# Patient Record
Sex: FEMALE | Race: WHITE | NOT HISPANIC OR LATINO | Employment: FULL TIME | URBAN - METROPOLITAN AREA
[De-identification: names, ages, dates, MRNs, and addresses within clinical notes are randomized per-mention and may not be internally consistent; named-entity substitution may affect disease eponyms.]

---

## 2017-05-05 ENCOUNTER — TRANSCRIBE ORDERS (OUTPATIENT)
Dept: ADMINISTRATIVE | Facility: HOSPITAL | Age: 62
End: 2017-05-05

## 2017-05-05 DIAGNOSIS — R94.5 NONSPECIFIC ABNORMAL RESULTS OF LIVER FUNCTION STUDY: Primary | ICD-10-CM

## 2017-05-16 ENCOUNTER — HOSPITAL ENCOUNTER (OUTPATIENT)
Dept: ULTRASOUND IMAGING | Facility: HOSPITAL | Age: 62
Discharge: HOME | End: 2017-05-16
Attending: INTERNAL MEDICINE
Payer: COMMERCIAL

## 2017-05-16 DIAGNOSIS — R94.5 NONSPECIFIC ABNORMAL RESULTS OF LIVER FUNCTION STUDY: ICD-10-CM

## 2017-05-16 PROCEDURE — 76700 US EXAM ABDOM COMPLETE: CPT

## 2017-05-23 ENCOUNTER — TRANSCRIBE ORDERS (OUTPATIENT)
Dept: ADMINISTRATIVE | Facility: HOSPITAL | Age: 62
End: 2017-05-23

## 2017-05-23 DIAGNOSIS — R94.5 NONSPECIFIC ABNORMAL RESULTS OF LIVER FUNCTION STUDY: Primary | ICD-10-CM

## 2017-05-23 DIAGNOSIS — R94.7 NONSPECIFIC ABNORMAL RESULTS OF OTHER ENDOCRINE FUNCTION STUDY: ICD-10-CM

## 2017-06-16 ENCOUNTER — HOSPITAL ENCOUNTER (OUTPATIENT)
Dept: CT IMAGING | Facility: HOSPITAL | Age: 62
Discharge: HOME | End: 2017-06-16
Attending: INTERNAL MEDICINE
Payer: COMMERCIAL

## 2017-06-16 DIAGNOSIS — R94.5 NONSPECIFIC ABNORMAL RESULTS OF LIVER FUNCTION STUDY: ICD-10-CM

## 2017-06-16 DIAGNOSIS — R94.7 NONSPECIFIC ABNORMAL RESULTS OF OTHER ENDOCRINE FUNCTION STUDY: ICD-10-CM

## 2017-06-16 PROCEDURE — 74160 CT ABDOMEN W/CONTRAST: CPT

## 2017-06-16 RX ADMIN — IOHEXOL 100 ML: 350 INJECTION, SOLUTION INTRAVENOUS at 08:46

## 2018-01-29 ENCOUNTER — TRANSCRIBE ORDERS (OUTPATIENT)
Dept: ADMINISTRATIVE | Facility: HOSPITAL | Age: 63
End: 2018-01-29

## 2018-01-29 ENCOUNTER — HOSPITAL ENCOUNTER (OUTPATIENT)
Dept: NON INVASIVE DIAGNOSTICS | Facility: HOSPITAL | Age: 63
Discharge: HOME | End: 2018-01-29
Attending: FAMILY MEDICINE
Payer: COMMERCIAL

## 2018-01-29 DIAGNOSIS — M79.661 BILATERAL CALF PAIN: ICD-10-CM

## 2018-01-29 DIAGNOSIS — M79.662 BILATERAL CALF PAIN: ICD-10-CM

## 2018-01-29 DIAGNOSIS — M79.661 BILATERAL CALF PAIN: Primary | ICD-10-CM

## 2018-01-29 DIAGNOSIS — M79.662 BILATERAL CALF PAIN: Primary | ICD-10-CM

## 2018-01-29 PROCEDURE — 93971 EXTREMITY STUDY: CPT

## 2018-04-17 ENCOUNTER — TRANSCRIBE ORDERS (OUTPATIENT)
Dept: ADMINISTRATIVE | Facility: HOSPITAL | Age: 63
End: 2018-04-17

## 2018-04-17 ENCOUNTER — APPOINTMENT (OUTPATIENT)
Dept: RADIOLOGY | Facility: CLINIC | Age: 63
End: 2018-04-17
Payer: COMMERCIAL

## 2018-04-17 DIAGNOSIS — M79.674 PAIN IN TOE OF RIGHT FOOT: ICD-10-CM

## 2018-04-17 DIAGNOSIS — M79.674 PAIN IN TOE OF RIGHT FOOT: Primary | ICD-10-CM

## 2018-04-17 PROCEDURE — 73630 X-RAY EXAM OF FOOT: CPT

## 2018-10-09 ENCOUNTER — TRANSCRIBE ORDERS (OUTPATIENT)
Dept: ADMINISTRATIVE | Facility: HOSPITAL | Age: 63
End: 2018-10-09

## 2018-10-09 DIAGNOSIS — Z12.39 SCREENING BREAST EXAMINATION: Primary | ICD-10-CM

## 2018-10-09 DIAGNOSIS — Z78.0 MENOPAUSE: ICD-10-CM

## 2018-10-12 ENCOUNTER — TELEPHONE (OUTPATIENT)
Dept: ENDOCRINOLOGY | Facility: HOSPITAL | Age: 63
End: 2018-10-12

## 2018-10-12 NOTE — TELEPHONE ENCOUNTER
We received results from labs done 10/10/18 ordered by Dr Herminia Wood [facility]  Called patient to make f/u appt  Pt advised that her PCP Dr Ava Perez will be managing thyroid  Pt requested we fax results to Dr Ava Perez   Faxed

## 2018-10-25 ENCOUNTER — HOSPITAL ENCOUNTER (OUTPATIENT)
Dept: MAMMOGRAPHY | Facility: CLINIC | Age: 63
Discharge: HOME | End: 2018-10-25
Payer: COMMERCIAL

## 2018-10-25 DIAGNOSIS — Z12.39 SCREENING BREAST EXAMINATION: ICD-10-CM

## 2018-10-25 DIAGNOSIS — Z78.0 MENOPAUSE: ICD-10-CM

## 2018-10-25 PROCEDURE — 77080 DXA BONE DENSITY AXIAL: CPT

## 2018-10-25 PROCEDURE — 77067 SCR MAMMO BI INCL CAD: CPT

## 2021-04-06 DIAGNOSIS — Z23 ENCOUNTER FOR IMMUNIZATION: ICD-10-CM

## 2021-07-01 ENCOUNTER — HOSPITAL ENCOUNTER (OUTPATIENT)
Dept: BONE DENSITY | Facility: CLINIC | Age: 66
Discharge: HOME | End: 2021-07-01
Payer: COMMERCIAL

## 2021-07-01 ENCOUNTER — HOSPITAL ENCOUNTER (OUTPATIENT)
Dept: MAMMOGRAPHY | Facility: CLINIC | Age: 66
Discharge: HOME | End: 2021-07-01
Payer: COMMERCIAL

## 2021-07-01 VITALS — BODY MASS INDEX: 29.99 KG/M2 | WEIGHT: 180 LBS | HEIGHT: 65 IN

## 2021-07-01 DIAGNOSIS — Z78.0 ASYMPTOMATIC MENOPAUSAL STATE: ICD-10-CM

## 2021-07-01 DIAGNOSIS — Z12.31 ENCOUNTER FOR SCREENING MAMMOGRAM FOR MALIGNANT NEOPLASM OF BREAST: ICD-10-CM

## 2021-07-01 PROCEDURE — 77067 SCR MAMMO BI INCL CAD: CPT

## 2021-07-01 PROCEDURE — 77080 DXA BONE DENSITY AXIAL: CPT

## 2021-07-01 PROCEDURE — 77063 BREAST TOMOSYNTHESIS BI: CPT

## 2021-11-30 ENCOUNTER — IMMUNIZATIONS (OUTPATIENT)
Dept: FAMILY MEDICINE CLINIC | Facility: HOSPITAL | Age: 66
End: 2021-11-30

## 2021-11-30 DIAGNOSIS — Z23 ENCOUNTER FOR IMMUNIZATION: Primary | ICD-10-CM

## 2021-11-30 PROCEDURE — 0001A COVID-19 PFIZER VACC 0.3 ML: CPT

## 2021-11-30 PROCEDURE — 91300 COVID-19 PFIZER VACC 0.3 ML: CPT

## 2022-01-06 ENCOUNTER — OFFICE VISIT (OUTPATIENT)
Dept: GASTROENTEROLOGY | Facility: CLINIC | Age: 67
End: 2022-01-06
Payer: COMMERCIAL

## 2022-01-06 VITALS
WEIGHT: 191 LBS | BODY MASS INDEX: 31.82 KG/M2 | DIASTOLIC BLOOD PRESSURE: 90 MMHG | SYSTOLIC BLOOD PRESSURE: 144 MMHG | HEIGHT: 65 IN

## 2022-01-06 DIAGNOSIS — M35.0C SJOGREN SYNDROME WITH DENTAL INVOLVEMENT (HCC): ICD-10-CM

## 2022-01-06 DIAGNOSIS — R74.8 ELEVATED LIVER ENZYMES: Primary | ICD-10-CM

## 2022-01-06 DIAGNOSIS — Z86.010 HISTORY OF COLON POLYPS: Primary | ICD-10-CM

## 2022-01-06 DIAGNOSIS — Z86.010 HISTORY OF COLON POLYPS: ICD-10-CM

## 2022-01-06 PROCEDURE — 99204 OFFICE O/P NEW MOD 45 MIN: CPT | Performed by: INTERNAL MEDICINE

## 2022-01-06 RX ORDER — MULTIVITAMIN
1 TABLET ORAL DAILY
COMMUNITY

## 2022-01-06 RX ORDER — CITALOPRAM 20 MG/1
TABLET ORAL
COMMUNITY
Start: 2021-11-16

## 2022-01-06 RX ORDER — SODIUM PICOSULFATE, MAGNESIUM OXIDE, AND ANHYDROUS CITRIC ACID 10; 3.5; 12 MG/160ML; G/160ML; G/160ML
LIQUID ORAL
Qty: 320 ML | Refills: 0 | Status: SHIPPED | OUTPATIENT
Start: 2022-01-06

## 2022-01-06 RX ORDER — LEVOTHYROXINE SODIUM 112 MCG
TABLET ORAL
COMMUNITY
Start: 2022-01-03

## 2022-01-06 RX ORDER — SODIUM FLUORIDE 6.1 MG/ML
GEL, DENTIFRICE DENTAL
COMMUNITY
Start: 2021-12-28

## 2022-01-06 NOTE — PROGRESS NOTES
[id number] - Outpatient Consultation  Elier Song 61 y o  female MRN: [id number]  Encounter: [id number]    ASSESSMENT AND PLAN:      1  Elevated liver enzymes  Patient is a very pleasant 53-year-old female with a history of Sjogren syndrome as well as Raynaud's phenomenon  She was evaluated by us 5 years ago for abnormal transaminases workup at that time included a liver biopsy which showed only mild hepatitis with fat  Despite a positive LYNSEY at the time she was not felt to have autoimmune hepatitis or primary biliary cirrhosis  She has noted again to have abnormal liver enzymes  Alk-phos is 243 AST 57 ALT of 87  Anti mitochondrial antibody is high at 124 actin smooth muscle antibody elevated slightly at 22 GGTP is up at 174  LYNSEY is positive at 1 to 640  The main question is whether not she has autoimmune hepatitis, primary biliary cirrhosis or an overlap syndrome with both hepatocellular and cholestatic autoimmune features or whether this is simply mild hepatitis based on fat  Her former biopsy would suggest the latter  In order to clarify things I would like to check an ultrasound as well as a fiber score  If her fibrosis and necroinflammatory activity are low it might be reasonable just to observe her  If they are high she may need another biopsy  - US right upper quadrant; Future  - Anti-smooth muscle antibody, IgG; Future  - HCV FIBROSURE; Future    2  History of colon polyps  Due for colonoscopy it has been 5 years will schedule    3   Sjogren syndrome with dental involvement  As above followed for this by Rheumatology Dr Juan Love      Followup Appointment:  3 months  ______________________________________________________________________    Chief Complaint   Patient presents with    Elevated Hepatic Enzymes       HPI:   Elier Song is a very pleasant 53-year-old female who was noted to have abnormal liver enzymes and was assessed by us in 2017   At that time her LYNSEY was positive CT and ultrasound showed fatty liver  She underwent a biopsy of the liver which did not show anything to suggest autoimmune hepatitis or sclerosing cholangitis or primary biliary cirrhosis  She was thought to have underlying autoimmune diseases and saw Dr Juan Love from Rheumatology and was diagnosed with Sjogren's syndrome  In questioning she also has some other symptoms such as Raynaud's phenomenon  She does not drink alcohol was noted again to have abnormal liver enzymes and is referred back to us      Historical Information   Past Medical History:   Diagnosis Date    Hypothyroid     Sjogren's disease ([address] )      Past Surgical History:   Procedure Laterality Date    CARPAL TUNNEL RELEASE       SECTION      COLONOSCOPY      HYSTERECTOMY      OOPHORECTOMY      TUBAL LIGATION       Social History     Substance and Sexual Activity   Alcohol Use Yes    Comment: rarely     Social History     Substance and Sexual Activity   Drug Use Not on file     Social History     Tobacco Use   Smoking Status Former Smoker   Smokeless Tobacco Never Used     Family History   Problem Relation Age of Onset    Breast cancer Mother         late [de-identified] onset   Ariadna Mare Prostate cancer Father         diagnosed in his [de-identified]    No Known Problems Daughter     No Known Problems Maternal Grandmother     No Known Problems Maternal Grandfather     No Known Problems Paternal Grandmother     No Known Problems Paternal Grandfather     Colon polyps Neg Hx     Colon cancer Neg Hx        Meds/Allergies     Current Outpatient Medications:     CALCIUM-VITAMIN D PO    citalopram (CeleXA) 20 mg tablet    Multiple Vitamin (multivitamin) tablet    NON FORMULARY    Omega-3 Fatty Acids (OMEGA-3 FISH OIL PO)    PreviDent 5000 Dry Mouth 1 1 % GEL    Synthroid 112 MCG tablet    TURMERIC PO    Sod Picosulfate-Mag Ox-Cit Acd (Clenpiq) 10-3 5-12 MG-GM -GM/160ML SOLN    Allergies   Allergen Reactions    Other Tongue Swelling     Eggplant - dysphagia    Shellfish-Derived Products - Food Allergy Swelling       PHYSICAL EXAM:    Blood pressure 144/90, height 5' 5" (1 651 m), weight 86 6 kg (191 lb)  Body mass index is 31 78 kg/m²  General Appearance: NAD, cooperative, alert  Eyes: Anicteric, PERRLA, EOMI  ENT:  Normocephalic, atraumatic, normal mucosa  Neck:  Supple, symmetrical, trachea midline,   Resp:  Clear to auscultation bilaterally; no rales, rhonchi or wheezing; respirations unlabored   CV:  S1 S2, Regular rate and rhythm; no murmur, rub, or gallop  GI:  Soft, non-tender, non-distended; normal bowel sounds; no masses, no organomegaly   Rectal: Deferred  Musculoskeletal: No cyanosis, clubbing or edema  Normal ROM  Skin:  No jaundice, rashes, or lesions   Heme/Lymph: No palpable cervical lymphadenopathy  Psych: Normal affect, good eye contact  Neuro: No gross deficits, AAOx3    Lab Results:   Lab Results   Component Value Date    WBC 6 13 03/04/2014    HGB 12 1 03/04/2014    HCT 36 5 03/04/2014    MCV 91 03/04/2014     03/04/2014     Lab Results   Component Value Date     02/12/2014    K 4 1 02/12/2014    CL 99 (L) 02/12/2014    CO2 30 02/12/2014    ANIONGAP 9 02/12/2014    BUN 19 02/12/2014    CREATININE 0 68 02/12/2014    GLUCOSE 90 02/12/2014    CALCIUM 8 5 02/12/2014    AST 42 (H) 02/12/2014    ALT 73 02/12/2014    ALKPHOS 160 (H) 02/12/2014    PROT 7 0 02/12/2014    BILITOT 0 4 02/12/2014     Lab Results   Component Value Date    IRON 45 (L) 03/04/2014    TIBC 275 03/04/2014    FERRITIN 332 4 (H) 03/04/2014     No results found for: LIPASE    Radiology Results:   No results found  REVIEW OF SYSTEMS:    CONSTITUTIONAL: Denies any fever, chills, rigors, and weight loss  HEENT: No earache or tinnitus  Denies hearing loss or visual disturbances  CARDIOVASCULAR: No chest pain or palpitations     RESPIRATORY: Denies any cough, hemoptysis, shortness of breath or dyspnea on exertion  GASTROINTESTINAL: As noted in the History of Present Illness  GENITOURINARY: No problems with urination  Denies any hematuria or dysuria  NEUROLOGIC: No dizziness or vertigo, denies headaches  MUSCULOSKELETAL: Denies any muscle or joint pain  SKIN: Denies skin rashes or itching  ENDOCRINE: Denies excessive thirst  Denies intolerance to heat or cold  PSYCHOSOCIAL: Denies depression or anxiety  Denies any recent memory loss

## 2022-01-06 NOTE — LETTER
January 6, 2022     Antelmo Beauchamp MD  [facility]  [id number] [place] [id number]    Patient: Enedina Leslie   YOB: 1955   Date of Visit: 1/6/2022       Dear Dr Anabella De La O: Thank you for referring Jarrett Getting to me for evaluation  Below are my notes for this consultation  If you have questions, please do not hesitate to call me  I look forward to following your patient along with you  Sincerely,        Lizz Mcgarry MD        CC: MD Lizz Baig MD  1/6/2022  5:36 PM  Sign when Signing Visit    [address] [facility] - Outpatient Consultation  Enedina Leslie 61 y o  female MRN: [id number]  Encounter: [id number]    ASSESSMENT AND PLAN:      1  Elevated liver enzymes  Patient is a very pleasant 68-year-old female with a history of Sjogren syndrome as well as Raynaud's phenomenon  She was evaluated by us 5 years ago for abnormal transaminases workup at that time included a liver biopsy which showed only mild hepatitis with fat  Despite a positive LYNSEY at the time she was not felt to have autoimmune hepatitis or primary biliary cirrhosis  She has noted again to have abnormal liver enzymes  Alk-phos is 243 AST 57 ALT of 87  Anti mitochondrial antibody is high at 124 actin smooth muscle antibody elevated slightly at 22 GGTP is up at 174  LYNSEY is positive at 1 to 640  The main question is whether not she has autoimmune hepatitis, primary biliary cirrhosis or an overlap syndrome with both hepatocellular and cholestatic autoimmune features or whether this is simply mild hepatitis based on fat  Her former biopsy would suggest the latter  In order to clarify things I would like to check an ultrasound as well as a fiber score  If her fibrosis and necroinflammatory activity are low it might be reasonable just to observe her  If they are high she may need another biopsy  - US right upper quadrant;  Future  - Anti-smooth muscle antibody, IgG; Future  - HCV FIBROSURE; Future    2  History of colon polyps  Due for colonoscopy it has been 5 years will schedule    3  Sjogren syndrome with dental involvement  As above followed for this by Rheumatology Dr Abdias Quick      Followup Appointment:  3 months  ______________________________________________________________________    Chief Complaint   Patient presents with    Elevated Hepatic Enzymes       HPI:   Henrik Anderson is a very pleasant 72-year-old female who was noted to have abnormal liver enzymes and was assessed by us in 2017  At that time her LYNSEY was positive CT and ultrasound showed fatty liver  She underwent a biopsy of the liver which did not show anything to suggest autoimmune hepatitis or sclerosing cholangitis or primary biliary cirrhosis  She was thought to have underlying autoimmune diseases and saw Dr Abdias Quick from Rheumatology and was diagnosed with Sjogren's syndrome  In questioning she also has some other symptoms such as Raynaud's phenomenon  She does not drink alcohol was noted again to have abnormal liver enzymes and is referred back to us      Historical Information   Past Medical History:   Diagnosis Date    Hypothyroid     Sjogren's disease ([address] )      Past Surgical History:   Procedure Laterality Date    CARPAL TUNNEL RELEASE       SECTION      COLONOSCOPY      HYSTERECTOMY      OOPHORECTOMY      TUBAL LIGATION       Social History     Substance and Sexual Activity   Alcohol Use Yes    Comment: rarely     Social History     Substance and Sexual Activity   Drug Use Not on file     Social History     Tobacco Use   Smoking Status Former Smoker   Smokeless Tobacco Never Used     Family History   Problem Relation Age of Onset    Breast cancer Mother         late [de-identified] onset   Romina Nine Prostate cancer Father         diagnosed in his [de-identified]    No Known Problems Daughter     No Known Problems Maternal Grandmother     No Known Problems Maternal Grandfather     No Known Problems Paternal Grandmother     No Known Problems Paternal Grandfather     Colon polyps Neg Hx     Colon cancer Neg Hx        Meds/Allergies     Current Outpatient Medications:     CALCIUM-VITAMIN D PO    citalopram (CeleXA) 20 mg tablet    Multiple Vitamin (multivitamin) tablet    NON FORMULARY    Omega-3 Fatty Acids (OMEGA-3 FISH OIL PO)    PreviDent 5000 Dry Mouth 1 1 % GEL    Synthroid 112 MCG tablet    TURMERIC PO    Sod Picosulfate-Mag Ox-Cit Acd (Clenpiq) 10-3 5-12 MG-GM -GM/160ML SOLN    Allergies   Allergen Reactions    Other Tongue Swelling     Eggplant - dysphagia    Shellfish-Derived Products - Food Allergy Swelling       PHYSICAL EXAM:    Blood pressure 144/90, height 5' 5" (1 651 m), weight 86 6 kg (191 lb)  Body mass index is 31 78 kg/m²  General Appearance: NAD, cooperative, alert  Eyes: Anicteric, PERRLA, EOMI  ENT:  Normocephalic, atraumatic, normal mucosa  Neck:  Supple, symmetrical, trachea midline,   Resp:  Clear to auscultation bilaterally; no rales, rhonchi or wheezing; respirations unlabored   CV:  S1 S2, Regular rate and rhythm; no murmur, rub, or gallop  GI:  Soft, non-tender, non-distended; normal bowel sounds; no masses, no organomegaly   Rectal: Deferred  Musculoskeletal: No cyanosis, clubbing or edema  Normal ROM    Skin:  No jaundice, rashes, or lesions   Heme/Lymph: No palpable cervical lymphadenopathy  Psych: Normal affect, good eye contact  Neuro: No gross deficits, AAOx3    Lab Results:   Lab Results   Component Value Date    WBC 6 13 03/04/2014    HGB 12 1 03/04/2014    HCT 36 5 03/04/2014    MCV 91 03/04/2014     03/04/2014     Lab Results   Component Value Date     02/12/2014    K 4 1 02/12/2014    CL 99 (L) 02/12/2014    CO2 30 02/12/2014    ANIONGAP 9 02/12/2014    BUN 19 02/12/2014    CREATININE 0 68 02/12/2014    GLUCOSE 90 02/12/2014    CALCIUM 8 5 02/12/2014    AST 42 (H) 02/12/2014    ALT 73 02/12/2014    ALKPHOS 160 (H) 02/12/2014 PROT 7 0 02/12/2014    BILITOT 0 4 02/12/2014     Lab Results   Component Value Date    IRON 45 (L) 03/04/2014    TIBC 275 03/04/2014    FERRITIN 332 4 (H) 03/04/2014     No results found for: LIPASE    Radiology Results:   No results found  REVIEW OF SYSTEMS:    CONSTITUTIONAL: Denies any fever, chills, rigors, and weight loss  HEENT: No earache or tinnitus  Denies hearing loss or visual disturbances  CARDIOVASCULAR: No chest pain or palpitations  RESPIRATORY: Denies any cough, hemoptysis, shortness of breath or dyspnea on exertion  GASTROINTESTINAL: As noted in the History of Present Illness  GENITOURINARY: No problems with urination  Denies any hematuria or dysuria  NEUROLOGIC: No dizziness or vertigo, denies headaches  MUSCULOSKELETAL: Denies any muscle or joint pain  SKIN: Denies skin rashes or itching  ENDOCRINE: Denies excessive thirst  Denies intolerance to heat or cold  PSYCHOSOCIAL: Denies depression or anxiety  Denies any recent memory loss

## 2022-01-06 NOTE — PATIENT INSTRUCTIONS
Scheduled date of colonoscopy (as of today):  2/24/22  Physician performing colonoscopy:  Dr Melita Alonso  Location of colonoscopy:  [facility] endoscopy [place]  Bowel prep reviewed with patient:  Clenpiq  Instructions reviewed with patient by:  Bhargavi Odom CMA  Clearances:   none

## 2022-01-17 ENCOUNTER — HOSPITAL ENCOUNTER (OUTPATIENT)
Dept: ULTRASOUND IMAGING | Facility: HOSPITAL | Age: 67
Discharge: HOME | End: 2022-01-17
Attending: INTERNAL MEDICINE
Payer: COMMERCIAL

## 2022-01-17 DIAGNOSIS — R74.8 ELEVATED LIVER ENZYMES: ICD-10-CM

## 2022-01-17 PROCEDURE — 76705 ECHO EXAM OF ABDOMEN: CPT

## 2022-02-11 ENCOUNTER — TELEPHONE (OUTPATIENT)
Dept: GASTROENTEROLOGY | Facility: CLINIC | Age: 67
End: 2022-02-11

## 2022-02-11 NOTE — TELEPHONE ENCOUNTER
Pt left  mssg stating she is supposed ot f/u in April w/ WO; no appts are avail then/transf'd to spk w/ nurse  She had CT of liver done; will do BW next wk; understands WO is cutting back but wants to see him  CB# [phone]

## 2022-02-11 NOTE — TELEPHONE ENCOUNTER
I returned call, no answer, left message that I scheduled her with WO on 5/3/22 for follow up  I asked for call back if that doesn't work for her

## 2022-02-24 ENCOUNTER — HOSPITAL ENCOUNTER (OUTPATIENT)
Dept: GASTROENTEROLOGY | Facility: AMBULATORY SURGERY CENTER | Age: 67
Discharge: HOME | End: 2022-02-24
Payer: COMMERCIAL

## 2022-02-24 ENCOUNTER — ANESTHESIA (OUTPATIENT)
Dept: GASTROENTEROLOGY | Facility: AMBULATORY SURGERY CENTER | Age: 67
End: 2022-02-24

## 2022-02-24 ENCOUNTER — ANESTHESIA EVENT (OUTPATIENT)
Dept: GASTROENTEROLOGY | Facility: AMBULATORY SURGERY CENTER | Age: 67
End: 2022-02-24

## 2022-02-24 VITALS
HEIGHT: 65 IN | OXYGEN SATURATION: 98 % | BODY MASS INDEX: 31.81 KG/M2 | DIASTOLIC BLOOD PRESSURE: 65 MMHG | RESPIRATION RATE: 17 BRPM | TEMPERATURE: 97.7 F | SYSTOLIC BLOOD PRESSURE: 111 MMHG | WEIGHT: 190.92 LBS | HEART RATE: 59 BPM

## 2022-02-24 DIAGNOSIS — Z86.010 HISTORY OF COLON POLYPS: ICD-10-CM

## 2022-02-24 PROCEDURE — G0105 COLORECTAL SCRN; HI RISK IND: HCPCS | Performed by: INTERNAL MEDICINE

## 2022-02-24 RX ORDER — PROPOFOL 10 MG/ML
INJECTION, EMULSION INTRAVENOUS AS NEEDED
Status: DISCONTINUED | OUTPATIENT
Start: 2022-02-24 | End: 2022-02-24

## 2022-02-24 RX ORDER — SODIUM CHLORIDE, SODIUM LACTATE, POTASSIUM CHLORIDE, CALCIUM CHLORIDE 600; 310; 30; 20 MG/100ML; MG/100ML; MG/100ML; MG/100ML
50 INJECTION, SOLUTION INTRAVENOUS CONTINUOUS
Status: DISCONTINUED | OUTPATIENT
Start: 2022-02-24 | End: 2022-02-28 | Stop reason: HOSPADM

## 2022-02-24 RX ADMIN — PROPOFOL 100 MG: 10 INJECTION, EMULSION INTRAVENOUS at 09:52

## 2022-02-24 RX ADMIN — PROPOFOL 50 MG: 10 INJECTION, EMULSION INTRAVENOUS at 10:03

## 2022-02-24 RX ADMIN — PROPOFOL 50 MG: 10 INJECTION, EMULSION INTRAVENOUS at 09:56

## 2022-02-24 RX ADMIN — PROPOFOL 50 MG: 10 INJECTION, EMULSION INTRAVENOUS at 10:05

## 2022-02-24 RX ADMIN — SODIUM CHLORIDE, SODIUM LACTATE, POTASSIUM CHLORIDE, CALCIUM CHLORIDE 50 ML/HR: 600; 310; 30; 20 INJECTION, SOLUTION INTRAVENOUS at 09:19

## 2022-02-24 RX ADMIN — PROPOFOL 50 MG: 10 INJECTION, EMULSION INTRAVENOUS at 09:54

## 2022-02-24 RX ADMIN — PROPOFOL 50 MG: 10 INJECTION, EMULSION INTRAVENOUS at 09:59

## 2022-02-24 NOTE — PROGRESS NOTES
Dr Ashleigh Alberto spoke with pt and reviewed results  Pt given written and verbal d/c instructions

## 2022-02-24 NOTE — DISCHARGE INSTRUCTIONS
Colonoscopy   WHAT YOU NEED TO KNOW:   A colonoscopy is a procedure to examine the inside of your colon (intestine) with a scope  Polyps or tissue growths may have been removed during your colonoscopy  It is normal to feel bloated and to have some abdominal discomfort  You should be passing gas  If you have hemorrhoids or you had polyps removed, you may have a small amount of bleeding  DISCHARGE INSTRUCTIONS:   Seek care immediately if:    You have sudden, severe abdominal pain   You have problems swallowing   You have a large amount of black, sticky bowel movements or blood in your bowel movements   You have sudden trouble breathing   You feel weak, lightheaded, or faint or your heart beats faster than normal for you  Contact your healthcare provider if:    You have a fever and chills   You have nausea or are vomiting   Your abdomen is bloated or feels full and hard   You have abdominal pain   You have black, sticky bowel movements or blood in your bowel movements   You have not had a bowel movement for 3 days after your procedure   You have rash or hives   You have questions or concerns about your procedure  Activity:    Do not lift, strain, or run for 24 hours after your procedure   Rest after your procedure  You have been given medicine to relax you  Do not drive or make important decisions until the day after your procedure  Return to your normal activity as directed   Relieve gas and discomfort from bloating by lying on your right side with a heating pad on your abdomen  You may need to take short walks to help the gas move out  Eat small meals until bloating is relieved  Follow up with your healthcare provider as directed: Write down your questions so you remember to ask them during your visits  If you take a blood thinner, please review the specific instructions from your endoscopist about when you should resume it   These can be found in the Recommendation and Your Medication list sections of this After Visit Summary  Hemorrhoids   WHAT YOU NEED TO KNOW:   What are hemorrhoids? Hemorrhoids are swollen blood vessels inside your rectum (internal hemorrhoids) or on your anus (external hemorrhoids)  Sometimes a hemorrhoid may prolapse  This means it extends out of your anus  What increases my risk for hemorrhoids? · Pregnancy or obesity    · Straining or sitting for a long time during bowel movements    · Liver disease    · Weak muscles around the anus caused by older age, rectal surgery, or anal intercourse    · A lack of physical activity    · Chronic diarrhea or constipation    · A low-fiber diet    What are the signs and symptoms of hemorrhoids? · Pain or itching around your anus or inside your rectum    · Swelling or bumps around your anus    · Bright red blood in your bowel movement, on the toilet paper, or in the toilet bowl    · Tissue bulging out of your anus (prolapsed hemorrhoids)    · Incontinence (poor control over urine or bowel movements)    How are hemorrhoids diagnosed? Your healthcare provider will ask about your symptoms, the foods you eat, and your bowel movements  He or she will examine your anus for external hemorrhoids  You may need the following:  · A digital rectal exam  is a test to check for hemorrhoids  Your healthcare provider will put a gloved finger inside your anus to feel for the hemorrhoids  · An anoscopy  is a test that uses a scope (small tube with a light and camera on the end) to look at your hemorrhoids  How are hemorrhoids treated? Treatment will depend on your symptoms  You may need any of the following:  · Medicines  can help decrease pain and swelling, and soften your bowel movement  The medicine may be a pill, pad, cream, or ointment  · Procedures  may be used to shrink or remove your hemorrhoid  Examples include rubber-band ligation, sclerotherapy, and photocoagulation  These procedures may be done in your healthcare provider's office  Ask your healthcare provider for more information about these procedures  · Surgery  may be needed to shrink or remove your hemorrhoids  How can I manage my symptoms? · Apply ice on your anus for 15 to 20 minutes every hour or as directed  Use an ice pack, or put crushed ice in a plastic bag  Cover it with a towel before you apply it to your anus  Ice helps prevent tissue damage and decreases swelling and pain  · Take a sitz bath  Fill a bathtub with 4 to 6 inches of warm water  You may also use a sitz bath pan that fits inside a toilet bowl  Sit in the sitz bath for 15 minutes  Do this 3 times a day, and after each bowel movement  The warm water can help decrease pain and swelling  · Keep your anal area clean  Gently wash the area with warm water daily  Soap may irritate the area  After a bowel movement, wipe with moist towelettes or wet toilet paper  Dry toilet paper can irritate the area  How can I help prevent hemorrhoids? · Do not strain to have a bowel movement  Do not sit on the toilet too long  These actions can increase pressure on the tissues in your rectum and anus  · Drink plenty of liquids  Liquids can help prevent constipation  Ask how much liquid to drink each day and which liquids are best for you  · Eat a variety of high-fiber foods  Examples include fruits, vegetables, and whole grains  Ask your healthcare provider how much fiber you need each day  You may need to take a fiber supplement  · Exercise as directed  Exercise, such as walking, may make it easier to have a bowel movement  Ask your healthcare provider to help you create an exercise plan  · Do not have anal sex  Anal sex can weaken the skin around your rectum and anus  · Avoid heavy lifting  This can cause straining and increase your risk for another hemorrhoid  When should I seek immediate care?    · You have severe pain in your rectum or around your anus  · You have severe pain in your abdomen and you are vomiting  · You have bleeding from your anus that soaks through your underwear  When should I contact my healthcare provider? · You have frequent and painful bowel movements  · Your hemorrhoid looks or feels more swollen than usual      · You do not have a bowel movement for 2 days or more  · You see or feel tissue coming through your anus  · You have questions or concerns about your condition or care  CARE AGREEMENT:   You have the right to help plan your care  Learn about your health condition and how it may be treated  Discuss treatment options with your healthcare providers to decide what care you want to receive  You always have the right to refuse treatment  The above information is an  only  It is not intended as medical advice for individual conditions or treatments  Talk to your doctor, nurse or pharmacist before following any medical regimen to see if it is safe and effective for you  © Copyright [employer] 2021 Information is for End User's use only and may not be sold, redistributed or otherwise used for commercial purposes   All illustrations and images included in CareNotes® are the copyrighted property of A D A M , Inc  or [address] [place] [address] [employer]

## 2022-02-24 NOTE — ANESTHESIA PREPROCEDURE EVALUATION
Procedure:  COLONOSCOPY    Relevant Problems   ANESTHESIA (within normal limits)      CARDIO (within normal limits)      ENDO   (+) Hypothyroid      PULMONARY (within normal limits)   (-) Sleep apnea   (-) Smoking   (-) URI (upper respiratory infection)      Other   (+) Sjogren's disease (HCC)    BMI 31    Physical Exam    Airway    Mallampati score: III  TM Distance: >3 FB  Neck ROM: full     Dental   No notable dental hx     Cardiovascular      Pulmonary      Other Findings        Anesthesia Plan  ASA Score- 2     Anesthesia Type- IV sedation with anesthesia with ASA Monitors  Additional Monitors:   Airway Plan:           Plan Factors-Exercise tolerance (METS): >4 METS  Chart reviewed  Existing labs reviewed  Patient summary reviewed  Patient is not a current smoker  Induction- intravenous  Postoperative Plan-     Informed Consent- Anesthetic plan and risks discussed with patient  I personally reviewed this patient with the CRNA  Discussed and agreed on the Anesthesia Plan with the CRNA  Lakhwinder Lacey

## 2022-02-24 NOTE — ANESTHESIA POSTPROCEDURE EVALUATION
Post-Op Assessment Note    CV Status:  Stable  Pain Score: 0    Pain management: adequate     Mental Status:  Sleepy   Hydration Status:  Euvolemic and stable   PONV Controlled:  None   Airway Patency:  Patent      Post Op Vitals Reviewed: Yes      Staff: CRNA         No complications documented      /52 (02/24/22 1009)    Temp      Pulse 67 (02/24/22 1009)   Resp 19 (02/24/22 1009)    SpO2 95 % (02/24/22 1009)

## 2022-02-24 NOTE — H&P
History and Physical -  [facility]  Prema Stein 61 y o  female MRN: [id number]    HPI: Livia Burgess is a 61y o  year old female who presents for colonoscopy for history of colon polyps    REVIEW OF SYSTEMS: Per the HPI, and otherwise unremarkable      Historical Information   Past Medical History:   Diagnosis Date    Disease of thyroid gland     Hypothyroid     Sjogren's disease ([address] )      Past Surgical History:   Procedure Laterality Date    CARPAL TUNNEL RELEASE       SECTION      COLONOSCOPY      HYSTERECTOMY      OOPHORECTOMY      TUBAL LIGATION       Social History   Social History     Substance and Sexual Activity   Alcohol Use Yes    Comment: rarely     Social History     Substance and Sexual Activity   Drug Use Never     Social History     Tobacco Use   Smoking Status Former Smoker   Smokeless Tobacco Never Used     Family History   Problem Relation Age of Onset    Breast cancer Mother         late [de-identified] onset   Flako Lozada Prostate cancer Father         diagnosed in his [de-identified]    No Known Problems Daughter     No Known Problems Maternal Grandmother     No Known Problems Maternal Grandfather     No Known Problems Paternal Grandmother     No Known Problems Paternal Grandfather     Colon polyps Neg Hx     Colon cancer Neg Hx        Meds/Allergies       Current Outpatient Medications:     CALCIUM-VITAMIN D PO    citalopram (CeleXA) 20 mg tablet    Multiple Vitamin (multivitamin) tablet    NON FORMULARY    Omega-3 Fatty Acids (OMEGA-3 FISH OIL PO)    PreviDent 5000 Dry Mouth 1 1 % GEL    Sod Picosulfate-Mag Ox-Cit Acd (Clenpiq) 10-3 5-12 MG-GM -GM/160ML SOLN    Synthroid 112 MCG tablet    TURMERIC PO    Current Facility-Administered Medications:     lactated ringers infusion, 50 mL/hr, Intravenous, Continuous, Continue from Pre-op at 22 0943    Allergies   Allergen Reactions    Other Tongue Swelling     Eggplant - dysphagia    Shellfish-Derived Products - Food Allergy Swelling       Objective     /65   Pulse 76 Comment: NSR  Temp 97 7 °F (36 5 °C) (Temporal)   Resp 13   Ht 5' 5" (1 651 m)   Wt 86 6 kg (190 lb 14 7 oz)   SpO2 96%   BMI 31 77 kg/m²     PHYSICAL EXAM    Gen: NAD AAOx3  Head: Normocephalic, Atraumatic  CV: S1S2 RRR no m/r/g  CHEST: Clear b/l no c/r/w  ABD: soft, +BS NT/ND  EXT: no edema    ASSESSMENT/PLAN:  This is a 61y o  year old female here for colonoscopy, and she is stable and optimized for her procedure

## 2022-02-26 LAB — ANTI-SMOOTH MUSCLE AB BY IFA: NORMAL

## 2022-04-16 ENCOUNTER — IMMUNIZATIONS (OUTPATIENT)
Dept: FAMILY MEDICINE CLINIC | Facility: HOSPITAL | Age: 67
End: 2022-04-16

## 2022-04-16 PROCEDURE — 91305 COVID-19 PFIZER VACC TRIS-SUCROSE GRAY CAP 0.3 ML: CPT

## 2022-04-16 PROCEDURE — 0054A COVID-19 PFIZER VACC TRIS-SUCROSE GRAY CAP 0.3 ML: CPT

## 2022-05-03 ENCOUNTER — TELEMEDICINE (OUTPATIENT)
Dept: GASTROENTEROLOGY | Facility: CLINIC | Age: 67
End: 2022-05-03
Payer: COMMERCIAL

## 2022-05-03 DIAGNOSIS — Z86.010 HISTORY OF COLON POLYPS: ICD-10-CM

## 2022-05-03 DIAGNOSIS — R74.8 ELEVATED LIVER ENZYMES: ICD-10-CM

## 2022-05-03 DIAGNOSIS — M35.0C SJOGREN SYNDROME WITH DENTAL INVOLVEMENT (HCC): ICD-10-CM

## 2022-05-03 DIAGNOSIS — K76.0 NAFLD (NONALCOHOLIC FATTY LIVER DISEASE): Primary | ICD-10-CM

## 2022-05-03 DIAGNOSIS — K75.81 STEATOHEPATITIS, NON-ALCOHOLIC: ICD-10-CM

## 2022-05-03 PROCEDURE — 99214 OFFICE O/P EST MOD 30 MIN: CPT | Performed by: INTERNAL MEDICINE

## 2022-05-03 RX ORDER — FEXOFENADINE HCL 180 MG/1
180 TABLET ORAL AS NEEDED
COMMUNITY

## 2022-05-03 NOTE — PROGRESS NOTES
Virtual Regular Visit    Verification of patient location:    Patient is located in the following state in which I hold an active license [place]      Assessment/Plan:    Problem List Items Addressed This Visit        Other    Elevated liver enzymes    Relevant Orders    HCV FIBROSURE      Other Visit Diagnoses     Sjogren syndrome with dental involvement ([facility] )    -  Primary    History of colon polyps            1  Sjogren syndrome with dental involvement ([facility] )  Stable    2  History of colon polyps  Negative colonoscopy just done 5 year recall    3  Elevated liver enzymes  Patient is a very pleasant 73-year-old female we follow with a history of abnormal liver enzymes thought to be possibly related to low-grade inflammation associated with fatty liver  She had a biopsy about 5 years ago which showed that without significant fibrosis  Annie Rebollar She was seen in follow-up for ongoing liver enzyme elevation  Asymptomatic  She does not really drink alcohol or have any hepato toxic exposures  Ultrasound we ordered was essentially negative we did order a fiber score but for some reason this did not get done will attempt to reorder  If it is not covered may need to consider biopsy however if the fiber score shows no fibrosis I would feel comfortable watching her  Will ask nursing to follow-up on approval or coverage    - HCV FIBROSURE; Future      Followup Appointment:  1 year      Depression Screening and Follow-up Plan: Not primary        Reason for visit is   Chief Complaint   Patient presents with    Follow-up     bloodwork/liver scan results    Virtual Regular Visit        Encounter provider Soraida Hanson MD    Provider located at [address] [facility] [id number] [phone]      Recent Visits  No visits were found meeting these conditions    Showing recent visits within past 7 days and meeting all other requirements  Today's Visits  Date Type Provider Dept   22 Telemedicine Rachel Penny MD [facility]   Showing today's visits and meeting all other requirements  Future Appointments  No visits were found meeting these conditions  Showing future appointments within next 150 days and meeting all other requirements       The patient was identified by name and date of birth  Karina Gonzalez was informed that this is a telemedicine visit and that the visit is being conducted through [facility] and patient was informed this is a secure, HIPAA-complaint platform  She agrees to proceed     My office door was closed  No one else was in the room  She acknowledged consent and understanding of privacy and security of the video platform  The patient has agreed to participate and understands they can discontinue the visit at any time  Patient is aware this is a billable service  Subjective  Karina Gonzalez is a 74 y o  female we follow with a history of abnormal liver enzymes thought to be possibly related to low-grade inflammation associated with fatty liver  She had a biopsy about 5 years ago which showed that without significant fibrosis  Unk Elly She was seen in follow-up for ongoing liver enzyme elevation  Asymptomatic  She does not really drink alcohol or have any hepato toxic exposures  Ultrasound we ordered was essentially negative we did order a fiber score but for some reason this did not get done will attempt to reorder  She feels well and is essentially asymptomatic from a GI standpoint   - HCV FIBROSURE; Future      Followup Appointment:  1 year            HPI     Past Medical History:   Diagnosis Date    Disease of thyroid gland     Hypothyroid     Sjogren's disease ([facility] [address] )        Past Surgical History:   Procedure Laterality Date    CARPAL TUNNEL RELEASE       SECTION      COLONOSCOPY      HYSTERECTOMY      OOPHORECTOMY      TUBAL LIGATION         Current Outpatient Medications Medication Sig Dispense Refill    CALCIUM-VITAMIN D PO Take by mouth      citalopram (CeleXA) 20 mg tablet       fexofenadine (ALLEGRA) 180 MG tablet Take 180 mg by mouth if needed      Multiple Vitamin (multivitamin) tablet Take 1 tablet by mouth daily      NON FORMULARY Garlacin      Omega-3 Fatty Acids (OMEGA-3 FISH OIL PO) Take by mouth      PreviDent 5000 Dry Mouth 1 1 % GEL       Synthroid 112 MCG tablet       TURMERIC PO Take by mouth      Sod Picosulfate-Mag Ox-Cit Acd (Clenpiq) 10-3 5-12 MG-GM -GM/160ML SOLN Take as directed  (Patient not taking: Reported on 5/3/2022 ) 320 mL 0     No current facility-administered medications for this visit  Allergies   Allergen Reactions    Other Tongue Swelling     Eggplant - dysphagia    Shellfish-Derived Products - Food Allergy Swelling       Review of Systems negative except as per the HPI    Video Exam    There were no vitals filed for this visit  Physical Exam by video awake alert no acute distress skin ENT and Neuro grossly intact    I spent Twenty minutes directly with the patient during this visit    [address] verbally agrees to participate in [employer]  Pt is aware that [employer] could be limited without vital signs or the ability to perform a full hands-on physical exam  Estella Stein understands she or the provider may request at any time to terminate the video visit and request the patient to seek care or treatment in person

## 2022-05-03 NOTE — LETTER
May 3, 2022     Karly Strong MD  [address] [address]    Patient: Trish Arshad   YOB: 1955   Date of Visit: 5/3/2022       Dear Dr Zuri Kelley: Thank you for referring Magnolia Beauchamp to me for evaluation  Below are my notes for this consultation  If you have questions, please do not hesitate to call me  I look forward to following your patient along with you           Sincerely,        Nico Berry MD        CC: Kerry Higginbotham MD

## 2022-05-16 ENCOUNTER — TELEPHONE (OUTPATIENT)
Dept: GASTROENTEROLOGY | Facility: CLINIC | Age: 67
End: 2022-05-16

## 2022-05-16 NOTE — TELEPHONE ENCOUNTER
Pt states about 2 wks ago she had an appt w/ WO-they discussed a test @ [facility] that checked for scar tissue in her liver-sometimes Ins fusses about covering it; would have a nurse do some investigating if Ins would cover; if not could do a bx; advised to KIRIT and danak w/ Madhuri or Gloria Wood CB# [phone]

## 2022-05-16 NOTE — TELEPHONE ENCOUNTER
Fibrosure usually covered with dx of NAFLD ([facility])  We have issue with elevated liver enzymes as diagnosis on order  Please advise, patient is aware she will hear from us on lab order by end of this week  She states she feels fine

## 2022-05-19 NOTE — TELEPHONE ENCOUNTER
I spoke with patient and reviewed that dx amended and it should not be a problem  I recommended she call her insurance company to discuss if she feels it will be an issue

## 2022-06-10 LAB
A2 MACROGLOB SERPL-MCNC: 209 MG/DL (ref 110–276)
ALT SERPL W P-5'-P-CCNC: 68 IU/L (ref 0–40)
APO A-I SERPL-MCNC: 149 MG/DL (ref 116–209)
BILIRUB SERPL-MCNC: 0.5 MG/DL (ref 0–1.2)
COMMENT: ABNORMAL
FIBROSIS SCORING:: ABNORMAL
FIBROSIS STAGE SERPL QL: ABNORMAL
GGT SERPL-CCNC: 172 IU/L (ref 0–60)
HAPTOGLOB SERPL-MCNC: 51 MG/DL (ref 37–355)
INTERPRETATIONS: ABNORMAL
LIVER FIBR SCORE SERPL CALC.FIBROSURE: 0.56 (ref 0–0.21)
NECROINFLAMM ACTIVITY SCORING:: ABNORMAL
NECROINFLAMMATORY ACT GRADE SERPL QL: ABNORMAL
NECROINFLAMMATORY ACT SCORE SERPL: 0.5 (ref 0–0.17)
SERVICE CMNT-IMP: ABNORMAL

## 2022-06-23 ENCOUNTER — TELEPHONE (OUTPATIENT)
Dept: GASTROENTEROLOGY | Facility: CLINIC | Age: 67
End: 2022-06-23

## 2022-06-23 DIAGNOSIS — M35.0C SJOGREN SYNDROME WITH DENTAL INVOLVEMENT (HCC): ICD-10-CM

## 2022-06-23 DIAGNOSIS — K76.0 NAFLD (NONALCOHOLIC FATTY LIVER DISEASE): Primary | ICD-10-CM

## 2022-06-23 DIAGNOSIS — R74.8 ELEVATED LIVER ENZYMES: ICD-10-CM

## 2022-06-23 DIAGNOSIS — K75.81 STEATOHEPATITIS, NON-ALCOHOLIC: ICD-10-CM

## 2022-06-23 NOTE — TELEPHONE ENCOUNTER
Pt states WO ordered HCV fibrosure test  She sees results in the portal/would like to review  CB# [phone]

## 2022-06-24 NOTE — TELEPHONE ENCOUNTER
Spoke with patient and reviewed fiber score  Necroinflammatory score is slightly elevated a fibrosis score is F2  Somewhat surprising with respect to the fibrosis which she had no evidence of on previous biopsy  Working diagnosis is steatohepatitis  Obviously the thing that we need to exclude definitively is autoimmune hepatitis  Please order CMP, LYNSEY and anti smooth muscle antibody to be done in about 3 months  Patient says she uses [facility]  Routine office visit after that or she and I can speak by phone  She could do a tele visit if she prefers that    Thanks

## 2022-09-23 LAB
ACTIN IGG SERPL-ACNC: 16 UNITS (ref 0–19)
ALBUMIN SERPL-MCNC: 4.2 G/DL (ref 3.8–4.8)
ALBUMIN/GLOB SERPL: 1.7 {RATIO} (ref 1.2–2.2)
ALP SERPL-CCNC: 289 IU/L (ref 44–121)
ALT SERPL-CCNC: 85 IU/L (ref 0–32)
ANA SER QL: POSITIVE
AST SERPL-CCNC: 57 IU/L (ref 0–40)
BILIRUB SERPL-MCNC: 0.5 MG/DL (ref 0–1.2)
BUN SERPL-MCNC: 22 MG/DL (ref 8–27)
BUN/CREAT SERPL: 26 (ref 12–28)
CALCIUM SERPL-MCNC: 8.9 MG/DL (ref 8.7–10.3)
CHLORIDE SERPL-SCNC: 105 MMOL/L (ref 96–106)
CO2 SERPL-SCNC: 24 MMOL/L (ref 20–29)
CREAT SERPL-MCNC: 0.84 MG/DL (ref 0.57–1)
DSDNA AB SER-ACNC: 1 IU/ML (ref 0–9)
EGFR: 77 ML/MIN/1.73
ENA RNP AB SER-ACNC: 0.5 AI (ref 0–0.9)
ENA SM AB SER-ACNC: <0.2 AI (ref 0–0.9)
ENA SS-A AB SER-ACNC: 3.2 AI (ref 0–0.9)
ENA SS-B AB SER-ACNC: <0.2 AI (ref 0–0.9)
GLOBULIN SER-MCNC: 2.5 G/DL (ref 1.5–4.5)
GLUCOSE SERPL-MCNC: 90 MG/DL (ref 65–99)
POTASSIUM SERPL-SCNC: 4.3 MMOL/L (ref 3.5–5.2)
PROT SERPL-MCNC: 6.7 G/DL (ref 6–8.5)
SL AMB SEE BELOW:: ABNORMAL
SODIUM SERPL-SCNC: 140 MMOL/L (ref 134–144)

## 2022-09-26 ENCOUNTER — TELEPHONE (OUTPATIENT)
Dept: OBGYN CLINIC | Facility: HOSPITAL | Age: 67
End: 2022-09-26

## 2022-09-26 NOTE — TELEPHONE ENCOUNTER
Dr Thomas Guevara    NP bilat hands no tests sx 10+ years ago [facility]     Patient lives in [facility] # [phone]
77

## 2022-10-06 ENCOUNTER — OFFICE VISIT (OUTPATIENT)
Dept: OBGYN CLINIC | Facility: CLINIC | Age: 67
End: 2022-10-06
Payer: COMMERCIAL

## 2022-10-06 ENCOUNTER — APPOINTMENT (OUTPATIENT)
Dept: RADIOLOGY | Facility: CLINIC | Age: 67
End: 2022-10-06
Payer: COMMERCIAL

## 2022-10-06 ENCOUNTER — TELEMEDICINE (OUTPATIENT)
Dept: GASTROENTEROLOGY | Facility: CLINIC | Age: 67
End: 2022-10-06
Payer: COMMERCIAL

## 2022-10-06 VITALS
HEART RATE: 85 BPM | HEIGHT: 65 IN | DIASTOLIC BLOOD PRESSURE: 87 MMHG | WEIGHT: 190 LBS | SYSTOLIC BLOOD PRESSURE: 125 MMHG | BODY MASS INDEX: 31.65 KG/M2

## 2022-10-06 VITALS — HEIGHT: 65 IN | WEIGHT: 190 LBS | BODY MASS INDEX: 31.65 KG/M2

## 2022-10-06 DIAGNOSIS — M19.131 SCAPHOLUNATE ADVANCED COLLAPSE OF RIGHT WRIST: ICD-10-CM

## 2022-10-06 DIAGNOSIS — M79.645 PAIN OF LEFT THUMB: ICD-10-CM

## 2022-10-06 DIAGNOSIS — M19.032 LOCALIZED PRIMARY OSTEOARTHRITIS OF CARPOMETACARPAL (CMC) JOINT OF LEFT WRIST: ICD-10-CM

## 2022-10-06 DIAGNOSIS — R74.8 ELEVATED LIVER ENZYMES: Primary | ICD-10-CM

## 2022-10-06 DIAGNOSIS — K76.0 NAFLD (NONALCOHOLIC FATTY LIVER DISEASE): ICD-10-CM

## 2022-10-06 DIAGNOSIS — M35.0C SJOGREN SYNDROME WITH DENTAL INVOLVEMENT (HCC): ICD-10-CM

## 2022-10-06 DIAGNOSIS — M79.644 THUMB PAIN, RIGHT: ICD-10-CM

## 2022-10-06 DIAGNOSIS — Z86.010 HISTORY OF COLON POLYPS: ICD-10-CM

## 2022-10-06 DIAGNOSIS — M79.644 THUMB PAIN, RIGHT: Primary | ICD-10-CM

## 2022-10-06 PROCEDURE — 99214 OFFICE O/P EST MOD 30 MIN: CPT | Performed by: INTERNAL MEDICINE

## 2022-10-06 PROCEDURE — 73130 X-RAY EXAM OF HAND: CPT

## 2022-10-06 PROCEDURE — 99203 OFFICE O/P NEW LOW 30 MIN: CPT | Performed by: PHYSICIAN ASSISTANT

## 2022-10-06 NOTE — LETTER
October 6, 2022     Lourdes Curry MD  [address] [address]    Patient: Livia Burgess   YOB: 1955   Date of Visit: 10/6/2022       Dear Dr Author Ortega: Thank you for referring Madhuri Pérez to me for evaluation  Below are my notes for this consultation  If you have questions, please do not hesitate to call me  I look forward to following your patient along with you           Sincerely,        Ernie Barnett MD        CC: Manish Morfin MD

## 2022-10-06 NOTE — PROGRESS NOTES
Orthopaedic Surgery - Office Note  Hoda Lassiter (46 y o  female)   : 1955   MRN: [id number]  Encounter Date: 10/6/2022    Chief Complaint   Patient presents with    Left Hand - Pain    Right Hand - Pain         Assessment/Plan  Diagnoses and all orders for this visit:    Thumb pain, right  -     XR hand 3+ vw right; Future  -     MRI wrist right wo contrast; Future  -     Durable Medical Equipment    Pain of left thumb  -     XR hand 3+ vw left; Future  -     Durable Medical Equipment    Primary osteoarthritis of first carpometacarpal joint of right hand    Localized primary osteoarthritis of carpometacarpal (CMC) joint of left wrist  -     Durable Medical Equipment    Scapholunate advanced collapse of right wrist  -     MRI wrist right wo contrast; Future  -     Durable Medical Equipment    Regarding the patient's right hand symptoms, the x-rays show advanced scapholunate collapse and I would recommend an MRI to evaluate the degree of degenerative changes and rule out avascular necrosis of the scaphoid  She will return with the hand surgeon to discuss best available treatment options  In the interim she will be started in occupational therapy and given a thumb spica splint to be worn at night  Regarding the patient's left hand symptoms it was reviewed she has a more common CMC joint osteoarthritis  Conservative treatments would consist of the thumb spica brace to be worn at night in combination with occupational therapy  If the symptoms become more painful and begin to affect her activities of daily living we could consider a CMC joint cortisone injection but would not be recommended at this time  Return for Recheck with Dr Ike Webber to discuss the MRI  History of Present Illness  This is a new patient with right and left hand pain at the base of bilateral thumbs  She has not had any recent treatment  Patient reports the right thumb hurts more than the left thumb    She is right-hand dominant  She has a history of a right carpal tunnel release 10 years ago  She reports the pain is at the base of the right thumb and is made worse with gripping activities  She reports that she has been very physical with her hands over the years and has had multiple traumas but no known fractures  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  She reports to a lesser degree she has left thumb pain that is not affecting her activities of daily living at this time  She reports that driving does increase her right and left hand pain at the base of the thumbs  She has tried oral medications and CBD lotions without relief  She reports her daughter is currently pregnant with twins and has preeclampsia  She has been helping out a lot around the house and is trying to schedule all of her appointments around helping out her daughter  Review of Systems  Pertinent items are noted in HPI  All other systems were reviewed and are negative  Physical Exam  /87   Pulse 85   Ht 5' 5" (1 651 m)   Wt 86 2 kg (190 lb)   BMI 31 62 kg/m²   Cons: Appears well  No apparent distress  Psych: Alert  Oriented x3  Mood and affect normal   Eyes: PERRLA, EOMI  Resp: Normal effort  No audible wheezing or stridor  CV: Palpable pulse  No discernable arrhythmia  Lymph:  No palpable cervical, axillary, or inguinal lymphadenopathy  Skin: Warm  No palpable masses  No visible lesions  Neuro: Normal muscle tone  Normal and symmetric DTR's  Patient's right hand has no skin breakdown lesions or signs of infection  Surgical incisions are well healed  She has tenderness to palpation at the anatomical snuffbox and to a lesser degree the [facility] joint  She has minimal pain with CMC grind   strength and pinch strength are decreased to 4/5 with pain localized to the base of the thumb  She has full range of motion of the right wrist to extension flexion ulnar and radial deviation    She has a negative Tinel's at the carpal tunnel  Capillary refill is normal   She has a 2+ pulse at the distal radial ulnar pulses  Patient's left hand has no skin breakdown lesions or signs of infection  She has no thenar atrophy  She has tenderness to palpation at the [facility] joint with a positive CMC grind test   She has no anatomical snuffbox tenderness  She has a negative Finkelstein's test   She has full range of motion of all MCP and IP joints  Her wrist range of motion is within normal limits  Her  strength and pinch strength is 5/5  Studies Reviewed  X-rays performed in the office today three views of the right hand do show advanced degenerative changes of the scapholunate joint with cystic changes in the distal pole of the scaphoid  No acute fractures or dislocations are seen  This was read from orthopedic standpoint will await official radiologist interpretation    X-rays performed in the office today three views of the left hand do show advanced CMC joint osteoarthritis without acute fracture or dislocation  This was read from an orthopedic standpoint will await official radiologist interpretation    Procedures  No procedures today  Medical, Surgical, Family, and Social History  The patient's medical history, family history, and social history, were reviewed and updated as appropriate      Past Medical History:   Diagnosis Date    Disease of thyroid gland     Hypothyroid     S[facility]ogren's disease ([facility] )        Past Surgical History:   Procedure Laterality Date    CARPAL TUNNEL RELEASE       SECTION      COLONOSCOPY      HYSTERECTOMY      OOPHORECTOMY      TUBAL LIGATION         Family History   Problem Relation Age of Onset    Breast cancer Mother         late [de-identified] onset   Sukhdeep Abt Prostate cancer Father         diagnosed in his [de-identified]   Sukhdeep Abt No Known Problems Daughter     No Known Problems Maternal Grandmother     No Known Problems Maternal Grandfather     No Known Problems Paternal Grandmother     No Known Problems Paternal Grandfather     Colon polyps Neg Hx     Colon cancer Neg Hx        Social History     Occupational History    Not on file   Tobacco Use    Smoking status: Former Smoker    Smokeless tobacco: Never Used   Vaping Use    Vaping Use: Never used   Substance and Sexual Activity    Alcohol use: Yes     Comment: rarely/social    Drug use: Never    Sexual activity: Not on file       Allergies   Allergen Reactions    Other Tongue Swelling     Eggplant - dysphagia    Shellfish-Derived Products - Food Allergy Swelling         Current Outpatient Medications:     CALCIUM-VITAMIN D PO, Take by mouth, Disp: , Rfl:     citalopram (CeleXA) 20 mg tablet, , Disp: , Rfl:     fexofenadine (ALLEGRA) 180 MG tablet, Take 180 mg by mouth if needed, Disp: , Rfl:     Multiple Vitamin (multivitamin) tablet, Take 1 tablet by mouth daily, Disp: , Rfl:     NON FORMULARY, Garlacin, Disp: , Rfl:     Omega-3 Fatty Acids (OMEGA-3 FISH OIL PO), Take by mouth, Disp: , Rfl:     PreviDent 5000 Dry Mouth 1 1 % GEL, , Disp: , Rfl:     Synthroid 112 MCG tablet, , Disp: , Rfl:     TURMERIC PO, Take by mouth, Disp: , Rfl:     Sod Picosulfate-Mag Ox-Cit Acd (Clenpiq) 10-3 5-12 MG-GM -GM/160ML SOLN, Take as directed   (Patient not taking: No sig reported), Disp: 320 mL, Rfl: 0      Rolo Partida PA-C

## 2022-10-06 NOTE — PROGRESS NOTES
Virtual Regular Visit    Verification of patient location:    Patient is located in the following state in which I hold an active license [place]      Assessment/Plan:HPI: Patient is a very pleasant 46-year-old female with a history of Sjogren syndrome as well as Raynaud's phenomenon  She was evaluated by us 5 years ago for abnormal transaminases workup at that time included a liver biopsy which showed only mild hepatitis with fat  Despite a positive LYNSEY at the time she was not felt to have autoimmune hepatitis or primary biliary cirrhosis  She has noted again to have abnormal liver enzymes  Alk-phos is 243 AST 57 ALT of 87  Anti mitochondrial antibody is high at 124 actin smooth muscle antibody elevated slightly at 22 GGTP is up at 174  LYNSEY is positive at 1 to 640  The main question is whether not she has autoimmune hepatitis, primary biliary cirrhosis or an overlap syndrome with both hepatocellular and cholestatic autoimmune features or whether this is simply mild hepatitis based on fat  Her former biopsy would suggest the latter  The ultrasound is nondiagnostic a fiber score shows F2 with some increase in necroinflammatory activity  Bottom line is I think we should consider another biopsy but before proceeding down this path I would like to have an opinion with Dr Pavan Parry in [place]  The question is does she need a biopsy also is this autoimmune related liver disease or just garden-variety steatohepatitis      -   history of polyps recent negative colonoscopy recall in about 4 years      Problem List Items Addressed This Visit        Other    Elevated liver enzymes - Primary      Other Visit Diagnoses     Sjogren syndrome with dental involvement ([facility] [address] )        NAFLD (nonalcoholic fatty liver disease)        History of colon polyps                   Reason for visit is   Chief Complaint   Patient presents with    Follow up to labs    Virtual Regular Visit        Encounter provider Margarita Barrera, MD    Provider located at [place] [address]  [phone]      Recent Visits  No visits were found meeting these conditions  Showing recent visits within past 7 days and meeting all other requirements  Today's Visits  Date Type Provider Dept   10/06/22 Telemedicine Evangelista Barron MD [facility]   Showing today's visits and meeting all other requirements  Future Appointments  No visits were found meeting these conditions  Showing future appointments within next 150 days and meeting all other requirements       The patient was identified by name and date of birth  Yared Beauchamp was informed that this is a telemedicine visit and that the visit is being conducted through [facility] and patient was informed this is a secure, HIPAA-complaint platform  She agrees to proceed     My office door was closed  No one else was in the room  She acknowledged consent and understanding of privacy and security of the video platform  The patient has agreed to participate and understands they can discontinue the visit at any time  Patient is aware this is a billable service  Subjective  Yared Beauchamp is a 74 y o  female we follow with a history of abnormal liver enzymes  She was initially diagnosed with fatty liver some question was raised about autoimmune liver disease previous biopsy showed mostly fatty change  She feels well having no symptoms  [place]       HPI     Past Medical History:   Diagnosis Date    Disease of thyroid gland     Hypothyroid     Sjogren's disease ([address] )        Past Surgical History:   Procedure Laterality Date    CARPAL TUNNEL RELEASE       SECTION      COLONOSCOPY      HYSTERECTOMY      OOPHORECTOMY      TUBAL LIGATION         Current Outpatient Medications   Medication Sig Dispense Refill    CALCIUM-VITAMIN D PO Take by mouth      citalopram (CeleXA) 20 mg tablet       fexofenadine (ALLEGRA) 180 MG tablet Take 180 mg by mouth if needed      Multiple Vitamin (multivitamin) tablet Take 1 tablet by mouth daily      NON FORMULARY Garlacin      Omega-3 Fatty Acids (OMEGA-3 FISH OIL PO) Take by mouth      PreviDent 5000 Dry Mouth 1 1 % GEL       Synthroid 112 MCG tablet       TURMERIC PO Take by mouth       No current facility-administered medications for this visit          Allergies   Allergen Reactions    Other Tongue Swelling     Eggplant - dysphagia    Shellfish-Derived Products - Food Allergy Swelling       Review of Systems  Negative except as per the HPI  Video Exam by video awake alert no acute distress skin ENT neuro grossly intact    Vitals:    10/06/22 1549   Weight: 86 2 kg (190 lb)   Height: 5' 5" (1 651 m)       Physical Exam as above    I spent 15 minutes directly with the patient during this visit

## 2022-11-28 ENCOUNTER — TELEPHONE (OUTPATIENT)
Dept: GASTROENTEROLOGY | Facility: CLINIC | Age: 67
End: 2022-11-28

## 2022-11-28 NOTE — TELEPHONE ENCOUNTER
Called patient re her appointment on 12/8 which patient canceled via [address] due to a conflict with work  Patient requested to reschedule her appointment  LVM to call me back on my direct number so that I can assist her with rescheduling her appoointment with PA-C Tilman Opitz to another date

## 2022-11-30 ENCOUNTER — HOSPITAL ENCOUNTER (OUTPATIENT)
Dept: MRI IMAGING | Facility: HOSPITAL | Age: 67
Discharge: HOME | End: 2022-11-30

## 2022-11-30 DIAGNOSIS — M79.644 THUMB PAIN, RIGHT: ICD-10-CM

## 2022-11-30 DIAGNOSIS — M19.131 SCAPHOLUNATE ADVANCED COLLAPSE OF RIGHT WRIST: ICD-10-CM

## 2022-12-01 ENCOUNTER — OFFICE VISIT (OUTPATIENT)
Dept: URGENT CARE | Facility: CLINIC | Age: 67
End: 2022-12-01

## 2022-12-01 VITALS
SYSTOLIC BLOOD PRESSURE: 129 MMHG | RESPIRATION RATE: 18 BRPM | OXYGEN SATURATION: 98 % | DIASTOLIC BLOOD PRESSURE: 79 MMHG | HEART RATE: 96 BPM | TEMPERATURE: 99.2 F

## 2022-12-01 DIAGNOSIS — Z20.822 ENCOUNTER FOR LABORATORY TESTING FOR COVID-19 VIRUS: ICD-10-CM

## 2022-12-01 DIAGNOSIS — J06.9 ACUTE URI: Primary | ICD-10-CM

## 2022-12-01 LAB — HBA1C MFR BLD HPLC: 5.4 %

## 2022-12-01 RX ORDER — ALBUTEROL SULFATE 90 UG/1
2 AEROSOL, METERED RESPIRATORY (INHALATION) EVERY 6 HOURS PRN
Qty: 8.5 G | Refills: 0 | Status: SHIPPED | OUTPATIENT
Start: 2022-12-01

## 2022-12-01 RX ORDER — BENZONATATE 100 MG/1
100 CAPSULE ORAL 3 TIMES DAILY PRN
Qty: 20 CAPSULE | Refills: 0 | Status: SHIPPED | OUTPATIENT
Start: 2022-12-01

## 2022-12-01 NOTE — PROGRESS NOTES
[facility]        NAME: Quincy Khanna is a 74 y o  female  : 1955    MRN: [id number]  DATE: 2022  TIME: 9:49 AM    /79   Pulse 96   Temp 99 2 °F (37 3 °C)   Resp 18   SpO2 98%     Assessment and Plan   Acute URI [J06 9]  1  Acute URI  benzonatate (TESSALON PERLES) 100 mg capsule    albuterol (ProAir HFA) 90 mcg/act inhaler      2  Encounter for laboratory testing for COVID-19 virus  Cov/Flu-Collected at [facility] or [facility]            Patient Instructions       Follow up with PCP in 3-5 days  Proceed to  ER if symptoms worsen  Chief Complaint     Chief Complaint   Patient presents with   • Cold Like Symptoms     Friday:  laryngitis, cough (dry), chest tightness, ECHOLS with stairs  Pt covid tested Tuesday (negative)  Pt exposed to scarlet fever, and other sick family members  History of Present Illness       Pt with 6 days of cough and some minor sob,no fever no chills       Review of Systems   Review of Systems   Constitutional: Negative  HENT: Positive for congestion  Eyes: Negative  Respiratory: Positive for cough and shortness of breath  Cardiovascular: Negative  Gastrointestinal: Negative  Endocrine: Negative  Genitourinary: Negative  Musculoskeletal: Negative  Skin: Negative  Allergic/Immunologic: Negative  Neurological: Negative  Hematological: Negative  Psychiatric/Behavioral: Negative  All other systems reviewed and are negative          Current Medications       Current Outpatient Medications:   •  albuterol (ProAir HFA) 90 mcg/act inhaler, Inhale 2 puffs every 6 (six) hours as needed for wheezing, Disp: 8 5 g, Rfl: 0  •  benzonatate (TESSALON PERLES) 100 mg capsule, Take 1 capsule (100 mg total) by mouth 3 (three) times a day as needed for cough, Disp: 20 capsule, Rfl: 0  •  CALCIUM-VITAMIN D PO, Take by mouth, Disp: , Rfl:   •  citalopram (CeleXA) 20 mg tablet, , Disp: , Rfl:   •  fexofenadine (ALLEGRA) 180 MG tablet, Take 180 mg by mouth if needed, Disp: , Rfl:   •  Multiple Vitamin (multivitamin) tablet, Take 1 tablet by mouth daily, Disp: , Rfl:   •  NON FORMULARY, Garlacin, Disp: , Rfl:   •  Omega-3 Fatty Acids (OMEGA-3 FISH OIL PO), Take by mouth, Disp: , Rfl:   •  PreviDent 5000 Dry Mouth 1 1 % GEL, , Disp: , Rfl:   •  Synthroid 112 MCG tablet, , Disp: , Rfl:   •  TURMERIC PO, Take by mouth, Disp: , Rfl:     Current Allergies     Allergies as of 2022 - Reviewed 2022   Allergen Reaction Noted   • Other Tongue Swelling 2022   • Shellfish-derived products - food allergy Swelling 2021            The following portions of the patient's history were reviewed and updated as appropriate: allergies, current medications, past family history, past medical history, past social history, past surgical history and problem list      Past Medical History:   Diagnosis Date   • Disease of thyroid gland    • Hypothyroid    • Sjogren's disease ([facility] [address] )        Past Surgical History:   Procedure Laterality Date   • CARPAL TUNNEL RELEASE     •  SECTION     • COLONOSCOPY     • HYSTERECTOMY     • OOPHORECTOMY     • TUBAL LIGATION         Family History   Problem Relation Age of Onset   • Breast cancer Mother         late [de-identified] onset   • Prostate cancer Father         diagnosed in his [de-identified]   • No Known Problems Daughter    • No Known Problems Maternal Grandmother    • No Known Problems Maternal Grandfather    • No Known Problems Paternal Grandmother    • No Known Problems Paternal Grandfather    • Colon polyps Neg Hx    • Colon cancer Neg Hx          Medications have been verified  Objective   /79   Pulse 96   Temp 99 2 °F (37 3 °C)   Resp 18   SpO2 98%        Physical Exam     Physical Exam  Vitals and nursing note reviewed  Constitutional:       Appearance: Normal appearance  She is normal weight        Comments: Pt is out of her albuterol inhaler    HENT:      Head: Normocephalic and atraumatic  Right Ear: Tympanic membrane, ear canal and external ear normal       Left Ear: Tympanic membrane, ear canal and external ear normal       Nose: Nose normal       Mouth/Throat:      Mouth: Mucous membranes are moist       Pharynx: Oropharynx is clear  Eyes:      Extraocular Movements: Extraocular movements intact  Conjunctiva/sclera: Conjunctivae normal       Pupils: Pupils are equal, round, and reactive to light  Cardiovascular:      Rate and Rhythm: Normal rate and regular rhythm  Pulses: Normal pulses  Heart sounds: Normal heart sounds  Pulmonary:      Effort: Pulmonary effort is normal       Breath sounds: Normal breath sounds  Abdominal:      General: Abdomen is flat  Bowel sounds are normal       Palpations: Abdomen is soft  Musculoskeletal:         General: Normal range of motion  Cervical back: Normal range of motion and neck supple  Skin:     General: Skin is warm  Capillary Refill: Capillary refill takes less than 2 seconds  Neurological:      General: No focal deficit present  Mental Status: She is alert and oriented to person, place, and time     Psychiatric:         Mood and Affect: Mood normal          Behavior: Behavior normal

## 2022-12-02 LAB
FLUAV RNA RESP QL NAA+PROBE: NEGATIVE
FLUBV RNA RESP QL NAA+PROBE: NEGATIVE
SARS-COV-2 RNA RESP QL NAA+PROBE: NEGATIVE

## 2022-12-12 ENCOUNTER — OFFICE VISIT (OUTPATIENT)
Dept: OBGYN CLINIC | Facility: CLINIC | Age: 67
End: 2022-12-12

## 2022-12-12 VITALS
SYSTOLIC BLOOD PRESSURE: 112 MMHG | HEIGHT: 65 IN | WEIGHT: 188 LBS | BODY MASS INDEX: 31.32 KG/M2 | DIASTOLIC BLOOD PRESSURE: 82 MMHG

## 2022-12-12 DIAGNOSIS — M19.031 ARTHRITIS OF RIGHT WRIST: ICD-10-CM

## 2022-12-12 DIAGNOSIS — M18.0 ARTHRITIS OF CARPOMETACARPAL (CMC) JOINTS OF BOTH THUMBS: Primary | ICD-10-CM

## 2022-12-12 RX ORDER — BETAMETHASONE SODIUM PHOSPHATE AND BETAMETHASONE ACETATE 3; 3 MG/ML; MG/ML
3 INJECTION, SUSPENSION INTRA-ARTICULAR; INTRALESIONAL; INTRAMUSCULAR; SOFT TISSUE
Status: COMPLETED | OUTPATIENT
Start: 2022-12-12 | End: 2022-12-12

## 2022-12-12 RX ORDER — LIDOCAINE HYDROCHLORIDE 10 MG/ML
0.5 INJECTION, SOLUTION INFILTRATION; PERINEURAL
Status: COMPLETED | OUTPATIENT
Start: 2022-12-12 | End: 2022-12-12

## 2022-12-12 RX ADMIN — LIDOCAINE HYDROCHLORIDE 0.5 ML: 10 INJECTION, SOLUTION INFILTRATION; PERINEURAL at 12:20

## 2022-12-12 RX ADMIN — BETAMETHASONE SODIUM PHOSPHATE AND BETAMETHASONE ACETATE 3 MG: 3; 3 INJECTION, SUSPENSION INTRA-ARTICULAR; INTRALESIONAL; INTRAMUSCULAR; SOFT TISSUE at 12:20

## 2022-12-12 NOTE — PATIENT INSTRUCTIONS
What is it [facility] Arthritis? In a normal joint, cartilage covers the end of the bones and serves as a shock absorber to allow smooth, pain-free movement  In osteoarthritis (OA, or “degenerative arthritis”) the cartilage layer wears out, resulting in direct contact between the bones and producing pain and deformity  One of the most common joints to develop OA in the hand is the base of the thumb  The thumb basal joint, also called the carpometacarpal Ray) joint, is a specialized saddle shaped joint that is formed by a small bone of the wrist (trapezium) and the first bone of the thumb (metacarpal)  The saddle shaped joint allows the thumb to have a wide range of motions, including up, down, across the palm, and the ability to pinch (see Figure 1)  Who gets it? OA at the base of the thumb is more commonly seen in women over the age of 36  The exact cause is unknown, but genetics, previous injuries such as fractures or dislocations, and generalized joint laxity may predispose towards development of this type of arthritis  What are the symptoms and signs? The most common symptom is pain at the base of the thumb  The pain can be aggravated by activities that require pinching, such as opening jars, turning door knobs or keys, and writing  Severity can also progress to pain at rest and pain at night  In more severe cases, progressive destruction and mal-alignment of the joint occurs, and a bump develops at the base of the thumb as the metacarpal moves out of the saddle joint  This shift in the joint can cause limited motion and weakness, making pinch difficult (see Figure 2)  The next joint above the [facility] may compensate by loosening, causing it to bend further back (hyperextension)  How is the diagnosis made? The diagnosis is made by history and physical evaluation  Pressure and movement such as twisting will produce pain at the joint  A grinding sensation may also be present at the joint (see Figure 3)  X-rays are used to confirm the diagnosis, although symptom severity often does not correlate with x-ray findings  What are the treatment options? Less severe thumb arthritis will usually respond to non-surgical care  Arthritis medication, splinting and limited cortisone injections may help alleviate pain  A hand therapist might provide a variety of rigid and non-rigid splints which can be used while sleeping or during activities  Patients with advanced disease or who fail non-surgical treatment may be candidates for surgical reconstruction  A variety of surgical techniques are available that can successfully reduce or eliminate pain  Surgical procedures include removal of arthritic bone and joint reconstruction (arthroplasty), joint fusion, bone realignment, and even arthroscopy in select cases  A consultation with your hand surgeon can help decide the best option for you (see Figure 4)  © 2012 American Society for Surgery of the Hand  www handcare  org

## 2022-12-12 NOTE — PROGRESS NOTES
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     Plan:   X-rays and right wrist MRI was reviewed   The decision was made to proceed with a left thumb CMC CSI and right wrist STT joint CSI   The CSI's were performed in the office without complication   Post injection protocol/expectations were reviewed   The CSI can be repeated on a 3 month basis, as needed   If the CSI is not beneficial for her, repeat CSI's may be performed using a different steroid     Follow Up:  3  month(s)    To Do Next Visit:  Re-evaluation     General Discussions:  CMC Arthritis: The anatomy and physiology of carpometacarpal joint arthritis was discussed with the patient today in the office  Deterioration of the articular cartilage eventually leads to hypermobility at the thumb [facility] joint, resulting in joint subluxation, osteophyte formation, cystic changes within the trapezium and base of the first metacarpal, as well as subchondral sclerosis  Eventually, pain, limited mobility, and compensatory hyperextension at the metacarpophalangeal joint may develop  While normal activity and usage of the thumb joint may provide a painful experience to the patient, this typically does not result in damage to the thumb or hand  Treatment options include resting thumb spica splints to decreased joint edema, pain, and inflammation  Therapy exercises to strengthen the thenar musculature may relieve pain, but do not alter the overall continued development of osteoarthritis  Oral medications, topical medications, corticosteroid injections may decrease pain and increase overall function  Eventually, approximately 5% of patients may require surgical intervention         _____________________________________________________  CHIEF COMPLAINT:  Chief Complaint   Patient presents with   • Right Wrist - Pain     XR- 10/6/22  MRI- 11/30/22   • Left Thumb - Pain     CMC previously seen Meng Uriarte PA-C SUBJECTIVE:  Reg Moyer is a 74 y o  female who presents with pain to the base of both thumbs  I am seeing Alberto Rosado in consultation at the request of Radhames Chandra PA-C  She notes symptoms started many years ago, aprox  8  She denies any injury or trauma  She feels her right side is worse then her left  Right side will radiate up and down the arm  Pain will worsen with pressure, such as pinching, grasping, twisting door knobs, washing her hair or if she was to hit her right hand  She is wearing braces at night  She will also wear a glove on the left side  She will take Ibuprofen for pain control at night as it makes her tired     Radiation: Yes, up and down the arm on the right   Previous Treatments: NSAIDs and bracing with only partial relief  Associated symptoms: None  Handedness: right  Work status: computer work     PAST MEDICAL HISTORY:  Past Medical History:   Diagnosis Date   • Disease of thyroid gland    • Hypothyroid    • Sjogren's disease ([facility] [address] )        PAST SURGICAL HISTORY:  Past Surgical History:   Procedure Laterality Date   • CARPAL TUNNEL RELEASE     •  SECTION     • COLONOSCOPY     • HYSTERECTOMY     • OOPHORECTOMY     • TUBAL LIGATION         FAMILY HISTORY:  Family History   Problem Relation Age of Onset   • Breast cancer Mother         late [de-identified] onset   • Prostate cancer Father         diagnosed in his [de-identified]   • No Known Problems Daughter    • No Known Problems Maternal Grandmother    • No Known Problems Maternal Grandfather    • No Known Problems Paternal Grandmother    • No Known Problems Paternal Grandfather    • Colon polyps Neg Hx    • Colon cancer Neg Hx        SOCIAL HISTORY:  Social History     Tobacco Use   • Smoking status: Former   • Smokeless tobacco: Never   Vaping Use   • Vaping Use: Never used   Substance Use Topics   • Alcohol use: Yes     Comment: rarely/social   • Drug use: Never       MEDICATIONS:    Current Outpatient Medications:   •  CALCIUM-VITAMIN D PO, Take by mouth, Disp: , Rfl:   •  citalopram (CeleXA) 20 mg tablet, , Disp: , Rfl:   •  fexofenadine (ALLEGRA) 180 MG tablet, Take 180 mg by mouth if needed, Disp: , Rfl:   •  Multiple Vitamin (multivitamin) tablet, Take 1 tablet by mouth daily, Disp: , Rfl:   •  NON FORMULARY, Garlacin, Disp: , Rfl:   •  Omega-3 Fatty Acids (OMEGA-3 FISH OIL PO), Take by mouth, Disp: , Rfl:   •  PreviDent 5000 Dry Mouth 1 1 % GEL, , Disp: , Rfl:   •  Synthroid 112 MCG tablet, , Disp: , Rfl:   •  TURMERIC PO, Take by mouth, Disp: , Rfl:     ALLERGIES:  Allergies   Allergen Reactions   • Other Tongue Swelling     Eggplant - dysphagia   • Shellfish-Derived Products - Food Allergy Swelling       REVIEW OF SYSTEMS:  Pertinent items are noted in HPI  A comprehensive review of systems was negative      LABS:  HgA1c:   Lab Results   Component Value Date    HGBA1C 5 4 12/01/2022     BMP:   Lab Results   Component Value Date    GLUCOSE 90 02/12/2014    CALCIUM 8 5 02/12/2014     02/12/2014    K 4 3 09/22/2022    CO2 24 09/22/2022     09/22/2022    BUN 22 09/22/2022    CREATININE 0 84 09/22/2022         _____________________________________________________  PHYSICAL EXAMINATION:  Vital signs: /82   Ht 5' 5" (1 651 m)   Wt 85 3 kg (188 lb)   BMI 31 28 kg/m²   General: well developed and well nourished, alert, oriented times 3 and appears comfortable  Psychiatric: Normal  HEENT: Trachea Midline, No torticollis  Cardiovascular: No discernable arrhythmia  Pulmonary: No wheezing or stridor  Abdomen: No rebound or guarding  Extremities: No peripheral edema  Skin: No masses, erythema, lacerations, fluctation, ulcerations  Neurovascular: Sensation Intact to the Median, Ulnar, Radial Nerve, Motor Intact to the Median, Ulnar, Radial Nerve and Pulses Intact    MUSCULOSKELETAL EXAMINATION:    LEFT SIDE:  CMC: Positive grind and Positive tendnerness CMC, no erythema, ecchymosis or edema     RIGHT SIDE:  Wrist:  No erythema, ecchymosis or significant edema, no SL tenderness, thumb CMC joint is mildly tender to palpation, FCR non tender to palpation, STT joint is tender to palpation  _____________________________________________________  STUDIES REVIEWED:  Images were reviewed in PACS by Dr Ruddy Negron and demonstrate: stage 3 left thumb CMC arthritis  Stage 4 right thumb CMC arthritis with STT joint changes  No evidence of SL tear or SLAC wrist on the right  PROCEDURES PERFORMED:  Small joint arthrocentesis: L thumb CMC  [place]ont Protocol:  Consent: Verbal consent obtained  Written consent not obtained  Risks and benefits: risks, benefits and alternatives were discussed  Consent given by: patient  Time out: Immediately prior to procedure a "time out" was called to verify the correct patient, procedure, equipment, support staff and site/side marked as required  Site marked: the operative site was marked  Patient identity confirmed: verbally with patient    Supporting Documentation  Indications: pain   Procedure Details  Location: thumb - L thumb CMC  Preparation: Patient was prepped and draped in the usual sterile fashion  Needle size: 25 G  Ultrasound guidance: no  Medications administered: 3 mg betamethasone acetate-betamethasone sodium phosphate 6 (3-3) mg/mL; 0 5 mL lidocaine 1 %    Patient tolerance: patient tolerated the procedure well with no immediate complications  Dressing:  Sterile dressing applied    Small joint arthrocentesis  Universal Protocol:  Consent: Verbal consent obtained  Written consent not obtained  Risks and benefits: risks, benefits and alternatives were discussed  Consent given by: patient  Time out: Immediately prior to procedure a "time out" was called to verify the correct patient, procedure, equipment, support staff and site/side marked as required    Site marked: the operative site was marked  Patient identity confirmed: verbally with patient    Supporting Documentation  Indications: pain   Procedure Details  Location: right wrist STT joint   Preparation: Patient was prepped and draped in the usual sterile fashion  Needle size: 25 G  Ultrasound guidance: no  Medications administered: 0 5 mL lidocaine 1 %; 3 mg betamethasone acetate-betamethasone sodium phosphate 6 (3-3) mg/mL    Patient tolerance: patient tolerated the procedure well with no immediate complications  Dressing:  Sterile dressing applied             Scribe Attestation    I,:  Melody Foss am acting as a scribe while in the presence of the attending physician :       I,:  Doris Chandra MD personally performed the services described in this documentation    as scribed in my presence :

## 2022-12-28 ENCOUNTER — TELEPHONE (OUTPATIENT)
Dept: OBGYN CLINIC | Facility: HOSPITAL | Age: 67
End: 2022-12-28

## 2022-12-28 NOTE — TELEPHONE ENCOUNTER
Caller: Patient    Doctor/Office: Fco BETH#: N/A      What needs to be faxed: Office notes, MRI right hand, and x-rays of left and right hand    [facility]    Fax#: [phone]

## 2023-05-02 ENCOUNTER — OFFICE VISIT (OUTPATIENT)
Dept: GASTROENTEROLOGY | Facility: CLINIC | Age: 68
End: 2023-05-02

## 2023-05-02 ENCOUNTER — PREP FOR PROCEDURE (OUTPATIENT)
Dept: INTERVENTIONAL RADIOLOGY/VASCULAR | Facility: CLINIC | Age: 68
End: 2023-05-02

## 2023-05-02 VITALS
HEART RATE: 65 BPM | SYSTOLIC BLOOD PRESSURE: 120 MMHG | HEIGHT: 65 IN | DIASTOLIC BLOOD PRESSURE: 80 MMHG | WEIGHT: 186 LBS | OXYGEN SATURATION: 98 % | TEMPERATURE: 98.7 F | BODY MASS INDEX: 30.99 KG/M2

## 2023-05-02 DIAGNOSIS — R79.89 ELEVATED LFTS: ICD-10-CM

## 2023-05-02 DIAGNOSIS — R74.8 ELEVATED LIVER ENZYMES: Primary | ICD-10-CM

## 2023-05-02 DIAGNOSIS — E06.3 HASHIMOTO'S THYROIDITIS: Primary | ICD-10-CM

## 2023-05-02 RX ORDER — SODIUM FLUORIDE 6 MG/ML
PASTE, DENTIFRICE DENTAL
COMMUNITY
Start: 2023-03-07

## 2023-05-02 NOTE — PROGRESS NOTES
[facility] - Outpatient Consultation  Dora Taveras 79 y o  female MRN: [id number]  Encounter: [id number]        ASSESSMENT AND PLAN     1  Hashimoto's thyroiditis  On synthyroid    2  Elevated LFTs  Obtain repeat CMP, anti-smooth muscle antibody, antimitochondrial antibody we will refer to IR team for liver biopsy  - Comprehensive metabolic panel; Future  - Anti-smooth muscle antibody, IgG; Future  - Antimitochondrial antibody; Future  - Iron Panel (Includes Ferritin, Iron Sat%, Iron, and TIBC); Future  - IgG, IgA, IgM; Future  - Comprehensive metabolic panel  - Anti-smooth muscle antibody, IgG  - Antimitochondrial antibody  - IgG, IgA, IgM  - LYNSEY Screen w/ Reflex to Titer/Pattern; Future  - Ambulatory Referral to Interventional Radiology; Future    3  Hepatic steatosis  4  F2 fibrosis  Hepatic steatosis as needed on the ultrasound in January 2022  HCV FibroSure which was obtained in June 2022 showing F2 fibrosis however unclear if this is actually true given she has no hx of hepatitis C  Will follow on biopsy  Follow-up in 3 to 4 weeks after the liver biopsy  Orders Placed This Encounter   Procedures    Comprehensive metabolic panel    Anti-smooth muscle antibody, IgG    Antimitochondrial antibody    IgG, IgA, IgM    LYNSEY Screen w/ Reflex to Titer/Pattern    Ambulatory Referral to Interventional Radiology     HISTORY OF PRESENT ILLNESS     HPI   61-year-old female with past medical history including but not limited to Sjogren syndrome with Raynaud's phenomenon, Hashimoto's thyroiditis on Synthroid, remote history of syphilis status posttreatment who presents today upon referral from primary care provider for chronic elevation in LFTs  Patient reports that ever since the age of 47s she was told she has had elevated liver enzymes    She has underwent extensive work-up in the past which has mostly been unremarkable except for positive LYNSEY, positive antimitochondrial antibody ,  Negative anti-smooth muscle antibody  For the same she had liver biopsy within the last 20 years which showed mild hepatitis with fat, results are not available for review but this information is based on chart review  In 2022 she had CMP which showed AST 57, ALT 87, alk phos 243, T  bili normal   She had HCV FibroSure done which showed F2 fibrosis however she denies any known history of hepatitis C  Last abdominal imaging was in 2022 which showed hepatomegaly with mild hepatic steatosis but otherwise unremarkable  REVIEW OF SYSTEMS     CONSTITUTIONAL: Denies any fever, chills, rigors, and weight loss  HEENT: No earache or tinnitus  Denies hearing loss or visual disturbances  CARDIOVASCULAR: No chest pain or palpitations  RESPIRATORY: Denies any cough, hemoptysis, shortness of breath or dyspnea on exertion  GASTROINTESTINAL: As noted in the History of Present Illness  GENITOURINARY: No problems with urination  Denies any hematuria or dysuria  NEUROLOGIC: No dizziness or vertigo, denies headaches  MUSCULOSKELETAL: Denies any muscle or joint pain  SKIN: Denies skin rashes or itching  ENDOCRINE: Denies excessive thirst  Denies intolerance to heat or cold  PSYCHOSOCIAL: Denies depression or anxiety  Denies any recent memory loss  Historical information     Past Medical History:   Diagnosis Date    Anemia     I stopped taking iron supplements after I went through menop    Disease of thyroid gland     Hypothyroid     [facility]      Past Surgical History:   Procedure Laterality Date    CARPAL TUNNEL RELEASE       SECTION      COLONOSCOPY      HYSTERECTOMY      LIVER BIOPSY  2017?     OOPHORECTOMY      TUBAL LIGATION       Social History   Social History     Substance and Sexual Activity   Alcohol Use Yes    Comment: 1 drink social drinker     Social History     Substance and Sexual Activity   Drug Use Never "    Social History     Tobacco Use   Smoking Status Former    Packs/day: 0 25    Years: 15 00    Pack years: 3 75    Types: Cigarettes    Start date: 1971   Zarina Bai Quit date: 1979    Years since quittin 9   Smokeless Tobacco Never   Tobacco Comments    I was never a heavy smoker     Family History   Problem Relation Age of Onset    Breast cancer Mother         late [de-identified] onset   Zarina Bai Arthritis Mother     Hypothyroidism Mother     Vision loss Mother     Prostate cancer Father         diagnosed in his [de-identified]    Asthma Father     Hypothyroidism Father     No Known Problems Daughter     Anemia Maternal Grandmother     No Known Problems Maternal Grandfather     No Known Problems Paternal Grandmother     No Known Problems Paternal Grandfather     Colon polyps Neg Hx     Colon cancer Neg Hx        Allergies       Current Outpatient Medications:     CALCIUM-VITAMIN D PO    citalopram (CeleXA) 20 mg tablet    fexofenadine (ALLEGRA) 180 MG tablet    Multiple Vitamin (multivitamin) tablet    NON FORMULARY    Omega-3 Fatty Acids (OMEGA-3 FISH OIL PO)    PreviDent 5000 Dry Mouth 1 1 % GEL    Synthroid 112 MCG tablet    TURMERIC PO    Sodium Fluoride 5000 PPM 1 1 % PSTE    Allergies   Allergen Reactions    Other Tongue Swelling     Eggplant - dysphagia    Shellfish-Derived Products - Food Allergy Swelling           Objective assessment       Blood pressure 120/80, pulse 65, temperature 98 7 °F (37 1 °C), temperature source Tympanic, height 5' 5\" (1 651 m), weight 84 4 kg (186 lb), SpO2 98 %  Body mass index is 30 95 kg/m²          PHYSICAL EXAM:         General Appearance:   Alert, cooperative, no distress   HEENT:   Normocephalic     Neck:  symmetrical   Lungs:   Breathing unlabored, able to speak in full sentences    Heart[de-identified]   Regular rate    Abdomen:   Non tender   Genitalia:   Deferred    Rectal:   Deferred    Extremities:  No cyanosis, or edema    Pulses:  Strong   Skin:  No jaundice, rashes, or " lesions    Lymph nodes:  No palpable cervical lymphadenopathy        Lab Results:      No visits with results within 1 Day(s) from this visit  Latest known visit with results is:   Orders Only on 12/01/2022   Component Date Value    Hemoglobin A1C 12/01/2022 5 4          Radiology Results:      No results found        Walker Prader, MD  [facility] Fellow

## 2023-05-23 ENCOUNTER — TELEPHONE (OUTPATIENT)
Dept: INTERVENTIONAL RADIOLOGY/VASCULAR | Facility: HOSPITAL | Age: 68
End: 2023-05-23

## 2023-05-24 ENCOUNTER — TELEPHONE (OUTPATIENT)
Dept: INTERVENTIONAL RADIOLOGY/VASCULAR | Facility: HOSPITAL | Age: 68
End: 2023-05-24

## 2023-05-25 ENCOUNTER — TELEPHONE (OUTPATIENT)
Dept: INTERVENTIONAL RADIOLOGY/VASCULAR | Facility: HOSPITAL | Age: 68
End: 2023-05-25

## 2023-05-30 ENCOUNTER — TELEPHONE (OUTPATIENT)
Dept: GASTROENTEROLOGY | Facility: CLINIC | Age: 68
End: 2023-05-30

## 2023-05-30 ENCOUNTER — HOSPITAL ENCOUNTER (OUTPATIENT)
Dept: INTERVENTIONAL RADIOLOGY/VASCULAR | Facility: HOSPITAL | Age: 68
Discharge: HOME | End: 2023-05-30
Attending: RADIOLOGY | Admitting: RADIOLOGY

## 2023-05-30 VITALS
OXYGEN SATURATION: 95 % | WEIGHT: 181 LBS | DIASTOLIC BLOOD PRESSURE: 73 MMHG | SYSTOLIC BLOOD PRESSURE: 147 MMHG | RESPIRATION RATE: 17 BRPM | BODY MASS INDEX: 30.16 KG/M2 | HEART RATE: 78 BPM | TEMPERATURE: 97.8 F | HEIGHT: 65 IN

## 2023-05-30 DIAGNOSIS — R74.8 ELEVATED LIVER ENZYMES: ICD-10-CM

## 2023-05-30 LAB
ANION GAP SERPL CALCULATED.3IONS-SCNC: 5 MMOL/L (ref 4–13)
BUN SERPL-MCNC: 21 MG/DL (ref 5–25)
CALCIUM SERPL-MCNC: 8.7 MG/DL (ref 8.4–10.2)
CHLORIDE SERPL-SCNC: 107 MMOL/L (ref 96–108)
CO2 SERPL-SCNC: 27 MMOL/L (ref 21–32)
CREAT SERPL-MCNC: 0.83 MG/DL (ref 0.6–1.3)
ERYTHROCYTE [DISTWIDTH] IN BLOOD BY AUTOMATED COUNT: 12.4 % (ref 11.6–15.1)
GFR SERPL CREATININE-BSD FRML MDRD: 73 ML/MIN/1.73SQ M
GLUCOSE P FAST SERPL-MCNC: 92 MG/DL (ref 65–99)
GLUCOSE SERPL-MCNC: 92 MG/DL (ref 65–140)
HCT VFR BLD AUTO: 36.4 % (ref 34.8–46.1)
HGB BLD-MCNC: 11.8 G/DL (ref 11.5–15.4)
INR PPP: 0.86 (ref 0.84–1.19)
MCH RBC QN AUTO: 29.6 PG (ref 26.8–34.3)
MCHC RBC AUTO-ENTMCNC: 32.4 G/DL (ref 31.4–37.4)
MCV RBC AUTO: 91 FL (ref 82–98)
PLATELET # BLD AUTO: 177 THOUSANDS/UL (ref 149–390)
PMV BLD AUTO: 9.9 FL (ref 8.9–12.7)
POTASSIUM SERPL-SCNC: 3.9 MMOL/L (ref 3.5–5.3)
PROTHROMBIN TIME: 12.4 SECONDS (ref 11.6–14.5)
RBC # BLD AUTO: 3.99 MILLION/UL (ref 3.81–5.12)
SODIUM SERPL-SCNC: 139 MMOL/L (ref 135–147)
WBC # BLD AUTO: 4.3 THOUSAND/UL (ref 4.31–10.16)

## 2023-05-30 RX ORDER — IBUPROFEN 200 MG
400 TABLET ORAL EVERY 6 HOURS PRN
COMMUNITY

## 2023-05-30 RX ORDER — MIDAZOLAM HYDROCHLORIDE 2 MG/2ML
INJECTION, SOLUTION INTRAMUSCULAR; INTRAVENOUS AS NEEDED
Status: COMPLETED | OUTPATIENT
Start: 2023-05-30 | End: 2023-05-30

## 2023-05-30 RX ORDER — FENTANYL CITRATE 50 UG/ML
INJECTION, SOLUTION INTRAMUSCULAR; INTRAVENOUS AS NEEDED
Status: COMPLETED | OUTPATIENT
Start: 2023-05-30 | End: 2023-05-30

## 2023-05-30 RX ORDER — LIDOCAINE HYDROCHLORIDE 10 MG/ML
INJECTION, SOLUTION EPIDURAL; INFILTRATION; INTRACAUDAL; PERINEURAL AS NEEDED
Status: COMPLETED | OUTPATIENT
Start: 2023-05-30 | End: 2023-05-30

## 2023-05-30 RX ADMIN — MIDAZOLAM 2 MG: 1 INJECTION INTRAMUSCULAR; INTRAVENOUS at 08:47

## 2023-05-30 RX ADMIN — FENTANYL CITRATE 100 MCG: 50 INJECTION, SOLUTION INTRAMUSCULAR; INTRAVENOUS at 08:47

## 2023-05-30 RX ADMIN — LIDOCAINE HYDROCHLORIDE 6 ML: 10 INJECTION, SOLUTION EPIDURAL; INFILTRATION; INTRACAUDAL; PERINEURAL at 08:47

## 2023-05-30 NOTE — H&P
Interventional Radiology  History and Physical 2023     Froylan Stein   1955   [id number]    Assessment/Plan:  Patient here for random Liver biopsy for elevated liver enzymes  Problem List Items Addressed This Visit        Other    Elevated liver enzymes    Relevant Orders    IR biopsy liver random          Subjective:     Patient ID: Charles Carbone is a 79 y o  female  History of Present Illness  Patient with elevated liver enzymes here for random liver biopsy  Review of Systems      Past Medical History:   Diagnosis Date   • Anemia     I stopped taking iron supplements after I went through menop   • Disease of thyroid gland    • Hypothyroid    • Sjogren's disease ([facility] [address] )         Past Surgical History:   Procedure Laterality Date   • CARPAL TUNNEL RELEASE     •  SECTION     • COLONOSCOPY     • HYSTERECTOMY     • LIVER BIOPSY  ?    • OOPHORECTOMY     • TUBAL LIGATION          Social History     Tobacco Use   Smoking Status Former   • Packs/day: 0 25   • Years: 15 00   • Total pack years: 3 75   • Types: Cigarettes   • Start date: 1971   • Quit date: 1979   • Years since quittin 0   Smokeless Tobacco Never   Tobacco Comments    I was never a heavy smoker        Social History     Substance and Sexual Activity   Alcohol Use Yes    Comment: 1 drink social drinker        Social History     Substance and Sexual Activity   Drug Use Never        Allergies   Allergen Reactions   • Other Tongue Swelling     Eggplant - dysphagia   • Shellfish-Derived Products - Food Allergy Swelling       Current Outpatient Medications   Medication Sig Dispense Refill   • CALCIUM-VITAMIN D PO Take by mouth     • citalopram (CeleXA) 20 mg tablet      • fexofenadine (ALLEGRA) 180 MG tablet Take 180 mg by mouth if needed     • ibuprofen (MOTRIN) 200 mg tablet Take 400 mg by mouth every 6 (six) hours as needed for mild pain     • Multiple Vitamin (multivitamin) tablet Take 1 tablet by "mouth daily     • NON FORMULARY Garlacin     • Omega-3 Fatty Acids (OMEGA-3 FISH OIL PO) Take by mouth     • PreviDent 5000 Dry Mouth 1 1 % GEL      • Synthroid 112 MCG tablet      • TURMERIC PO Take by mouth     • Sodium Fluoride 5000 PPM 1 1 % PSTE USE 1-2 TIMES AS INSTRUCTED       Current Facility-Administered Medications   Medication Dose Route Frequency Provider Last Rate Last Admin   • fentanyl citrate (PF) 100 MCG/2ML   Intravenous PRN Angelo Webb MD   100 mcg at 05/30/23 0847   • lidocaine (PF) (XYLOCAINE-MPF) 1 % injection    PRN Angelo Webb MD   6 mL at 05/30/23 0847   • midazolam (VERSED) injection    PRN Angelo Webb MD   2 mg at 05/30/23 0847          Objective:    Vitals:    05/30/23 0807 05/30/23 0838 05/30/23 0848 05/30/23 0853   BP: 130/63 128/59 123/60 131/59   Pulse: 86 66 65 67   Resp: 20 16 16 13   Temp: (!) 97 4 °F (36 3 °C)      TempSrc: Temporal      SpO2: 95% 94% 94% 99%   Weight: 82 1 kg (181 lb)      Height: 5' 5\" (1 651 m)           Physical Exam      No results found for: \"BNP\"   Lab Results   Component Value Date    HCT 36 4 05/30/2023    HGB 11 8 05/30/2023    MCV 91 05/30/2023     05/30/2023    WBC 4 30 (L) 05/30/2023     Lab Results   Component Value Date    INR 0 86 05/30/2023    PROTIME 12 4 05/30/2023     No results found for: \"PTT\"      I have personally reviewed pertinent imaging and laboratory results  Code Status: No Order  Advance Directive and Living Will:      Power of :    POLST:      This text is generated with voice recognition software  There may be translation, syntax,  or grammatical errors  If you have any questions, please contact the dictating provider     "

## 2023-05-30 NOTE — BRIEF OP NOTE (RAD/CATH)
INTERVENTIONAL RADIOLOGY PROCEDURE NOTE    Date: 5/30/2023    Procedure: Liver biopsy  Procedure Summary     Date: 05/30/23 Room / Location: [place] [address] Interventional Radiology    Anesthesia Start:  Anesthesia Stop:     Procedure: IR BIOPSY LIVER RANDOM Diagnosis:       Elevated liver enzymes      (elevated liver enzymes)    Scheduled Providers:  Responsible Provider:     Anesthesia Type: Not recorded ASA Status: Not recorded          Preoperative diagnosis:   1  Elevated liver enzymes         Postoperative diagnosis: Same  Surgeon: Shaina Vega MD     Assistant: None  No qualified resident was available  Blood loss: None    Specimens: Liver-right lobe     Findings: Right lobe 18 G core biopsy x 2 under US guidance with D-stat plug in tract  Complications: None immediate      Anesthesia: conscious sedation

## 2023-05-30 NOTE — DISCHARGE INSTRUCTIONS
Percutaneous Liver Biopsy   WHAT YOU NEED TO KNOW:   A PLB is a procedure to remove a sample of tissue from your liver  The sample can be sent to a lab and tested for liver disease, cancer, or infection  After the procedure you may have pain and bruising at the biopsy site  You may also have pain in your right shoulder  These symptoms should get better in 48 to 72 hours  DISCHARGE INSTRUCTIONS:     [address] and [place] patients,  Contact Interventional Radiology at [phone] PATIENTS: Contact Interventional Radiology at [phone]   [facility] PATIENTS: Contact Interventional Radiology at [phone] if:    Fever greater than 101 or chills  You have severe pain in your abdomen  Your abdomen is larger than usual and feels hard  Your neck is more swollen and you have trouble swallowing  You feel weak or dizzy  Your heart is beating faster than usual    Your pain does not get better after you take pain medicine  Your wound is red, swollen, or draining pus  You have nausea or are vomiting  Your skin is itchy, swollen, or you have a rash  You have questions or concerns about your condition or care  Medicines:   Acetaminophen decreases pain and fever  It is available without a doctor's order  Acetaminophen can cause liver damage if not taken correctly  Take your home medicine as directed  Resume your normal diet  Small sips of flat soda will help with mild nausea  Self-care:   Rest as directed  Do not play sports, exercise, or lift anything heavier than 5 pounds for up to 1 week  Apply firm, steady pressure if bleeding occurs  A small amount of bleeding from your wound is possible  Apply pressure with a clean gauze or towel for 5 to 10 minutes  Call 911 if bleeding becomes heavy or does not stop  Ask your healthcare provider when to take your blood thinner or antiplatelet medicine  You may need to wait 24 to 72 hours to take your medicine   This will prevent bleeding  Follow up with your healthcare provider as directed: Write down your questions so you remember to ask them during your visits

## 2023-05-30 NOTE — TELEPHONE ENCOUNTER
Patients GI provider:  Dr Hackett Aid    Number to return call: [phone]    Reason for call: Patient calling to schedule her follow up after the liver biopsy  Patient states that she was to follow up in 3-4 weeks after biopsy  Next available appointment is August  Patient scheduled the appointment but wanted to check with provider to verify if it is okay to wait until August or if she needs to be seen sooner      Scheduled procedure/appointment date if applicable: Apt 0/22/98

## 2023-05-30 NOTE — SEDATION DOCUMENTATION
Liver bx completed  Patient tolerated well  Band aid to site  Specimen sent to lab  Awake and stable for transfer to [facility]  Report and care assumed by primary RN

## 2023-05-30 NOTE — PERIOPERATIVE NURSING NOTE
Pt arrived in post op with large bandaid at [facility]  Dressing is clean, dry and intact  No noted drainage  Pt denies pain

## 2023-05-31 NOTE — TELEPHONE ENCOUNTER
Returned patient's call, rs 8/14 appt to 6/6 with Dr Jasper Lora (Dr Lilliana Wood will be in [facility]), to discuss liver biopsy results

## 2023-06-02 ENCOUNTER — OFFICE VISIT (OUTPATIENT)
Dept: DERMATOLOGY | Facility: CLINIC | Age: 68
End: 2023-06-02

## 2023-06-02 VITALS — HEIGHT: 65 IN | WEIGHT: 181 LBS | TEMPERATURE: 97.5 F | BODY MASS INDEX: 30.16 KG/M2

## 2023-06-02 DIAGNOSIS — L98.9 SKIN LESION: Primary | ICD-10-CM

## 2023-06-02 PROCEDURE — NC001 PR NO CHARGE: Performed by: DERMATOLOGY

## 2023-06-02 NOTE — RESULT ENCOUNTER NOTE
Sent a [facility] for liver bx results  Encouraged her to obtain blood work prior to the next visit
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
## 2023-06-02 NOTE — PATIENT INSTRUCTIONS
SEBORRHEIC KERATOSIS; NON-INFLAMED    Assessment and Plan:  Based on a thorough discussion of this condition and the management approach to it (including a comprehensive discussion of the known risks, side effects and potential benefits of treatment), the patient (family) agrees to implement the following specific plan:  Reassured benign    Seborrheic Keratosis  A seborrheic keratosis is a harmless warty spot that appears during adult life as a common sign of skin aging. Seborrheic keratoses can arise on any area of skin, covered or uncovered, with the usual exception of the palms and soles. They do not arise from mucous membranes. Seborrheic keratoses can have highly variable appearance. Seborrheic keratoses are extremely common. It has been estimated that over 90% of adults over the age of 61 years have one or more of them. They occur in males and females of all races, typically beginning to erupt in the 35s or 45s. They are uncommon under the age of 21 years. The precise cause of seborrhoeic keratoses is not known. Seborrhoeic keratoses are considered degenerative in nature. As time goes by, seborrheic keratoses tend to become more numerous. Some people inherit a tendency to develop a very large number of them; some people may have hundreds of them. The name "seborrheic keratosis" is misleading, because these lesions are not limited to a seborrhoeic distribution (scalp, mid-face, chest, upper back), nor are they formed from sebaceous glands, nor are they associated with sebum -- which is greasy.   Seborrheic keratosis may also be called "SK," "Seb K," "basal cell papilloma," "senile wart," or "barnacle."      Researchers have noted:  Eruptive seborrhoeic keratoses can follow sunburn or dermatitis  Skin friction may be the reason they appear in body folds  Viral cause (e.g., human papillomavirus) seems unlikely  Stable and clonal mutations or activation of FRFR3, PIK3CA, OSCAR, AKT1 and EGFR genes are found in seborrhoeic keratoses  Seborrhoeic keratosis can arise from solar lentigo  FRFR3 mutations also arise in solar lentigines. These mutations are associated with increased age and location on the head and neck, suggesting a role of ultraviolet radiation in these lesions  Seborrheic keratoses do not harbour tumour suppressor gene mutations  Epidermal growth factor receptor inhibitors, which are used to treat some cancers, often result in an increase in verrucal (warty) keratoses. There is no easy way to remove multiple lesions on a single occasion. Unless a specific lesion is "inflamed" and is causing pain or stinging/burning or is bleeding, most insurance companies do not authorize treatment.     SKIN LESION    Assessment and Plan:  Based on a thorough discussion of this condition and the management approach to it (including a comprehensive discussion of the known risks, side effects and potential benefits of treatment), the patient (family) agrees to implement the following specific plan:  Apply Imiquimod 5%  Cream 3 times per week for 6 weeks to right upper arm  Can consider   Follow up in 3 months

## 2023-06-05 ENCOUNTER — TELEPHONE (OUTPATIENT)
Age: 68
End: 2023-06-05

## 2023-06-05 RX ORDER — IMIQUIMOD 12.5 MG/.25G
CREAM TOPICAL
Qty: 30 EACH | Refills: 0 | Status: SHIPPED | OUTPATIENT
Start: 2023-06-05

## 2023-06-05 NOTE — TELEPHONE ENCOUNTER
Phone call to patient regarding to schedule her 3 month office visit  Patient was to schedule 3 month appointment for applying Imiquimod 5 % cream to areas, but we couldn't due to CV having the emergency of Friday  LVM for patient to call me back

## 2023-06-06 ENCOUNTER — TELEPHONE (OUTPATIENT)
Dept: DERMATOLOGY | Facility: CLINIC | Age: 68
End: 2023-06-06

## 2023-06-06 ENCOUNTER — OFFICE VISIT (OUTPATIENT)
Dept: GASTROENTEROLOGY | Facility: CLINIC | Age: 68
End: 2023-06-06
Payer: COMMERCIAL

## 2023-06-06 VITALS
BODY MASS INDEX: 29.99 KG/M2 | SYSTOLIC BLOOD PRESSURE: 124 MMHG | TEMPERATURE: 97.6 F | DIASTOLIC BLOOD PRESSURE: 76 MMHG | WEIGHT: 180 LBS | HEIGHT: 65 IN

## 2023-06-06 DIAGNOSIS — R79.89 ELEVATED LFTS: Primary | ICD-10-CM

## 2023-06-06 PROCEDURE — 99213 OFFICE O/P EST LOW 20 MIN: CPT | Performed by: INTERNAL MEDICINE

## 2023-06-06 NOTE — PROGRESS NOTES
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  Repeat LFTs showing down trending AST and ALT  - discussed in detail that the given non definitve biopsy results and down trending LFTs, moving forward we have two options, one is to initiate immunosuppressive therapy for AIH or to continue to monitor LFTs serially qmonthly for 3 months;  - she prefers serial monitoring for now which is reasonable  - discussed that if LFTs do not improve or worsen, will recommend initiating treatment    FUP in 3 mo or sooner if needed  There are no diagnoses linked to this encounter   ______________________________________________________________________    SUBJECTIVE:      73-year-old female with past medical history including but not limited to Sjogren syndrome with Raynaud's phenomenon, Hashimoto's thyroiditis on Synthroid, remote history of syphilis status posttreatment who presents today for follow up for chronic elevation in LFTs  Since last visit in May 2023, patient underwent liver biopsy for elevated AST/ALT, positive LYNSEY, positive AMA blood work  She underwent liver biopsy 5/30/23 which showed patchy portal and interface hepatitis and patchy danielito-portal fibrosis without evidence of cholestasis  She had repeat blood work obtained 5/18/2023 showing AST 51, ALT 52,  which is down trending from 9/2022, ASMA negative, antimitochondrial antibody 143  During this visit, she reports feeling well overall   Reports occsaional RUQ abdominal pain which is non-distressing and non specific  Attributes it to the liver biopsy procedure but is tolerated well  She has no other complaints  Denies jaundice, fever, chills, OTC or herbal supplements use, weight loss, blood in stool, heartburn  REVIEW OF SYSTEMS IS OTHERWISE NEGATIVE  Historical Information   Past Medical History:   Diagnosis Date   • Anemia     I stopped taking iron supplements after I went through menop   • Disease of thyroid gland    • Hypothyroid    • Sjogren's disease ([facility] [address] )      Past Surgical History:   Procedure Laterality Date   • CARPAL TUNNEL RELEASE     •  SECTION     • COLONOSCOPY     • HYSTERECTOMY     • IR BIOPSY LIVER RANDOM  2023   • LIVER BIOPSY  ?    • OOPHORECTOMY     • SKIN BIOPSY     • TUBAL LIGATION       Social History   Social History     Substance and Sexual Activity   Alcohol Use Yes    Comment: 1 drink social drinker     Social History     Substance and Sexual Activity   Drug Use Never     Social History     Tobacco Use   Smoking Status Former   • Packs/day: 0 25   • Years: 15 00   • Total pack years: 3 75   • Types: Cigarettes   • Start date: 1971   • Quit date: 1979   • Years since quittin 0   Smokeless Tobacco Never   Tobacco Comments    I was never a heavy smoker     Family History   Problem Relation Age of Onset   • Breast cancer Mother         late [de-identified] onset   • Arthritis Mother    • Hypothyroidism Mother    • Vision loss Mother    • Prostate cancer Father         diagnosed in his [de-identified]   • Asthma Father    • Hypothyroidism Father    • Skin cancer Brother    • Skin cancer Brother    • Anemia Maternal Grandmother    • No Known Problems Maternal Grandfather    • No Known Problems Paternal Grandmother    • No Known Problems Paternal Grandfather    • No Known Problems Daughter    • Colon polyps Neg Hx    • Colon cancer Neg Hx        Meds/Allergies       Current Outpatient Medications:   •  CALCIUM-VITAMIN D PO  • "citalopram (CeleXA) 20 mg tablet  •  fexofenadine (ALLEGRA) 180 MG tablet  •  ibuprofen (MOTRIN) 200 mg tablet  •  imiquimod (ALDARA) 5 % cream  •  Multiple Vitamin (multivitamin) tablet  •  NON FORMULARY  •  Omega-3 Fatty Acids (OMEGA-3 FISH OIL PO)  •  PreviDent 5000 Dry Mouth 1 1 % GEL  •  Sodium Fluoride 5000 PPM 1 1 % PSTE  •  Synthroid 112 MCG tablet  •  TURMERIC PO    Allergies   Allergen Reactions   • Other Tongue Swelling     Eggplant - dysphagia   • Shellfish-Derived Products - Food Allergy Swelling           Objective     Blood pressure 124/76, temperature 97 6 °F (36 4 °C), temperature source Tympanic, height 5' 5\" (1 651 m), weight 81 6 kg (180 lb)  Body mass index is 29 95 kg/m²  PHYSICAL EXAM:      General Appearance:   Alert, cooperative, no distress   HEENT:   Normocephalic    Respi:   No labored breathing, able to speak in full sentences   Heart[de-identified]   Normal rate on pulse palpation   Abdomen:   Non distended on exam   Genitalia:   Deferred    Rectal:   Deferred    Extremities:  No cyanosis, clubbing or edema    Pulses:  2+ and symmetric    Skin:  No jaundice   Lymph nodes:  No palpable cervical lymphadenopathy        Lab Results:   No visits with results within 1 Day(s) from this visit     Latest known visit with results is:   Hospital Outpatient Visit on 05/30/2023   Component Date Value   • Sodium 05/30/2023 139    • Potassium 05/30/2023 3 9    • Chloride 05/30/2023 107    • CO2 05/30/2023 27    • ANION GAP 05/30/2023 5    • BUN 05/30/2023 21    • Creatinine 05/30/2023 0 83    • Glucose 05/30/2023 92    • Glucose, Fasting 05/30/2023 92    • Calcium 05/30/2023 8 7    • eGFR 05/30/2023 73    • Protime 05/30/2023 12 4    • INR 05/30/2023 0 86    • WBC 05/30/2023 4 30 (L)    • RBC 05/30/2023 3 99    • Hemoglobin 05/30/2023 11 8    • Hematocrit 05/30/2023 36 4    • MCV 05/30/2023 91    • MCH 05/30/2023 29 6    • MCHC 05/30/2023 32 4    • RDW 05/30/2023 12 4    • Platelets 96/70/4926 177    • MPV " 05/30/2023 9 9    • Case Report 05/30/2023                      Value:Surgical Pathology Report                         Case: [id number]                                   Authorizing Provider:  Cassie Correa MD           Collected:           05/30/2023 0849              Ordering Location:     [address] [place] [address]     Received:            05/30/2023 [address] [facility] Interventional                                                                               Radiology                                                                    Pathologist:           Sandrine Michael MD                                                                 Specimen:    Liver                                                                                     • Addendum 05/30/2023                      Value: This result contains rich text formatting which cannot be displayed here  • Final Diagnosis 05/30/2023                      Value: This result contains rich text formatting which cannot be displayed here  • Microscopic Description 05/30/2023                      Value: This result contains rich text formatting which cannot be displayed here  • Note 05/30/2023                      Value: This result contains rich text formatting which cannot be displayed here  • Additional Information 05/30/2023                      Value: This result contains rich text formatting which cannot be displayed here  • Gross Description 05/30/2023                      Value: This result contains rich text formatting which cannot be displayed here  • Clinical Information 05/30/2023                      Value:elevated LFT         Radiology Results:   IR biopsy liver random    Result Date: 5/30/2023  Narrative: Ultrasound-guided core biopsy of the liver Clinical History: Liver biopsy for elevated liver enzymes    Technique: The patient was brought to the ultrasound area and placed supine on the table   After a brief ultrasound examination was performed of the right hepatic lobe, , a site on the right upper abdominal wall was prepped and draped in usual sterile fashion  Lidocaine was administered to the skin and a 17-gauge coaxial needle was advanced into the right lobe under direct ultrasound guidance  2 18-gauge core biopsies obtained and D-Stat plug through the coaxial needle placed  The patient tolerated the procedure well and suffered no complications  Impression: Impression: Successful core biopsy of right hepatic lobe yielding an adequate specimen   A full report will follow   Workstation performed: [id number]       Gudelia Reynaga MD  Fellow  Gastroenterology  [facility]

## 2023-06-13 ENCOUNTER — TELEPHONE (OUTPATIENT)
Age: 68
End: 2023-06-13

## 2023-06-13 NOTE — TELEPHONE ENCOUNTER
Second phone call to patient to schedule that 2-3 month fu for applying the Fluorouracil cream, LVM for patient to call back and schedule appointment

## 2023-07-12 ENCOUNTER — TELEPHONE (OUTPATIENT)
Age: 68
End: 2023-07-12

## 2023-07-12 NOTE — TELEPHONE ENCOUNTER
Patients GI provider:  Dr. Prince Rodriguez    Number to return call: [phone]    Reason for call: Pt calling because when she went for blood work, she forgot to ask them for the paper prescription back. [facility] so it is not in their system. She is requesting a copy be emailed to her email on the chart so she has it for future visits.     Scheduled procedure/appointment date if applicable: N/A

## 2023-07-24 ENCOUNTER — HOSPITAL ENCOUNTER (OUTPATIENT)
Dept: RADIOLOGY | Facility: HOSPITAL | Age: 68
Discharge: HOME | End: 2023-07-24
Payer: COMMERCIAL

## 2023-07-24 DIAGNOSIS — J18.9 UNRESOLVED PNEUMONIA: ICD-10-CM

## 2023-07-24 PROCEDURE — 71046 X-RAY EXAM CHEST 2 VIEWS: CPT

## 2023-08-10 LAB
ALBUMIN SERPL-MCNC: 3.7 G/DL (ref 3.9–4.9)
ALBUMIN/GLOB SERPL: 1.4 {RATIO} (ref 1.2–2.2)
ALP SERPL-CCNC: 363 IU/L (ref 44–121)
ALT SERPL-CCNC: 93 IU/L (ref 0–32)
AST SERPL-CCNC: 79 IU/L (ref 0–40)
BILIRUB SERPL-MCNC: 0.5 MG/DL (ref 0–1.2)
BUN SERPL-MCNC: 20 MG/DL (ref 8–27)
BUN/CREAT SERPL: 24 (ref 12–28)
CALCIUM SERPL-MCNC: 9.1 MG/DL (ref 8.7–10.3)
CHLORIDE SERPL-SCNC: 105 MMOL/L (ref 96–106)
CO2 SERPL-SCNC: 25 MMOL/L (ref 20–29)
CREAT SERPL-MCNC: 0.85 MG/DL (ref 0.57–1)
EGFR: 76 ML/MIN/1.73
GLOBULIN SER-MCNC: 2.7 G/DL (ref 1.5–4.5)
GLUCOSE SERPL-MCNC: 100 MG/DL (ref 70–99)
POTASSIUM SERPL-SCNC: 4.5 MMOL/L (ref 3.5–5.2)
PROT SERPL-MCNC: 6.4 G/DL (ref 6–8.5)
SODIUM SERPL-SCNC: 140 MMOL/L (ref 134–144)

## 2023-09-01 ENCOUNTER — HOSPITAL ENCOUNTER (OUTPATIENT)
Dept: RADIOLOGY | Facility: HOSPITAL | Age: 68
End: 2023-09-01
Payer: COMMERCIAL

## 2023-09-01 DIAGNOSIS — Z12.31 ENCOUNTER FOR SCREENING MAMMOGRAM FOR MALIGNANT NEOPLASM OF BREAST: ICD-10-CM

## 2023-09-01 DIAGNOSIS — Z13.820 ENCOUNTER FOR SCREENING FOR OSTEOPOROSIS: ICD-10-CM

## 2023-09-01 DIAGNOSIS — M54.12 CERVICAL RADICULITIS: ICD-10-CM

## 2023-09-01 PROCEDURE — 72050 X-RAY EXAM NECK SPINE 4/5VWS: CPT

## 2023-09-28 ENCOUNTER — TELEPHONE (OUTPATIENT)
Dept: GASTROENTEROLOGY | Facility: CLINIC | Age: 68
End: 2023-09-28

## 2023-09-28 NOTE — TELEPHONE ENCOUNTER
----- Message from Merissa Weiner RN sent at 9/22/2023  8:33 AM EDT -----    ----- Message -----  From: Ameena Mckeon MD  Sent: 9/22/2023   8:33 AM EDT  To: [facility]    Hi,    Can you please help the patient set up a follow up appointment with [facility] or [facility] in 3-4 weeks. Thank you.     Kristopher

## 2023-10-04 ENCOUNTER — HOSPITAL ENCOUNTER (OUTPATIENT)
Dept: MAMMOGRAPHY | Facility: CLINIC | Age: 68
Discharge: HOME | End: 2023-10-04
Payer: COMMERCIAL

## 2023-10-04 ENCOUNTER — HOSPITAL ENCOUNTER (OUTPATIENT)
Dept: BONE DENSITY | Facility: CLINIC | Age: 68
Discharge: HOME | End: 2023-10-04
Payer: COMMERCIAL

## 2023-10-04 VITALS — HEIGHT: 65 IN | WEIGHT: 180 LBS | BODY MASS INDEX: 29.99 KG/M2

## 2023-10-04 VITALS — WEIGHT: 178 LBS | BODY MASS INDEX: 30.39 KG/M2 | HEIGHT: 64 IN

## 2023-10-04 DIAGNOSIS — Z13.820 ENCOUNTER FOR SCREENING FOR OSTEOPOROSIS: ICD-10-CM

## 2023-10-04 DIAGNOSIS — Z12.31 ENCOUNTER FOR SCREENING MAMMOGRAM FOR MALIGNANT NEOPLASM OF BREAST: ICD-10-CM

## 2023-10-04 PROCEDURE — 77067 SCR MAMMO BI INCL CAD: CPT

## 2023-10-04 PROCEDURE — 77063 BREAST TOMOSYNTHESIS BI: CPT

## 2023-10-04 PROCEDURE — 77080 DXA BONE DENSITY AXIAL: CPT

## 2023-10-06 ENCOUNTER — EVALUATION (OUTPATIENT)
Dept: PHYSICAL THERAPY | Facility: CLINIC | Age: 68
End: 2023-10-06
Payer: COMMERCIAL

## 2023-10-06 DIAGNOSIS — M54.12 CERVICAL RADICULOPATHY: Primary | ICD-10-CM

## 2023-10-06 PROCEDURE — 97140 MANUAL THERAPY 1/> REGIONS: CPT | Performed by: PHYSICAL THERAPIST

## 2023-10-06 PROCEDURE — 97161 PT EVAL LOW COMPLEX 20 MIN: CPT | Performed by: PHYSICAL THERAPIST

## 2023-10-06 NOTE — PROGRESS NOTES
PT Evaluation     Today's date: 10/6/2023  Patient name: Carmen Huitron  : 1955  MRN: [id number]  Referring provider: Adriano Lerma DO  Dx:   Encounter Diagnosis     ICD-10-CM    1. Cervical radiculopathy  M54.12                      Assessment  Assessment details: Carmen Huitron is a 79 y.o. female presenting to outpatient physical therapy at [facility] with complaints of B arm pain at time lasting up to 90 seconds with some tightness in her Uts that began about 2 months ago without cause. She presents with slightly decreased cervical range of motion, decreased postural strength, limited flexibility, poor postural awareness, decreased tolerance to activity and decreased functional mobility due to Cervical radiculopathy (primary encounter diagnosis) from cervical DDD. She has almost complete relief from pain with postural correction and manual tx. She would benefit from skilled PT services in order to address these deficits and reach maximum level of function. Thank you for the referral!  Impairments: abnormal or restricted ROM, activity intolerance, impaired physical strength, lacks appropriate home exercise program, pain with function and poor posture   Barriers to therapy: None  Understanding of Dx/Px/POC: excellent  Goals  ST. Independent with HEP in 2 weeks  2. Increase cervical AROM to WNL all motions in 3 weeks   3. Good postural awareness in 2 weeks    LT. Achieve FOTO score of 72/100 in 6 weeks   2. Able to complete all tasks without UE pain in 6 weeks  3. Strength B traps = 5/5 in 6 weeks  4.   No UT or cervical paraspinal tightness in 6 weeks    Plan  Patient would benefit from: skilled PT and PT eval  Planned modality interventions: cryotherapy, TENS and thermotherapy: hydrocollator packs  Planned therapy interventions: ADL retraining, flexibility, functional ROM exercises, home exercise program, joint mobilization, manual therapy, neuromuscular re-education, postural training, strengthening, stretching, therapeutic activities and therapeutic exercise  Frequency: 2x week  Duration in weeks: 6  Treatment plan discussed with: patient        Subjective Evaluation    History of Present Illness  Mechanism of injury: Pt reports having having R arm pain for 2 months without cause with L UE pain at times too. Most pain starts in UT and under scapula with radiating pain down either arm with heaviness that lasts no longer than 90 seconds. Works remotely. Cervical x-rays showed DDD C4-7 and lordotic straightening. 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 Has a wrist injury but feels fine while wearing her wrist brace. Lifts her 3 grandchildren often. Recurrent probem    Quality of life: good    Patient Goals  Patient goals for therapy: decreased pain, increased motion, increased strength and return to sport/leisure activities    Pain  Current pain ratin  At best pain ratin  At worst pain ratin  Quality: radiating  Progression: no change    Social Support    Employment status: working  Treatments  No previous or current treatments        Objective     Concurrent Complaints  Negative for disturbed sleep, dizziness and headaches    Static Posture     Head  Forward. Shoulders  Rounded. Postural Observations  Seated posture: poor  Standing posture: fair  Correction of posture: makes symptoms better      Palpation     Additional Palpation Details  Min tightness B pec minor, UT and upper cervical paraspinals. Tenderness   Cervical Spine   Tenderness in the facet joint (C4-5, C5-6, C6-7 B). No tenderness in the spinous process.      Neurological Testing     Sensation   Cervical/Thoracic   Left   Intact: light touch    Right   Intact: light touch    Reflexes   Left   Biceps (C5/C6): normal (2+)    Right   Biceps (C5/C6): normal (2+)    Active Range of Motion   Cervical/Thoracic Spine       Cervical    Flexion:  with pain Restriction level: minimal  Extension:  WFL  Left lateral flexion:  Restriction level: moderate  Right lateral flexion:  Restriction level moderate  Left rotation:  Restriction level: minimal  Right rotation:  Restriction level: minimal    Strength/Myotome Testing     Left Shoulder     Planes of Motion   Flexion: 5   Extension: 5   Abduction: 5     Isolated Muscles   Middle trapezius: 4     Right Shoulder     Planes of Motion   Flexion: 5   Extension: 5   Abduction: 5     Isolated Muscles   Middle trapezius: 4     Left Elbow   Flexion: 5  Extension: 5    Right Elbow   Flexion: 5  Extension: 5    Tests   Cervical   Negative vertical compression. Left Shoulder   Negative ULTT1, ULTT3 and ULTT4. Right Shoulder   Negative ULTT1, ULTT3 and ULTT4. Lumbar   Negative vertical compression.      General Comments:    Upper quarter screen   Shoulder: unremarkable  Neuro Exam:     Headaches   Patient reports headaches: No.         POC EXPIRES On:  11/17/23  PRECAUTIONS:  None  CO-MORBIDITES:  None  PERSONAL FACTORS:  None      Manuals HEP 10/6           Suboccipital release  5'           STM lower cervical paraspinals / UT B  5'                                     Neuro Re-Ed     Wall angels 10/6 15           Supine chin tucks 10/6 5" 10           Retro UBE             TB Rows B             TB LPD B                                       Ther Ex    Doorway pec stretch 10/6 15" 3                                                                                                      Ther Activity                              Gait Training                              Modalities

## 2023-10-09 ENCOUNTER — OFFICE VISIT (OUTPATIENT)
Dept: PHYSICAL THERAPY | Facility: CLINIC | Age: 68
End: 2023-10-09
Payer: COMMERCIAL

## 2023-10-09 DIAGNOSIS — M54.12 CERVICAL RADICULOPATHY: Primary | ICD-10-CM

## 2023-10-09 PROCEDURE — 97112 NEUROMUSCULAR REEDUCATION: CPT | Performed by: PHYSICAL THERAPIST

## 2023-10-09 PROCEDURE — 97140 MANUAL THERAPY 1/> REGIONS: CPT | Performed by: PHYSICAL THERAPIST

## 2023-10-09 PROCEDURE — 97110 THERAPEUTIC EXERCISES: CPT | Performed by: PHYSICAL THERAPIST

## 2023-10-09 NOTE — PROGRESS NOTES
Daily Note     Today's date: 10/9/2023  Patient name: Nisreen Mistry  : 1955  MRN: [id number]  Referring provider: Yuliya Zayas DO  Dx:   Encounter Diagnosis     ICD-10-CM    1. Cervical radiculopathy  M54.12                      Subjective:  Pt reports feeling a little soreness after IE but more aware of her posture. Objective:  See treatment diary below FOTO given (_____)      Assessment:  Pt presented to outpatient physical therapy at [facility] with complaints of B arm pain at time lasting up to 90 seconds with some tightness in her UTs that began about 2 months ago without cause. She presented with slightly decreased cervical range of motion, decreased postural strength, limited flexibility, poor postural awareness, decreased tolerance to activity and decreased functional mobility due to Cervical radiculopathy (primary encounter diagnosis) from cervical DDD. She continues to have almost complete relief from pain with postural correction and manual tx. She will continue to benefit from skilled PT services in order to address these deficits and reach maximum level of function. Good tolerance to all added exercises today. Plan:  Pt away x 2 weeks. Continue to focus on postural strength. Consider prone traps and rows.         POC EXPIRES On:  23  PRECAUTIONS:  None  CO-MORBIDITES:  None  PERSONAL FACTORS:  None      Manuals HEP 10/6 10/9          Suboccipital release  5' 5'          STM lower cervical paraspinals / UT B  5' 15'                                    Neuro Re-Ed     Wall angels 10/6 15 20          Supine chin tucks 10/6 5" 10 5" 15          Retro UBE   L4 4'          TB Rows B   [place] 25          TB LPD B   Blue 25                                    Ther Ex    Doorway pec stretch 10/6 15" 3 15" 5                                                                                                     Ther Activity                              Gait Training Modalities

## 2023-10-24 ENCOUNTER — OFFICE VISIT (OUTPATIENT)
Dept: PHYSICAL THERAPY | Facility: CLINIC | Age: 68
End: 2023-10-24
Payer: COMMERCIAL

## 2023-10-24 DIAGNOSIS — M54.12 CERVICAL RADICULOPATHY: Primary | ICD-10-CM

## 2023-10-24 PROCEDURE — 97112 NEUROMUSCULAR REEDUCATION: CPT

## 2023-10-24 PROCEDURE — 97140 MANUAL THERAPY 1/> REGIONS: CPT

## 2023-10-24 NOTE — PROGRESS NOTES
Daily Note     Today's date: 10/24/2023  Patient name: Judith Sandra  : 1955  MRN: [id number]  Referring provider: Kenyetta Gaitan DO  Dx:   Encounter Diagnosis     ICD-10-CM    1. Cervical radiculopathy  M54.12           Start Time: 08  Stop Time: 820  Total time in clinic (min): 44 minutes    Subjective: Patient states that PT has really helped her neck pain. She notes improved function. Patient states that she has been faithful to HEP. Objective: See treatment diary below      Assessment: Tolerated treatment well. Mod tightness in R>L c-s paraspinals. Improved muscle tone post manuals. Patient demonstrated fatigue post treatment and would benefit from continued PT to improve c-s mobility and strength. Plan: Continue per plan of care.       POC EXPIRES On:  23  PRECAUTIONS:  None  CO-MORBIDITES:  None  PERSONAL FACTORS:  None      Manuals HEP 10/6 10/9 10/24         Suboccipital release  5' 5' 5'           STM lower cervical paraspinals / UT B  5' 15' 15'                                   Neuro Re-Ed     Wall angels 10/6 15 20 20x5"         Supine chin tucks 10/6 5" 10 5" 15 5" 15         Retro UBE   L4 4' L4 4'         TB Rows B   [place] 25 Plum 25         TB LPD B   Blue 25 Blue 25                                   Ther Ex    Doorway pec stretch 10/6 15" 3 15" 5 15" 5                                                                                                    Ther Activity                              Gait Training                              Modalities

## 2023-10-25 ENCOUNTER — OFFICE VISIT (OUTPATIENT)
Dept: GASTROENTEROLOGY | Facility: CLINIC | Age: 68
End: 2023-10-25
Payer: COMMERCIAL

## 2023-10-25 VITALS
BODY MASS INDEX: 30.32 KG/M2 | TEMPERATURE: 96.6 F | DIASTOLIC BLOOD PRESSURE: 90 MMHG | WEIGHT: 182 LBS | SYSTOLIC BLOOD PRESSURE: 140 MMHG | HEART RATE: 64 BPM | HEIGHT: 65 IN

## 2023-10-25 DIAGNOSIS — K75.81 STEATOHEPATITIS, NON-ALCOHOLIC: ICD-10-CM

## 2023-10-25 DIAGNOSIS — R79.89 ELEVATED LFTS: Primary | ICD-10-CM

## 2023-10-25 DIAGNOSIS — R74.8 ELEVATED ALKALINE PHOSPHATASE LEVEL: ICD-10-CM

## 2023-10-25 DIAGNOSIS — R74.8 ELEVATED LIVER ENZYMES: ICD-10-CM

## 2023-10-25 PROCEDURE — 99214 OFFICE O/P EST MOD 30 MIN: CPT | Performed by: INTERNAL MEDICINE

## 2023-10-25 NOTE — PROGRESS NOTES
[facility] - Outpatient Follow-up Note  Katie Stein 79 y.o. female MRN: [id number]  Encounter: [id number]          ASSESSMENT AND PLAN:      Persistently elevated cholestatic liver labs with most recent alkaline phosphatase 367, AST 70, ALT 83. We have been following these labs as they slowly trending down, however they appear to have katlin'd. 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 We discussed serially following her labs and if they stop trending downwards, considering initiating steroids plus or minus ursodiol for possible overlap syndrome. Given the lack of recent abdominal imaging, we will proceed with an MRI MRCP first and if MRI MRCP is not concerning for biliary obstruction or PSC type picture, and her LFTs do not improve on this next blood draw, we will trial steroids and ursodiol. I will also check a TPMT activity in anticipation of using Imuran as a steroid sparing agent. Continue monthly blood work. FOLLOW-UP:  Return in about 3 months (around 1/25/2024). VISIT DIAGNOSES AND ORDERS:      1. Elevated LFTs    2. Elevated liver enzymes    3. Steatohepatitis, non-alcoholic    4. Elevated alkaline phosphatase level      Orders Placed This Encounter   Procedures    MRI abdomen w wo contrast and mrcp    Comprehensive metabolic panel    CBC and differential    Protime-INR    Gamma GT    IgG, IgA, IgM    Comprehensive metabolic panel     ______________________________________________________________________    SUBJECTIVE:       Interval update 10/25/2023:  Since her last office visit, was briefly hospitalized in [place] with pneumonia. At that time, she was also noted to be hyperthyroid and her levothyroxine doses were decreased. Otherwise, her health has been relatively stable. Her main complaints remain fatigue.   Otherwise, Ms. Edvin Mckeon denies recent or history of yellow eyes/skin, dark urine, GI bleeding, abdominal distention with fluid, lower extremity swelling, easy bruising, excessive bleeding, pruritus or confusion. She does complain of mildly increased dry skin. She denies abdominal pain, nausea, vomiting, heartburn, reflux, difficulty swallowing, early satiety, bloating, diarrhea, constipation or straining with passing stools. She had blood work done twice since her last office visit, showing persistently elevated LFTs are a slow trend downward. Recent labs were from 10/6/2023 with alk phos 367, AST 70, ALT 83 and total bilirubin 0.4. History:    Summary from office visit 6/6/2023  1. Elevated LFTs  2. Positive AMA   Hepatocellular. Chronic since age 47s. Remote hx of syphillis in the past; had undergone liver bx ~20 years ago which was indeterminate. Was referred to hepatology in May 2023 for AMA +ve, ASMA -ve, elevated AST/ALT. Underwent liver bx 5/23/23 showing patchy portal and interface hepatitis and patchy danielito-portal fibrosis. Repeat LFTs showing down trending AST and ALT. - discussed in detail that the given non definitve biopsy results and down trending LFTs, moving forward we have two options, one is to initiate immunosuppressive therapy for AIH or to continue to monitor LFTs serially qmonthly for 3 months;  - she prefers serial monitoring for now which is reasonable  - discussed that if LFTs do not improve or worsen, will recommend initiating treatment    HPI:  53-year-old female with past medical history including but not limited to Sjogren syndrome with Raynaud's phenomenon, Hashimoto's thyroiditis on Synthroid, remote history of syphilis status posttreatment who presents today for follow up for chronic elevation in LFTs. Since last visit in May 2023, patient underwent liver biopsy for elevated AST/ALT, positive LYNSEY, positive AMA blood work.  She underwent liver biopsy 5/30/23 which showed patchy portal and interface hepatitis and patchy danielito-portal fibrosis without evidence of cholestasis. She had repeat blood work obtained 2023 showing AST 51, ALT 52,  which is down trending from 2022, ASMA negative, antimitochondrial antibody 143. During this visit, she reports feeling well overall. Reports occsaional RUQ abdominal pain which is non-distressing and non specific. Attributes it to the liver biopsy procedure but is tolerated well. She has no other complaints. Denies jaundice, fever, chills, OTC or herbal supplements use, weight loss, blood in stool, heartburn. REVIEW OF SYSTEMS     Review of Systems   All other systems reviewed and are negative.       Historical Information   Patient Active Problem List   Diagnosis    Elevated liver enzymes    Hypothyroid    Sjogren's disease ([address] )     Social History     Substance and Sexual Activity   Alcohol Use Yes    Comment: 1 drink social drinker     Social History     Substance and Sexual Activity   Drug Use Never     Social History     Tobacco Use   Smoking Status Former    Packs/day: 0.25    Years: 15.00    Total pack years: 3.75    Types: Cigarettes    Start date: 1971    Quit date: 1979    Years since quittin.4   Smokeless Tobacco Never   Tobacco Comments    I was never a heavy smoker       Meds/Allergies       Current Outpatient Medications:     CALCIUM-VITAMIN D PO    citalopram (CeleXA) 20 mg tablet    fexofenadine (ALLEGRA) 180 MG tablet    ibuprofen (MOTRIN) 200 mg tablet    imiquimod (ALDARA) 5 % cream    Multiple Vitamin (multivitamin) tablet    NON FORMULARY    Omega-3 Fatty Acids (OMEGA-3 FISH OIL PO)    PreviDent 5000 Dry Mouth 1.1 % GEL    Sodium Fluoride 5000 PPM 1.1 % PSTE    Synthroid 112 MCG tablet    TURMERIC PO    Allergies   Allergen Reactions    Other Tongue Swelling     Eggplant - dysphagia    Shellfish-Derived Products - Food Allergy Swelling           Objective     Blood pressure 140/90, pulse 64, temperature (!) 96.6 °F (35.9 °C), temperature source Tympanic, height 5' 4.75" (1.645 m), weight 82.6 kg (182 lb). Body mass index is 30.52 kg/m². PHYSICAL EXAM:      Physical Exam  Vitals reviewed. Constitutional:       General: She is not in acute distress. Appearance: Normal appearance. She is not ill-appearing. HENT:      Head: Normocephalic and atraumatic. Nose: Nose normal.      Mouth/Throat:      Mouth: Mucous membranes are moist.      Pharynx: Oropharynx is clear. Eyes:      General: No scleral icterus. Extraocular Movements: Extraocular movements intact. Cardiovascular:      Rate and Rhythm: Normal rate and regular rhythm. Heart sounds: No murmur heard. Pulmonary:      Effort: Pulmonary effort is normal. No respiratory distress. Breath sounds: Normal breath sounds. Abdominal:      General: Abdomen is flat. Palpations: Abdomen is soft. There is no shifting dullness, fluid wave, hepatomegaly or splenomegaly. Tenderness: There is no abdominal tenderness. Hernia: No hernia is present. Genitourinary:     Comments: deferred  Musculoskeletal:         General: No swelling. Normal range of motion. Cervical back: Normal range of motion and neck supple. No tenderness. Skin:     General: Skin is warm. Coloration: Skin is not jaundiced. Findings: No bruising or rash. Neurological:      General: No focal deficit present. Mental Status: She is alert and oriented to person, place, and time.    Psychiatric:         Mood and Affect: Mood normal.         Lab Results:   Lab Results   Component Value Date     02/12/2014    K 4.5 08/09/2023    CO2 25 08/09/2023     08/09/2023    BUN 20 08/09/2023    CREATININE 0.85 08/09/2023    GLUCOSE 90 02/12/2014     Lab Results   Component Value Date    WBC 4.30 (L) 05/30/2023    HGB 11.8 05/30/2023    HCT 36.4 05/30/2023    MCV 91 05/30/2023     05/30/2023     Lab Results   Component Value Date    TP 6.4 08/09/2023 AST 79 (H) 08/09/2023    ALT 93 (H) 08/09/2023    BILITOT 0.4 02/12/2014    INR 0.86 05/30/2023      Lab Results   Component Value Date    IRON 45 (L) 03/04/2014    LABIRON 16 03/04/2014    FERRITIN 332.4 (H) 03/04/2014     No results found for: "CHOL", "HDL", "TRIG", "LDL"      Radiology Results:   Mammo screening bilateral w 3d & cad    Result Date: 10/5/2023  Narrative: DIAGNOSIS: Encounter for screening mammogram for malignant neoplasm of breast TECHNIQUE: Digital screening mammography was performed. Computer Aided Detection (CAD) analyzed all applicable images. COMPARISONS: Prior breast imaging dated: 07/01/2021, 10/25/2018, 07/12/2016, 08/21/2014, 02/15/2013, 10/04/2010, and 12/28/2004 RELEVANT HISTORY: Family Breast Cancer History: History of breast cancer in Mother. Family Medical History: Family medical history includes breast cancer in mother. Personal History: Hormone history includes birth control. Surgical history includes hysterectomy and oophorectomy. No known relevant medical history. The patient is scheduled in a reminder system for screening mammography. 8-10% of cancers will be missed on mammography. Management of a palpable abnormality must be based on clinical grounds. Patients will be notified of their results via letter from our facility. Accredited by [employer] of Radiology and FDA. RISK ASSESSMENT: 5 Year Tyrer-Cuzick: 3.78 % 10 Year Tyrer-Cuzick: 7.09 % Lifetime Tyrer-Cuzick: 13.86 % TISSUE DENSITY: There are scattered areas of fibroglandular density. INDICATION: Lee Lomeli is a 79 y.o. female presenting for screening mammography. FINDINGS: There are no suspicious masses, grouped microcalcifications or areas of architectural distortion. The skin and nipple areolar complex are unremarkable. Impression: No mammographic evidence of malignancy.  ASSESSMENT/BI-RADS CATEGORY: Left: 1 - Negative Right: 1 - Negative Overall: 1 - Negative RECOMMENDATION:      - Routine screening mammogram in 1 year for both breasts. Workstation ID: [id number]     DXA bone density spine hip and pelvis    Result Date: 10/5/2023  Narrative: DXA SCAN CLINICAL HISTORY: 68-year-old postmenopausal female. OTHER RISK FACTORS: SSRI therapy. PHARMACOLOGIC THERAPY FOR OSTEOPOROSIS:  None. TECHNIQUE: Bone densitometry was performed using a Hologic Discovery C bone densitometer. Regions of interest appear properly placed. COMPARISON: 7/1/2021. RESULTS: LUMBAR SPINE Level: L1-L4: BMD: 0.907 gm/cm2 T-score: -1.3 LEFT  TOTAL HIP: BMD: 0.808 gm/cm2 T-score: -1.1 LEFT FEMORAL NECK: BMD: 0.710 gm/cm2 T score: -1.3     Impression: 1. Low bone mass (osteopenia). 2.  Since a DXA study from 7/1/2021, there has been: A  STATISTICALLY SIGNIFICANT INCREASE in bone mineral density of 0.040 g/cm2 (4.6%) in the lumbar spine. 3.  The 10 year risk of hip fracture is 0.7% with the 10 year risk of major osteoporotic fracture being 8.2% as calculated by the [facility] fracture risk assessment tool (FRAX, which is based on data generated by the [facility] for Metabolic Bone Diseases). 4.  The current NOF guidelines recommend treating patients with a T-score of -2.5 or less in the lumbar spine or hips, or in post-menopausal women and men over the age of 48 with low bone mass (osteopenia) and a FRAX 10 year risk score of >3% for hip fracture and/or >20% for major osteoporotic fracture. 5.  The NOF recommends follow-up DXA in 1-2 years after initiating therapy for osteoporosis and every 2 years thereafter. More frequent evaluation is appropriate for patients with conditions associated with rapid bone loss, such as glucocorticoid therapy. The interval between DXA screenings may be longer for individuals without major risk factors and initial T-score in the normal or upper low bone mass range.  The FRAX algorithm has certain limitations: -FRAX has not been validated in patients currently or previously treated with pharmacotherapy for osteoporosis. In such patients, clinical judgment must be exercised in interpreting FRAX scores. -Prior hip, vertebral and humeral fragility fractures appear to confer greater risk of subsequent fracture than fractures at other sites (this is especially true for individuals with severe vertebral fractures), but quantification of this incremental risk is not possible with FRAX. -FRAX underestimates fracture risk in patients with history of multiple fragility fractures. -FRAX may underestimate fracture risk in patients with history of frequent falls. -It is not appropriate to use FRAX to monitor treatment response. WHO CLASSIFICATION: Normal (a T-score of -1.0 or higher) Low bone mineral density (a T-score of less than -1.0 but higher than -2.5) Osteoporosis (a T-score of -2.5 or less) Severe osteoporosis (a T-score of -2.5 or less with a fragility fracture) LEAST SIGNIFICANT CHANGE (AT 95% C. I): Lumbar spine: 0.039 g/cm2; 4.0% Total hip: 0.031 g/cm2; 3.7% Forearm: 0.022 g/cm2; 3.8% Workstation performed: [id number]         Mayi Spann MD

## 2023-10-25 NOTE — PATIENT INSTRUCTIONS
Please have blood work updated at your earliest convenience. Schedule an MRI/MRCP at your earliest convenience. If your liver tests remain elevated and there is no clear cause for it noted on MRI, we will proceed with a trial of prednisone. Blood work monthly. MRI scheduled on 11/11 at [address]

## 2023-11-02 ENCOUNTER — APPOINTMENT (OUTPATIENT)
Dept: PHYSICAL THERAPY | Facility: CLINIC | Age: 68
End: 2023-11-02
Payer: COMMERCIAL

## 2023-11-03 ENCOUNTER — APPOINTMENT (OUTPATIENT)
Dept: PHYSICAL THERAPY | Facility: CLINIC | Age: 68
End: 2023-11-03
Payer: COMMERCIAL

## 2023-11-03 ENCOUNTER — TRANSCRIBE ORDERS (OUTPATIENT)
Dept: GASTROENTEROLOGY | Facility: CLINIC | Age: 68
End: 2023-11-03

## 2023-11-06 ENCOUNTER — OFFICE VISIT (OUTPATIENT)
Dept: URGENT CARE | Facility: CLINIC | Age: 68
End: 2023-11-06
Payer: COMMERCIAL

## 2023-11-06 ENCOUNTER — HOSPITAL ENCOUNTER (EMERGENCY)
Facility: HOSPITAL | Age: 68
Discharge: LEFT BEFORE DISCHARGE | End: 2023-11-06
Payer: COMMERCIAL

## 2023-11-06 VITALS
HEIGHT: 65 IN | RESPIRATION RATE: 18 BRPM | TEMPERATURE: 98.6 F | SYSTOLIC BLOOD PRESSURE: 157 MMHG | WEIGHT: 178 LBS | DIASTOLIC BLOOD PRESSURE: 72 MMHG | OXYGEN SATURATION: 98 % | HEART RATE: 96 BPM | BODY MASS INDEX: 29.66 KG/M2

## 2023-11-06 VITALS
TEMPERATURE: 97.8 F | OXYGEN SATURATION: 98 % | DIASTOLIC BLOOD PRESSURE: 80 MMHG | HEART RATE: 77 BPM | RESPIRATION RATE: 18 BRPM | SYSTOLIC BLOOD PRESSURE: 154 MMHG

## 2023-11-06 DIAGNOSIS — R68.83 CHILLS (WITHOUT FEVER): Primary | ICD-10-CM

## 2023-11-06 DIAGNOSIS — R10.9 ABDOMINAL PAIN, UNSPECIFIED ABDOMINAL LOCATION: ICD-10-CM

## 2023-11-06 LAB
ALBUMIN SERPL BCP-MCNC: 3.9 G/DL (ref 3.5–5)
ALP SERPL-CCNC: 281 U/L (ref 34–104)
ALT SERPL W P-5'-P-CCNC: 58 U/L (ref 7–52)
ANION GAP SERPL CALCULATED.3IONS-SCNC: 6 MMOL/L
AST SERPL W P-5'-P-CCNC: 52 U/L (ref 13–39)
BACTERIA UR QL AUTO: ABNORMAL /HPF
BASOPHILS # BLD AUTO: 0.01 THOUSANDS/ÂΜL (ref 0–0.1)
BASOPHILS NFR BLD AUTO: 0 % (ref 0–1)
BILIRUB SERPL-MCNC: 0.57 MG/DL (ref 0.2–1)
BILIRUB UR QL STRIP: NEGATIVE
BUN SERPL-MCNC: 19 MG/DL (ref 5–25)
CALCIUM SERPL-MCNC: 8.5 MG/DL (ref 8.4–10.2)
CHLORIDE SERPL-SCNC: 102 MMOL/L (ref 96–108)
CLARITY UR: CLEAR
CO2 SERPL-SCNC: 26 MMOL/L (ref 21–32)
COLOR UR: ABNORMAL
CREAT SERPL-MCNC: 0.96 MG/DL (ref 0.6–1.3)
EOSINOPHIL # BLD AUTO: 0 THOUSAND/ÂΜL (ref 0–0.61)
EOSINOPHIL NFR BLD AUTO: 0 % (ref 0–6)
ERYTHROCYTE [DISTWIDTH] IN BLOOD BY AUTOMATED COUNT: 13 % (ref 11.6–15.1)
GFR SERPL CREATININE-BSD FRML MDRD: 61 ML/MIN/1.73SQ M
GLUCOSE SERPL-MCNC: 152 MG/DL (ref 65–140)
GLUCOSE UR STRIP-MCNC: ABNORMAL MG/DL
HCT VFR BLD AUTO: 35.4 % (ref 34.8–46.1)
HGB BLD-MCNC: 11.4 G/DL (ref 11.5–15.4)
HGB UR QL STRIP.AUTO: NEGATIVE
HYALINE CASTS #/AREA URNS LPF: ABNORMAL /LPF
IMM GRANULOCYTES # BLD AUTO: 0 THOUSAND/UL (ref 0–0.2)
IMM GRANULOCYTES NFR BLD AUTO: 0 % (ref 0–2)
KETONES UR STRIP-MCNC: ABNORMAL MG/DL
LEUKOCYTE ESTERASE UR QL STRIP: NEGATIVE
LIPASE SERPL-CCNC: 23 U/L (ref 11–82)
LYMPHOCYTES # BLD AUTO: 0.78 THOUSANDS/ÂΜL (ref 0.6–4.47)
LYMPHOCYTES NFR BLD AUTO: 26 % (ref 14–44)
MCH RBC QN AUTO: 28.9 PG (ref 26.8–34.3)
MCHC RBC AUTO-ENTMCNC: 32.2 G/DL (ref 31.4–37.4)
MCV RBC AUTO: 90 FL (ref 82–98)
MONOCYTES # BLD AUTO: 0.3 THOUSAND/ÂΜL (ref 0.17–1.22)
MONOCYTES NFR BLD AUTO: 10 % (ref 4–12)
MUCOUS THREADS UR QL AUTO: ABNORMAL
NEUTROPHILS # BLD AUTO: 1.94 THOUSANDS/ÂΜL (ref 1.85–7.62)
NEUTS SEG NFR BLD AUTO: 64 % (ref 43–75)
NITRITE UR QL STRIP: NEGATIVE
NON-SQ EPI CELLS URNS QL MICRO: ABNORMAL /HPF
NRBC BLD AUTO-RTO: 0 /100 WBCS
PH UR STRIP.AUTO: 5.5 [PH]
PLATELET # BLD AUTO: 123 THOUSANDS/UL (ref 149–390)
PMV BLD AUTO: 10.3 FL (ref 8.9–12.7)
POTASSIUM SERPL-SCNC: 3.7 MMOL/L (ref 3.5–5.3)
PROT SERPL-MCNC: 7.1 G/DL (ref 6.4–8.4)
PROT UR STRIP-MCNC: ABNORMAL MG/DL
RBC # BLD AUTO: 3.94 MILLION/UL (ref 3.81–5.12)
RBC #/AREA URNS AUTO: ABNORMAL /HPF
SARS-COV-2 AG UPPER RESP QL IA: NEGATIVE
SODIUM SERPL-SCNC: 134 MMOL/L (ref 135–147)
SP GR UR STRIP.AUTO: >=1.03 (ref 1–1.03)
UROBILINOGEN UR STRIP-ACNC: <2 MG/DL
VALID CONTROL: NORMAL
WBC # BLD AUTO: 3.03 THOUSAND/UL (ref 4.31–10.16)
WBC #/AREA URNS AUTO: ABNORMAL /HPF

## 2023-11-06 PROCEDURE — 81001 URINALYSIS AUTO W/SCOPE: CPT

## 2023-11-06 PROCEDURE — 80053 COMPREHEN METABOLIC PANEL: CPT

## 2023-11-06 PROCEDURE — 83690 ASSAY OF LIPASE: CPT

## 2023-11-06 PROCEDURE — 99213 OFFICE O/P EST LOW 20 MIN: CPT | Performed by: PHYSICIAN ASSISTANT

## 2023-11-06 PROCEDURE — 87811 SARS-COV-2 COVID19 W/OPTIC: CPT | Performed by: PHYSICIAN ASSISTANT

## 2023-11-06 PROCEDURE — 85025 COMPLETE CBC W/AUTO DIFF WBC: CPT

## 2023-11-06 PROCEDURE — 36415 COLL VENOUS BLD VENIPUNCTURE: CPT

## 2023-11-06 RX ORDER — ALBUTEROL SULFATE 90 UG/1
2 AEROSOL, METERED RESPIRATORY (INHALATION) EVERY 6 HOURS PRN
COMMUNITY
Start: 2023-06-24

## 2023-11-06 NOTE — PROGRESS NOTES
[facility]        NAME: Tatiana Perez is a 79 y.o. female  : 1955    MRN: [id number]  DATE: 2023  TIME: 4:29 PM    Assessment and Plan   Chills (without fever) [R68.83]  1. Chills (without fever)  Poct Covid 19 Rapid Antigen Test    Transfer to other facility      2. Abdominal pain, unspecified abdominal location              Patient Instructions       Follow up with PCP in 3-5 days. Proceed to  ER if symptoms worsen. Chief Complaint     Chief Complaint   Patient presents with    Nausea     Patient started on a couple days ago with nausea, fatigue, and chills. Patient states that she took IBU. Patient states that she feel like her stomach is on fire. History of Present Illness       Patient is here today reporting burning in stomach for 1 day. Patient reports chills since 23. Patient reports she can't eat or drink without stomach pain. Allergies were reviewed in chart. Admits seasonal allergies. Denies any history of diabetes. Patient reports the last time she had chills like this it was pneumonia. Nausea  Associated symptoms include abdominal pain and nausea. Review of Systems   Review of Systems   Constitutional:  Positive for appetite change. Respiratory: Negative. Cardiovascular: Negative. Gastrointestinal:  Positive for abdominal pain and nausea. Neurological: Negative. Psychiatric/Behavioral: Negative.            Current Medications       Current Outpatient Medications:     albuterol (PROVENTIL HFA,VENTOLIN HFA) 90 mcg/act inhaler, Inhale 2 puffs every 6 (six) hours as needed, Disp: , Rfl:     CALCIUM-VITAMIN D PO, Take by mouth, Disp: , Rfl:     fexofenadine (ALLEGRA) 180 MG tablet, Take 180 mg by mouth if needed, Disp: , Rfl:     ibuprofen (MOTRIN) 200 mg tablet, Take 400 mg by mouth every 6 (six) hours as needed for mild pain, Disp: , Rfl:     Multiple Vitamin (multivitamin) tablet, Take 1 tablet by mouth daily, Disp: , Rfl: NON FORMULARY, Garlacin, Disp: , Rfl:     Omega-3 Fatty Acids (OMEGA-3 FISH OIL PO), Take by mouth, Disp: , Rfl:     PreviDent 5000 Dry Mouth 1.1 % GEL, , Disp: , Rfl:     Sodium Fluoride 5000 PPM 1.1 % PSTE, USE 1-2 TIMES AS INSTRUCTED, Disp: , Rfl:     Synthroid 112 MCG tablet, Take 75 mcg by mouth daily, Disp: , Rfl:     TURMERIC PO, Take by mouth, Disp: , Rfl:     citalopram (CeleXA) 20 mg tablet, , Disp: , Rfl:     imiquimod (ALDARA) 5 % cream, Apply sparingly at bedtime 5 nights per week for 6 weeks to affected area of right arm. (Patient not taking: Reported on 2023), Disp: 30 each, Rfl: 0    Current Allergies     Allergies as of 2023 - Reviewed 2023   Allergen Reaction Noted    Other Tongue Swelling 2022    Shellfish-derived products - food allergy Swelling 2021            The following portions of the patient's history were reviewed and updated as appropriate: allergies, current medications, past family history, past medical history, past social history, past surgical history and problem list.     Past Medical History:   Diagnosis Date    Anemia     I stopped taking iron supplements after I went through menop    Disease of thyroid gland     Hypothyroid     Sjogren's disease ([address] [facility])        Past Surgical History:   Procedure Laterality Date    CARPAL TUNNEL RELEASE       SECTION      COLONOSCOPY      HYSTERECTOMY       approx    IR BIOPSY LIVER RANDOM  2023    LIVER BIOPSY  2017?     OOPHORECTOMY Bilateral     approx     SKIN BIOPSY      TUBAL LIGATION         Family History   Problem Relation Age of Onset    Breast cancer Mother         late 80s onset    Arthritis Mother     Hypothyroidism Mother     Vision loss Mother     Prostate cancer Father         diagnosed in his 80s    Asthma Father     Hypothyroidism Father     No Known Problems Daughter     Anemia Maternal Grandmother     No Known Problems Maternal Grandfather     No Known Problems Paternal Grandmother     No Known Problems Paternal Grandfather     Skin cancer Brother     Prostate cancer Brother 76    Colon polyps Neg Hx     Colon cancer Neg Hx          Medications have been verified. Objective   /72   Pulse 96   Temp 98.6 °F (37 °C) (Tympanic)   Resp 18   Ht 5' 5" (1.651 m)   Wt 80.7 kg (178 lb)   SpO2 98%   BMI 29.62 kg/m²   No LMP recorded. Patient has had a hysterectomy. Physical Exam     Physical Exam  Vitals and nursing note reviewed. Constitutional:       Appearance: Normal appearance. HENT:      Head: Normocephalic. Right Ear: Tympanic membrane, ear canal and external ear normal.      Left Ear: Tympanic membrane, ear canal and external ear normal.      Mouth/Throat:      Mouth: Mucous membranes are moist.   Eyes:      Extraocular Movements: Extraocular movements intact. Pupils: Pupils are equal, round, and reactive to light. Cardiovascular:      Rate and Rhythm: Normal rate and regular rhythm. Heart sounds: Normal heart sounds. Pulmonary:      Breath sounds: Normal breath sounds. Abdominal:      Palpations: Abdomen is soft. Neurological:      General: No focal deficit present. Mental Status: She is alert and oriented to person, place, and time.    Psychiatric:         Behavior: Behavior normal.

## 2023-11-07 ENCOUNTER — OFFICE VISIT (OUTPATIENT)
Dept: PHYSICAL THERAPY | Facility: CLINIC | Age: 68
End: 2023-11-07
Payer: COMMERCIAL

## 2023-11-07 DIAGNOSIS — M54.12 CERVICAL RADICULOPATHY: Primary | ICD-10-CM

## 2023-11-07 PROCEDURE — 97112 NEUROMUSCULAR REEDUCATION: CPT

## 2023-11-07 PROCEDURE — 97110 THERAPEUTIC EXERCISES: CPT

## 2023-11-07 PROCEDURE — 97140 MANUAL THERAPY 1/> REGIONS: CPT

## 2023-11-07 NOTE — PROGRESS NOTES
Daily Note     Today's date: 2023  Patient name: Candice Sierra  : 1955  MRN: [id number]  Referring provider: Angelo Nunez DO  Dx:   Encounter Diagnosis     ICD-10-CM    1. Cervical radiculopathy  M54.12                      Subjective: Patient report going to the ER due to a stomach ache, feels very tight and sore. Objective: See treatment diary below      Assessment: Pt tolerated below TE well, responded to manuals very well. Plan: Continue per plan of care.       POC EXPIRES On:  23  PRECAUTIONS:  None  CO-MORBIDITES:  None  PERSONAL FACTORS:  None      Manuals HEP 10/6 10/9 10/24 11/7        Suboccipital release  5' 5' 5'   5        STM lower cervical paraspinals / UT B  5' 15' 15' 15                                  Neuro Re-Ed     Wall angels 10/6 15 20 20x5" 20x 5"        Supine chin tucks 10/6 5" 10 5" 15 5" 15 5" 15        Retro UBE   L4 4' L4 4' L4 5'        TB Rows B   Plum 25 Plum 25 Plum 25        TB LPD B   Blue 25 Blue 25 Blue 25                                  Ther Ex    Doorway pec stretch 10/6 15" 3 15" 5 15" 5 15" 5                                                                                                   Ther Activity                              Gait Training                              Modalities

## 2023-11-08 LAB
ALBUMIN SERPL-MCNC: 3.9 G/DL (ref 3.9–4.9)
ALBUMIN/GLOB SERPL: 1.4 {RATIO} (ref 1.2–2.2)
ALP SERPL-CCNC: 307 IU/L (ref 44–121)
ALT SERPL-CCNC: 50 IU/L (ref 0–32)
AST SERPL-CCNC: 47 IU/L (ref 0–40)
BASOPHILS # BLD AUTO: 0 X10E3/UL (ref 0–0.2)
BASOPHILS NFR BLD AUTO: 0 %
BILIRUB SERPL-MCNC: 0.4 MG/DL (ref 0–1.2)
BUN SERPL-MCNC: 16 MG/DL (ref 8–27)
BUN/CREAT SERPL: 19 (ref 12–28)
CALCIUM SERPL-MCNC: 8.6 MG/DL (ref 8.7–10.3)
CHLORIDE SERPL-SCNC: 105 MMOL/L (ref 96–106)
CO2 SERPL-SCNC: 21 MMOL/L (ref 20–29)
CREAT SERPL-MCNC: 0.84 MG/DL (ref 0.57–1)
EGFR: 77 ML/MIN/1.73
EOSINOPHIL # BLD AUTO: 0 X10E3/UL (ref 0–0.4)
EOSINOPHIL NFR BLD AUTO: 2 %
ERYTHROCYTE [DISTWIDTH] IN BLOOD BY AUTOMATED COUNT: 13.1 % (ref 11.7–15.4)
GGT SERPL-CCNC: 231 IU/L (ref 0–60)
GLOBULIN SER-MCNC: 2.8 G/DL (ref 1.5–4.5)
GLUCOSE SERPL-MCNC: 106 MG/DL (ref 70–99)
HCT VFR BLD AUTO: 34.5 % (ref 34–46.6)
HGB BLD-MCNC: 12.1 G/DL (ref 11.1–15.9)
IGA SERPL-MCNC: 139 MG/DL (ref 87–352)
IGG SERPL-MCNC: 1398 MG/DL (ref 586–1602)
IGM SERPL-MCNC: 352 MG/DL (ref 26–217)
IMM GRANULOCYTES # BLD: 0 X10E3/UL (ref 0–0.1)
IMM GRANULOCYTES NFR BLD: 0 %
INR PPP: 0.9 (ref 0.9–1.2)
LYMPHOCYTES # BLD AUTO: 1.4 X10E3/UL (ref 0.7–3.1)
LYMPHOCYTES NFR BLD AUTO: 49 %
MCH RBC QN AUTO: 31.5 PG (ref 26.6–33)
MCHC RBC AUTO-ENTMCNC: 35.1 G/DL (ref 31.5–35.7)
MCV RBC AUTO: 90 FL (ref 79–97)
MONOCYTES # BLD AUTO: 0.3 X10E3/UL (ref 0.1–0.9)
MONOCYTES NFR BLD AUTO: 10 %
NEUTROPHILS # BLD AUTO: 1.1 X10E3/UL (ref 1.4–7)
NEUTROPHILS NFR BLD AUTO: 39 %
PLATELET # BLD AUTO: 111 X10E3/UL (ref 150–450)
POTASSIUM SERPL-SCNC: 3.8 MMOL/L (ref 3.5–5.2)
PROT SERPL-MCNC: 6.7 G/DL (ref 6–8.5)
PROTHROMBIN TIME: 10.1 SEC (ref 9.1–12)
RBC # BLD AUTO: 3.84 X10E6/UL (ref 3.77–5.28)
SODIUM SERPL-SCNC: 141 MMOL/L (ref 134–144)
WBC # BLD AUTO: 2.7 X10E3/UL (ref 3.4–10.8)

## 2023-11-11 ENCOUNTER — HOSPITAL ENCOUNTER (OUTPATIENT)
Dept: MRI IMAGING | Facility: HOSPITAL | Age: 68
Discharge: HOME | End: 2023-11-11
Attending: INTERNAL MEDICINE
Payer: COMMERCIAL

## 2023-11-11 DIAGNOSIS — R74.8 ELEVATED LIVER ENZYMES: ICD-10-CM

## 2023-11-11 DIAGNOSIS — R79.89 ELEVATED LFTS: ICD-10-CM

## 2023-11-11 DIAGNOSIS — R74.8 ELEVATED ALKALINE PHOSPHATASE LEVEL: ICD-10-CM

## 2023-11-11 DIAGNOSIS — K75.81 STEATOHEPATITIS, NON-ALCOHOLIC: ICD-10-CM

## 2023-11-11 PROCEDURE — G1004 CDSM NDSC: HCPCS

## 2023-11-11 PROCEDURE — 74183 MRI ABD W/O CNTR FLWD CNTR: CPT

## 2023-11-11 PROCEDURE — A9585 GADOBUTROL INJECTION: HCPCS | Performed by: INTERNAL MEDICINE

## 2023-11-11 RX ORDER — GADOBUTROL 604.72 MG/ML
8 INJECTION INTRAVENOUS
Status: COMPLETED | OUTPATIENT
Start: 2023-11-11 | End: 2023-11-11

## 2023-11-11 RX ADMIN — GADOBUTROL 8 ML: 604.72 INJECTION INTRAVENOUS at 17:09

## 2023-11-16 ENCOUNTER — OFFICE VISIT (OUTPATIENT)
Dept: PHYSICAL THERAPY | Facility: CLINIC | Age: 68
End: 2023-11-16
Payer: COMMERCIAL

## 2023-11-16 DIAGNOSIS — M54.12 CERVICAL RADICULOPATHY: Primary | ICD-10-CM

## 2023-11-16 PROCEDURE — 97112 NEUROMUSCULAR REEDUCATION: CPT

## 2023-11-16 PROCEDURE — 97140 MANUAL THERAPY 1/> REGIONS: CPT

## 2023-11-16 PROCEDURE — 97110 THERAPEUTIC EXERCISES: CPT

## 2023-11-16 NOTE — PROGRESS NOTES
Daily Note     Today's date: 2023  Patient name: Erna Penn  : 1955  MRN: [id number]  Referring provider: Fish Lamb DO  Dx:   Encounter Diagnosis     ICD-10-CM    1. Cervical radiculopathy  M54.12                      Subjective: Patient reports feeling better with her neck. Objective: See treatment diary below  FOTO given (_____)      Assessment: Pt presented to outpatient physical therapy at [facility] with complaints of B arm pain at time lasting up to 90 seconds with some tightness in her Uts that began about 2 months ago without cause. She presents with slightly decreased cervical range of motion, decreased postural strength, limited flexibility, poor postural awareness, decreased tolerance to activity and decreased functional mobility due to Cervical radiculopathy (primary encounter diagnosis) from cervical DDD. She has almost complete relief from pain with postural correction and manual tx. She would benefit from skilled PT services in order to address these deficits and reach maximum level of function. Pt tolerated below TE well, responded to manuals very well. Pt cervical ROM has improved and tenderness significantly.        Plan: d/c      POC EXPIRES On:  23  PRECAUTIONS:  None  CO-MORBIDITES:  None  PERSONAL FACTORS:  None      Manuals HEP 10/6 10/9 10/24 11/7 11/16       Suboccipital release  5' 5' 5'   5 5       STM lower cervical paraspinals / UT B  5' 15' 15' 15 15                                 Neuro Re-Ed     Wall angels 10/6 15 20 20x5" 20x 5" 20x5"       Supine chin tucks 10/6 5" 10 5" 15 5" 15 5" 15 5" 15       Retro UBE   L4 4' L4 4' L4 5' L4 5'       TB Rows B   Plum 25 Plum 25 Plum 25 Plum 30       TB LPD B   Blue 25 Blue 25 Blue 25 Blue 30                                 Ther Ex    Doorway pec stretch 10/6 15" 3 15" 5 15" 5 15" 5 15" 5 Ther Activity                              Gait Training                              Modalities

## 2023-11-20 ENCOUNTER — EVALUATION (OUTPATIENT)
Dept: PHYSICAL THERAPY | Facility: CLINIC | Age: 68
End: 2023-11-20
Payer: COMMERCIAL

## 2023-11-20 DIAGNOSIS — M54.12 CERVICAL RADICULOPATHY: Primary | ICD-10-CM

## 2023-11-20 PROCEDURE — 97112 NEUROMUSCULAR REEDUCATION: CPT | Performed by: PHYSICAL THERAPIST

## 2023-11-20 PROCEDURE — 97110 THERAPEUTIC EXERCISES: CPT | Performed by: PHYSICAL THERAPIST

## 2023-11-20 PROCEDURE — 97140 MANUAL THERAPY 1/> REGIONS: CPT | Performed by: PHYSICAL THERAPIST

## 2023-11-20 NOTE — LETTER
2023    Kenyetta Gaitan DO  [address] [facility] [address]    Patient: Judith Sandra   YOB: 1955   Date of Visit: 2023     Encounter Diagnosis     ICD-10-CM    1. Cervical radiculopathy  M54.12           Dear Dr. Myron Rudolhp: Thank you for your recent referral of Judith Sandra. Please review the attached evaluation summary from [facility] recent visit. Please verify that you agree with the plan of care by signing the attached order. If you have any questions or concerns, please do not hesitate to call. I sincerely appreciate the opportunity to share in the care of one of your patients and hope to have another opportunity to work with you in the near future. Sincerely,    Fernie Glass, PT      Referring Provider:      I certify that I have read the below Plan of Care and certify the need for these services furnished under this plan of treatment while under my care. Kenyetta Gaitan DO  [address] [facility] [address]  Via Fax: [phone]          PT Re-evaluation    Today's date: 2023  Patient name: Judith Sandra  : 1955  MRN: [id number]  Referring provider: Kenyetta Gaitan DO  Dx:   Encounter Diagnosis     ICD-10-CM    1. Cervical radiculopathy  M54.12                      Assessment  Assessment details: Judith Sandra is a 79 y.o. female who presented to outpatient physical therapy at [facility] with complaints of B arm pain at time lasting up to 90 seconds with some tightness in her UTs that began about 4 months ago without cause. She presented with slightly decreased cervical range of motion, decreased postural strength, limited flexibility, poor postural awareness, decreased tolerance to activity and decreased functional mobility due to Cervical radiculopathy (primary encounter diagnosis) from cervical DDD.   She has complete relief from pain with postural correction and manual tx and very little pain remaining. She is very close to D/C status now and will likely be ready for HEP only in another week. Thank you for the referral!  Impairments: abnormal or restricted ROM, activity intolerance, impaired physical strength and pain with function  Barriers to therapy: None  Understanding of Dx/Px/POC: excellent  Goals  ST. Independent with HEP in 2 weeks - Met  2. Increase cervical AROM to WNL all motions in 3 weeks - Met   3. Good postural awareness in 2 weeks - Met    LT. Achieve FOTO score of 72/100 in 6 weeks - Met  2. Able to complete all tasks without UE pain in 6 weeks - Met  3. Strength B traps = 5/5 in 6 weeks - Mostly Met  4. No UT or cervical paraspinal tightness in 6 weeks - Mostly Met    Plan  Patient would benefit from: skilled PT  Planned modality interventions: thermotherapy: hydrocollator packs  Planned therapy interventions: ADL retraining, flexibility, functional ROM exercises, home exercise program, joint mobilization, manual therapy, neuromuscular re-education, postural training, strengthening, stretching, therapeutic activities and therapeutic exercise  Frequency: 1x week  Duration in weeks: 2  Treatment plan discussed with: patient      Subjective Evaluation    History of Present Illness  Mechanism of injury: Pt reports having having R arm pain that started about 4 months ago without cause with L UE pain at times too. Most pain started in UT and under scapula with radiating pain down either arm with heaviness that lasts no longer than 90 seconds. Works remotely. Cervical x-rays showed DDD C4-7 and lordotic straightening. 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 Has a wrist injury but feels fine while wearing her wrist brace. Lifts her 3 grandchildren often and feeling much better since starting PT.            Recurrent probem    Quality of life: excellent    Patient Goals  Patient goals for therapy: decreased pain, increased motion, increased strength and return to sport/leisure activities    Pain  Current pain ratin  At best pain ratin  At worst pain ratin  Quality: tight  Progression: improved    Social Support    Employment status: working  Treatments  Current treatment: physical therapy      Objective     Concurrent Complaints  Negative for disturbed sleep, dizziness and headaches    Postural Observations  Seated posture: good  Standing posture: good  Correction of posture: makes symptoms better      Palpation     Additional Palpation Details  No tightness B pec minor, UT, min B cervical paraspinals. Tenderness   Cervical Spine   No tenderness in the facet joint and spinous process. Neurological Testing     Sensation   Cervical/Thoracic   Left   Intact: light touch    Right   Intact: light touch    Reflexes   Left   Biceps (C5/C6): normal (2+)    Right   Biceps (C5/C6): normal (2+)    Active Range of Motion   Cervical/Thoracic Spine       Cervical    Flexion:  WFL  Extension:  WFL  Left lateral flexion:  WFL  Right lateral flexion:  WFL  Left rotation:  WFL  Right rotation:  [facility]    Strength/Myotome Testing     Left Shoulder     Planes of Motion   Flexion: 5   Extension: 5   Abduction: 5     Isolated Muscles   Middle trapezius: 4+     Right Shoulder     Planes of Motion   Flexion: 5   Extension: 5   Abduction: 5     Isolated Muscles   Middle trapezius: 4+     Left Elbow   Flexion: 5  Extension: 5    Right Elbow   Flexion: 5  Extension: 5    Tests   Cervical   Negative vertical compression. Left Shoulder   Negative ULTT1, ULTT3 and ULTT4. Right Shoulder   Negative ULTT1, ULTT3 and ULTT4. Lumbar   Negative vertical compression.      General Comments:    Upper quarter screen   Shoulder: unremarkable  Neuro Exam:     Headaches   Patient reports headaches: No.         POC EXPIRES On:  23  PRECAUTIONS:  None  CO-MORBIDITES:  None  PERSONAL FACTORS:  None      Manuals HEP 10/6 10/9 10/24 11/7 11/16 11/20 Suboccipital release  5' 5' 5'   5 5 5'      STM lower cervical paraspinals / UT B  5' 15' 15' 15 15 15'                                Neuro Re-Ed     Wall angels 10/6 15 20 20x5" 20x 5" 20x5" 20      Supine chin tucks 10/6 5" 10 5" 15 5" 15 5" 15 5" 15 5" 15      Retro UBE   L4 4' L4 4' L4 5' L4 5' L4 5'      TB Rows B   Plum 25 Plum 25 Plum 25 Plum 30 Plum 30      TB LPD B   Blue 25 Blue 25 Blue 25 Blue 30 Blue 30                                Ther Ex    Doorway pec stretch 10/6 15" 3 15" 5 15" 5 15" 5 15" 5 15" 5                                                                                                 Ther Activity                              Gait Training                              Modalities

## 2023-11-20 NOTE — PROGRESS NOTES
PT Re-evaluation    Today's date: 2023  Patient name: Lorri Crigler  : 1955  MRN: [id number]  Referring provider: Eliecer Garcia DO  Dx:   Encounter Diagnosis     ICD-10-CM    1. Cervical radiculopathy  M54.12                      Assessment  Assessment details: Lorri Crigler is a 79 y.o. female who presented to outpatient physical therapy at [facility] with complaints of B arm pain at time lasting up to 90 seconds with some tightness in her UTs that began about 4 months ago without cause. She presented with slightly decreased cervical range of motion, decreased postural strength, limited flexibility, poor postural awareness, decreased tolerance to activity and decreased functional mobility due to Cervical radiculopathy (primary encounter diagnosis) from cervical DDD. She has complete relief from pain with postural correction and manual tx and very little pain remaining. She is very close to D/C status now and will likely be ready for HEP only in another week. Thank you for the referral!  Impairments: abnormal or restricted ROM, activity intolerance, impaired physical strength and pain with function  Barriers to therapy: None  Understanding of Dx/Px/POC: excellent  Goals  ST. Independent with HEP in 2 weeks - Met  2. Increase cervical AROM to WNL all motions in 3 weeks - Met   3. Good postural awareness in 2 weeks - Met    LT. Achieve FOTO score of 72/100 in 6 weeks - Met  2. Able to complete all tasks without UE pain in 6 weeks - Met  3. Strength B traps = 5/5 in 6 weeks - Mostly Met  4.   No UT or cervical paraspinal tightness in 6 weeks - Mostly Met    Plan  Patient would benefit from: skilled PT  Planned modality interventions: thermotherapy: hydrocollator packs  Planned therapy interventions: ADL retraining, flexibility, functional ROM exercises, home exercise program, joint mobilization, manual therapy, neuromuscular re-education, postural training, strengthening, stretching, therapeutic activities and therapeutic exercise  Frequency: 1x week  Duration in weeks: 2  Treatment plan discussed with: patient      Subjective Evaluation    History of Present Illness  Mechanism of injury: Pt reports having having R arm pain that started about 4 months ago without cause with L UE pain at times too. Most pain started in UT and under scapula with radiating pain down either arm with heaviness that lasts no longer than 90 seconds. Works remotely. Cervical x-rays showed DDD C4-7 and lordotic straightening. 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 Lifts her 3 grandchildren often and feeling much better since starting PT. Recurrent probem    Quality of life: excellent    Patient Goals  Patient goals for therapy: decreased pain, increased motion, increased strength and return to sport/leisure activities    Pain  Current pain ratin  At best pain ratin  At worst pain ratin  Quality: tight  Progression: improved    Social Support    Employment status: working  Treatments  Current treatment: physical therapy      Objective     Concurrent Complaints  Negative for disturbed sleep, dizziness and headaches    Postural Observations  Seated posture: good  Standing posture: good  Correction of posture: makes symptoms better      Palpation     Additional Palpation Details  No tightness B pec minor, UT, min B cervical paraspinals. Tenderness   Cervical Spine   No tenderness in the facet joint and spinous process.      Neurological Testing     Sensation   Cervical/Thoracic   Left   Intact: light touch    Right   Intact: light touch    Reflexes   Left   Biceps (C5/C6): normal (2+)    Right   Biceps (C5/C6): normal (2+)    Active Range of Motion   Cervical/Thoracic Spine       Cervical    Flexion:  WFL  Extension:  WFL  Left lateral flexion:  WFL  Right lateral flexion:  WFL  Left rotation:  WFL  Right rotation:  [facility]    Strength/Myotome Testing     Left Shoulder     Planes of Motion   Flexion: 5   Extension: 5   Abduction: 5     Isolated Muscles   Middle trapezius: 4+     Right Shoulder     Planes of Motion   Flexion: 5   Extension: 5   Abduction: 5     Isolated Muscles   Middle trapezius: 4+     Left Elbow   Flexion: 5  Extension: 5    Right Elbow   Flexion: 5  Extension: 5    Tests   Cervical   Negative vertical compression. Left Shoulder   Negative ULTT1, ULTT3 and ULTT4. Right Shoulder   Negative ULTT1, ULTT3 and ULTT4. Lumbar   Negative vertical compression.      General Comments:    Upper quarter screen   Shoulder: unremarkable  Neuro Exam:     Headaches   Patient reports headaches: No.         POC EXPIRES On:  12/4/23  PRECAUTIONS:  None  CO-MORBIDITES:  None  PERSONAL FACTORS:  None      Manuals HEP 10/6 10/9 10/24 11/7 11/16 11/20      Suboccipital release  5' 5' 5'   5 5 5'      STM lower cervical paraspinals / UT B  5' 15' 15' 15 15 15'                                Neuro Re-Ed     Wall angels 10/6 15 20 20x5" 20x 5" 20x5" 20      Supine chin tucks 10/6 5" 10 5" 15 5" 15 5" 15 5" 15 5" 15      Retro UBE   L4 4' L4 4' L4 5' L4 5' L4 5'      TB Rows B   Plum 25 Plum 25 Plum 25 Plum 30 Plum 30      TB LPD B   Blue 25 Blue 25 Blue 25 Blue 30 Blue 30                                Ther Ex    Doorway pec stretch 10/6 15" 3 15" 5 15" 5 15" 5 15" 5 15" 5                                                                                                 Ther Activity                              Gait Training                              Modalities

## 2023-11-27 ENCOUNTER — OFFICE VISIT (OUTPATIENT)
Dept: PHYSICAL THERAPY | Facility: CLINIC | Age: 68
End: 2023-11-27
Payer: COMMERCIAL

## 2023-11-27 DIAGNOSIS — M54.12 CERVICAL RADICULOPATHY: Primary | ICD-10-CM

## 2023-11-27 PROCEDURE — 97140 MANUAL THERAPY 1/> REGIONS: CPT | Performed by: PHYSICAL THERAPIST

## 2023-11-27 PROCEDURE — 97112 NEUROMUSCULAR REEDUCATION: CPT | Performed by: PHYSICAL THERAPIST

## 2023-11-27 PROCEDURE — 97110 THERAPEUTIC EXERCISES: CPT | Performed by: PHYSICAL THERAPIST

## 2023-11-27 NOTE — PROGRESS NOTES
PT Re-evaluation + D/C    Today's date: 2023  Patient name: Tamara Fenton  : 1955  MRN: [id number]  Referring provider: Hoa Kay DO  Dx:   Encounter Diagnosis     ICD-10-CM    1. Cervical radiculopathy  M54.12                      Assessment  Assessment details: Tamara Fenton is a 79 y.o. female who presented to outpatient physical therapy at [facility] with complaints of B arm pain at time lasting up to a few seconds with some tightness in her UTs that began about 4 months ago without cause. She presented with slightly decreased cervical range of motion, decreased postural strength, limited flexibility, poor postural awareness, decreased tolerance to activity and decreased functional mobility due to Cervical radiculopathy (primary encounter diagnosis) from cervical DDD. She has complete relief from pain with postural correction and manual tx and very little pain remaining. She is now ready for D/C with little to no pain. Impairments: abnormal or restricted ROM, activity intolerance, impaired physical strength and pain with function  Barriers to therapy: None  Understanding of Dx/Px/POC: excellent  Goals  ST. Independent with HEP in 2 weeks - Met  2. Increase cervical AROM to WNL all motions in 3 weeks - Met   3. Good postural awareness in 2 weeks - Met    LT. Achieve FOTO score of 72/100 in 6 weeks - Met  2. Able to complete all tasks without UE pain in 6 weeks - Met  3. Strength B traps = 5/5 in 6 weeks - Met  4. No UT or cervical paraspinal tightness in 6 weeks - Met    Plan  Patient would benefit from: skilled PT  Treatment plan discussed with: patient      Subjective Evaluation    History of Present Illness  Mechanism of injury: Pt reports having having R arm pain that started about 4 months ago without cause with L UE pain at times too.   Most pain started in UT and under scapula with radiating pain down either arm with heaviness that lasts no longer than a few seconds. Works remotely. Cervical x-rays showed DDD C4-7 and lordotic straightening. 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 Has a wrist injury but feels fine while wearing her wrist brace. Lifts her 3 grandchildren often and feeling much better since starting PT. Recurrent probem    Quality of life: excellent    Pain  Current pain ratin  At best pain ratin  At worst pain ratin  Progression: resolved    Social Support    Employment status: working  Treatments  Current treatment: physical therapy      Objective     Concurrent Complaints  Negative for disturbed sleep, dizziness and headaches    Postural Observations  Seated posture: good  Standing posture: good  Correction of posture: makes symptoms better      Palpation     Additional Palpation Details  No tightness B pec minor, UT, cervical paraspinals. Tenderness   Cervical Spine   No tenderness in the facet joint and spinous process. Neurological Testing     Sensation   Cervical/Thoracic   Left   Intact: light touch    Right   Intact: light touch    Reflexes   Left   Biceps (C5/C6): normal (2+)    Right   Biceps (C5/C6): normal (2+)    Active Range of Motion   Cervical/Thoracic Spine       Cervical    Flexion:  WFL  Extension:  WFL  Left lateral flexion:  WFL  Right lateral flexion:  WFL  Left rotation:  WFL  Right rotation:  [facility]    Strength/Myotome Testing     Left Shoulder     Planes of Motion   Flexion: 5   Extension: 5   Abduction: 5     Isolated Muscles   Middle trapezius: 5     Right Shoulder     Planes of Motion   Flexion: 5   Extension: 5   Abduction: 5     Isolated Muscles   Middle trapezius: 5     Left Elbow   Flexion: 5  Extension: 5    Right Elbow   Flexion: 5  Extension: 5    Tests   Cervical   Negative vertical compression. Left Shoulder   Negative ULTT1, ULTT3 and ULTT4. Right Shoulder   Negative ULTT1, ULTT3 and ULTT4. Lumbar   Negative vertical compression. General Comments:    Upper quarter screen   Shoulder: unremarkable  Neuro Exam:     Headaches   Patient reports headaches: No.         POC EXPIRES On:  12/4/23  PRECAUTIONS:  None  CO-MORBIDITES:  None  PERSONAL FACTORS:  None      Manuals HEP 10/6 10/9 10/24 11/7 11/16 11/20 11/27     Suboccipital release  5' 5' 5'   5 5 5' 5'     STM lower cervical paraspinals / UT B  5' 15' 15' 15 15 15' 15'                               Neuro Re-Ed     Wall angels 10/6 15 20 20x5" 20x 5" 20x5" 20 20     Supine chin tucks 10/6 5" 10 5" 15 5" 15 5" 15 5" 15 5" 15 5" 15     Retro UBE   L4 4' L4 4' L4 5' L4 5' L4 5' L4 5'     TB Rows B   Plum 25 Plum 25 Plum 25 Plum 30 Plum 30 Plum 30     TB LPD B   Blue 25 Blue 25 Blue 25 Blue 30 Blue 30 Blue 30                               Ther Ex    Doorway pec stretch 10/6 15" 3 15" 5 15" 5 15" 5 15" 5 15" 5 15" 5                                                                                                Ther Activity                              Gait Training                              Modalities

## 2023-12-19 ENCOUNTER — TELEPHONE (OUTPATIENT)
Dept: GASTROENTEROLOGY | Facility: CLINIC | Age: 68
End: 2023-12-19

## 2023-12-19 NOTE — TELEPHONE ENCOUNTER
----- Message from Macario Prabhakar MD sent at 12/19/2023  9:53 AM EST -----  Regarding: Thursday  Maryanne,    Can you schedule patient for an appointment on Thursday?  I see a few spots open.  I want to discuss her results and start treatment for suspected autoimmune liver disease.    Thanks    V  ----- Message -----  From: Tiara, [email or web address] Amb Lab Results In  Sent: 12/19/2023   8:06 AM EST  To: Macario Prabhakar MD

## 2023-12-21 ENCOUNTER — TELEPHONE (OUTPATIENT)
Dept: NEUROLOGY | Facility: CLINIC | Age: 68
End: 2023-12-21

## 2023-12-21 ENCOUNTER — TELEPHONE (OUTPATIENT)
Age: 68
End: 2023-12-21

## 2023-12-21 ENCOUNTER — OFFICE VISIT (OUTPATIENT)
Dept: GASTROENTEROLOGY | Facility: CLINIC | Age: 68
End: 2023-12-21
Payer: COMMERCIAL

## 2023-12-21 VITALS
HEART RATE: 66 BPM | DIASTOLIC BLOOD PRESSURE: 88 MMHG | WEIGHT: 179 LBS | TEMPERATURE: 97.7 F | SYSTOLIC BLOOD PRESSURE: 150 MMHG | BODY MASS INDEX: 29.82 KG/M2 | HEIGHT: 65 IN

## 2023-12-21 DIAGNOSIS — H54.7 VISION PROBLEM: ICD-10-CM

## 2023-12-21 DIAGNOSIS — R42 DIZZINESS: ICD-10-CM

## 2023-12-21 DIAGNOSIS — R41.3 MEMORY CHANGE: ICD-10-CM

## 2023-12-21 DIAGNOSIS — K75.4 AUTOIMMUNE HEPATITIS (HCC): Primary | ICD-10-CM

## 2023-12-21 DIAGNOSIS — E03.8 OTHER SPECIFIED HYPOTHYROIDISM: ICD-10-CM

## 2023-12-21 PROCEDURE — 99214 OFFICE O/P EST MOD 30 MIN: CPT | Performed by: INTERNAL MEDICINE

## 2023-12-21 RX ORDER — LEVOTHYROXINE SODIUM 75 MCG
TABLET ORAL
COMMUNITY
Start: 2023-11-05

## 2023-12-21 RX ORDER — LYSINE HCL 500 MG
1 TABLET ORAL 2 TIMES DAILY
COMMUNITY

## 2023-12-21 RX ORDER — URSODIOL 300 MG/1
300 CAPSULE ORAL 3 TIMES DAILY
Qty: 90 CAPSULE | Refills: 2 | Status: SHIPPED | OUTPATIENT
Start: 2023-12-21

## 2023-12-21 NOTE — PROGRESS NOTES
[facility]  Outpatient Follow-up  Encounter: [id number]    PATIENT INFO     Name: Estella Stein  YOB: 1955   Age: 68 y.o.   Sex: female   MRN: [id number]    ASSESSMENT & PLAN     Estella Stein is a 68 y.o. female with past medical history significant for hypothyroidism, Sjogren's disease, and chronic elevation in LFTs who presents to hepatology clinic for follow-up of her elevated liver enzymes. Initial plan was to initiate therapy for possible AIH/Overlap Syndrome    Diagnoses and all orders for this visit:    Dizziness  Vision problem  Memory change  Patient reports ongoing issues with vision changes. States eyes feel crossed with associated headache, however, no physical exam changes occur when these episodes happen. Has undergone ophtho eval without significant finding. Also endorsing new onset dizziness, feeling off balance, headaches/sinus pressure, and most recently change in memory/intermittent episodes of increased confusion.  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 Discussed and reviewed labs and imaging in detail with patient. MRI reviewed personally in PACS.   Plan was to initiated steroids and ursodiol and monitor LFT's  Unfortunately, patient with new  neurologic symptoms as detailed above  Will hold off on starting steroids until completed neurologic evaluation  Start Ursodiol 300mg TID now  Check TPMT to consider steroid sparing therapy in future  Keep scheduled follow up appointment in February  Avoid alcohol and hepatotoxic medications  -     ursodiol (ACTIGALL) 300 mg capsule; Take 1 capsule (300 mg total) by mouth 3 (three) times a day  -     TPMT ENZYME ACTIVITY,BLOOD; Standing  -     Comprehensive metabolic panel; Standing  -     CBC and differential; Standing  -     Protime-INR; Standing  -     TPMT ENZYME ACTIVITY,BLOOD    FOLLOW-UP: keep scheduled apt in Feb    HISTORY OF PRESENT ILLNESS       Estella Stein is a 68 y.o. female with past medical history significant for hypothyroidism, Sjogren's disease, and chronic elevation in LFTs who presents to hepatology clinic for follow-up of her elevated liver enzymes.    Patient with positive AMA and underwent liver biopsy 5/2023 significant for patchy portal interface hepatitis patchy periportal fibrosis.  At that time plan was to follow her LFTs, and if they fail to trend downward or continue upward trend would consider initiation of steroids plus or minus ursodiol for possible overlap syndrome.  She did undergo MRI significant for mild hepatomegaly and no other suspicious findings.    On exam today, patient reports feeling fine from a liver standpoint.  However, she endorses issues with change in her vision.  Describes sensation of being cross eyed although her eyes do not physically cross.  She has seen ophthalmology without significant finding.  Over the past few days she has noticed increased lightheadedness and feeling off balance as well as significant changes in her memory and cognition.     ENDOSCOPIC HISTORY     UPPER ENDOSCOPY: N/A  COLONOSCOPY: 2/2022 colonoscopy with large internal hemorrhoids without bleeding and all other locations appeared normal, recommendation for repeat colonoscopy in  5 years due to personal history of colon polyps    REVIEW OF SYSTEMS     CONSTITUTIONAL: Denies any fever, chills, rigors, and weight loss  HEENT: No earache or tinnitus, denies hearing loss. + visual disturbance  CARDIOVASCULAR: No chest pain or palpitations  RESPIRATORY: Denies any cough, hemoptysis, shortness of breath or dyspnea on exertion  GASTROINTESTINAL: As noted in the History of Present Illness  GENITOURINARY: No problems with urination, denies any hematuria or dysuria  NEUROLOGIC: + dizziness and lightheadedness, denies headaches   MUSCULOSKELETAL: Denies any muscle or joint pain   SKIN: Denies skin rashes or itching  ENDOCRINE: Denies excessive thirst, denies intolerance to heat or cold  PSYCHOSOCIAL: Denies depression or anxiety, denies any recent memory loss     Historical Information   Past Medical History:   Diagnosis Date    Anemia     I stopped taking iron supplements after I went through menop    Disease of thyroid gland     Hypothyroid     Sjogren's disease (HCC)      Past Surgical History:   Procedure Laterality Date    CARPAL TUNNEL RELEASE       SECTION      COLONOSCOPY      HYSTERECTOMY       approx    IR BIOPSY LIVER RANDOM  2023    LIVER BIOPSY  ?    OOPHORECTOMY Bilateral     approx 2014    SKIN BIOPSY      TUBAL LIGATION       Social History   Social History     Substance and Sexual Activity   Alcohol Use Yes    Comment: 1 drink social drinker     Social History     Substance and Sexual Activity   Drug Use Never     Social History     Tobacco Use   Smoking Status Former    Current packs/day: 0.00    Average packs/day: 0.3 packs/day for 15.0 years (3.8 ttl pk-yrs)    Types: Cigarettes    Start date: 1971    Quit date: 1979    Years since quittin.5   Smokeless Tobacco Never   Tobacco Comments    I was never a heavy smoker     Family History   Problem Relation Age of Onset    Breast cancer Mother         late 80s onset    Arthritis Mother      "Hypothyroidism Mother     Vision loss Mother     Prostate cancer Father         diagnosed in his 80s    Asthma Father     Hypothyroidism Father     No Known Problems Daughter     Anemia Maternal Grandmother     No Known Problems Maternal Grandfather     No Known Problems Paternal Grandmother     No Known Problems Paternal Grandfather     Skin cancer Brother     Prostate cancer Brother 74    Colon polyps Neg Hx     Colon cancer Neg Hx          MEDICATIONS AND ALLERGIES     Current Outpatient Medications   Medication Instructions    albuterol (PROVENTIL HFA,VENTOLIN HFA) 90 mcg/act inhaler 2 puffs, Inhalation, Every 6 hours PRN    Calcium Carbonate-Vit D-Min (Calcium 600+D Plus Minerals) 600-400 MG-UNIT TABS 1 tablet, Oral, 2 times daily    CALCIUM-VITAMIN D PO Oral    citalopram (CeleXA) 20 mg tablet No dose, route, or frequency recorded.    fexofenadine (ALLEGRA) 180 mg, Oral, As needed    ibuprofen (MOTRIN) 400 mg, Oral, Every 6 hours PRN    imiquimod (ALDARA) 5 % cream Apply sparingly at bedtime 5 nights per week for 6 weeks to affected area of right arm.    Multiple Vitamin (multivitamin) tablet 1 tablet, Oral, Daily    NON FORMULARY Garlacin     Omega-3 Fatty Acids (OMEGA-3 FISH OIL PO) Oral    PreviDent 5000 Dry Mouth 1.1 % GEL No dose, route, or frequency recorded.    Sodium Fluoride 5000 PPM 1.1 % PSTE USE 1-2 TIMES AS INSTRUCTED    Synthroid 75 MCG tablet TAKE 1 TABLET BY MOUTH EVERY DAY IN THE MORNING ON AN EMPTY STOMACH FOR 30 DAYS    Synthroid 75 mcg, Daily    TURMERIC PO Oral     Allergies   Allergen Reactions    Other Tongue Swelling     Eggplant - dysphagia    Shellfish-Derived Products - Food Allergy Swelling       PHYSICAL EXAM      Objective   Blood pressure 150/88, pulse 66, temperature 97.7 °F (36.5 °C), temperature source Tympanic, height 5' 5\" (1.651 m), weight 81.2 kg (179 lb). Body mass index is 29.79 kg/m².    General Appearance:   Alert, cooperative, no distress   HEENT:   Normocephalic, " atraumatic, anicteric     Neck:   Supple, symmetrical, trachea midline   Lungs:   Equal chest rise, respirations unlabored    Heart:   Regular rate and rhythm   Abdomen:   Soft, non-tender, non-distended; normal bowel sounds; no masses, no organomegaly    Rectal:   Deferred    Extremities:   No cyanosis, clubbing or edema    Neuro:   Moves all 4 extremities    Skin:   No jaundice, rashes, or lesions      LABORATORY RESULTS     No visits with results within 1 Day(s) from this visit.   Latest known visit with results is:   Ancillary Orders on 11/17/2023   Component Date Value    Glucose, Random 12/18/2023 101 (H)     BUN 12/18/2023 23     Creatinine 12/18/2023 0.90     eGFR 12/18/2023 71     SL AMB BUN/CREATININE RA* 12/18/2023 26     Sodium 12/18/2023 142     Potassium 12/18/2023 4.5     Chloride 12/18/2023 106     CO2 12/18/2023 22     CALCIUM 12/18/2023 9.2     Protein, Total 12/18/2023 6.9     Albumin 12/18/2023 4.0     Globulin, Total 12/18/2023 2.9     Albumin/Globulin Ratio 12/18/2023 1.4     TOTAL BILIRUBIN 12/18/2023 0.5     Alk Phos Isoenzymes 12/18/2023 307 (H)     AST 12/18/2023 68 (H)     ALT 12/18/2023 72 (H)      No results found.    RADIOLOGY RESULTS: I have personally reviewed pertinent imaging studies.      Gisela Oneal D.O.  Gastroenterology Fellow  [facility] [address]  Division of Gastroenterology & Hepatology  Available of [employer]    ** Please Note: This note is constructed using a voice recognition dictation system. **

## 2023-12-21 NOTE — TELEPHONE ENCOUNTER
Patient calling to clarify which blood work she is to have completed today as she states she just had blood work completed on 12/18/23. Per clinical team, patient is to have cbc, cmp pt/inr, tsh completed and then repeat every four weeks. Patient understood and had no further questions or concerns.

## 2023-12-21 NOTE — TELEPHONE ENCOUNTER
Estella Stein  called to sche a New Patient appt - due to PCP referral. Patient was triaged, sent over to provider, advised of process, referral status was updated, insurance was verified.

## 2023-12-27 NOTE — TELEPHONE ENCOUNTER
1st ATTEMPT- Called pt to [facility] appt. Offered open slot on 02/13/24 at 3pm w/ . Location address was provided. Referral was updated.

## 2024-01-03 LAB
ALBUMIN SERPL-MCNC: 4 G/DL (ref 3.9–4.9)
ALBUMIN/GLOB SERPL: 1.4 {RATIO} (ref 1.2–2.2)
ALP SERPL-CCNC: 308 IU/L (ref 44–121)
ALT SERPL-CCNC: 62 IU/L (ref 0–32)
AST SERPL-CCNC: 49 IU/L (ref 0–40)
BASOPHILS # BLD AUTO: 0 X10E3/UL (ref 0–0.2)
BASOPHILS NFR BLD AUTO: 1 %
BILIRUB SERPL-MCNC: 0.5 MG/DL (ref 0–1.2)
BUN SERPL-MCNC: 23 MG/DL (ref 8–27)
BUN/CREAT SERPL: 24 (ref 12–28)
CALCIUM SERPL-MCNC: 9 MG/DL (ref 8.7–10.3)
CHLORIDE SERPL-SCNC: 105 MMOL/L (ref 96–106)
CO2 SERPL-SCNC: 26 MMOL/L (ref 20–29)
CREAT SERPL-MCNC: 0.97 MG/DL (ref 0.57–1)
EGFR: 64 ML/MIN/1.73
EOSINOPHIL # BLD AUTO: 0.5 X10E3/UL (ref 0–0.4)
EOSINOPHIL NFR BLD AUTO: 15 %
ERYTHROCYTE [DISTWIDTH] IN BLOOD BY AUTOMATED COUNT: 12.4 % (ref 11.7–15.4)
GLOBULIN SER-MCNC: 2.9 G/DL (ref 1.5–4.5)
GLUCOSE SERPL-MCNC: 95 MG/DL (ref 70–99)
HCT VFR BLD AUTO: 36.9 % (ref 34–46.6)
HGB BLD-MCNC: 11.8 G/DL (ref 11.1–15.9)
IMM GRANULOCYTES # BLD: 0 X10E3/UL (ref 0–0.1)
IMM GRANULOCYTES NFR BLD: 0 %
INR PPP: 1 (ref 0.9–1.2)
LYMPHOCYTES # BLD AUTO: 1.2 X10E3/UL (ref 0.7–3.1)
LYMPHOCYTES NFR BLD AUTO: 36 %
MCH RBC QN AUTO: 29.4 PG (ref 26.6–33)
MCHC RBC AUTO-ENTMCNC: 32 G/DL (ref 31.5–35.7)
MCV RBC AUTO: 92 FL (ref 79–97)
MONOCYTES # BLD AUTO: 0.3 X10E3/UL (ref 0.1–0.9)
MONOCYTES NFR BLD AUTO: 10 %
MORPHOLOGY BLD-IMP: ABNORMAL
NEUTROPHILS # BLD AUTO: 1.2 X10E3/UL (ref 1.4–7)
NEUTROPHILS NFR BLD AUTO: 38 %
PLATELET # BLD AUTO: 80 X10E3/UL (ref 150–450)
POTASSIUM SERPL-SCNC: 4.1 MMOL/L (ref 3.5–5.2)
PROT SERPL-MCNC: 6.9 G/DL (ref 6–8.5)
PROTHROMBIN TIME: 10.3 SEC (ref 9.1–12)
RBC # BLD AUTO: 4.02 X10E6/UL (ref 3.77–5.28)
REF LAB TEST METHOD: NORMAL
SODIUM SERPL-SCNC: 142 MMOL/L (ref 134–144)
T4 FREE SERPL DIALY-MCNC: 0.94 NG/DL
TEST INTERPRETATION: NORMAL
TPMT RBC-CCNC: 27.7 UNITS/ML RBC
TSH SERPL-ACNC: 3.1 UU/ML
WBC # BLD AUTO: 3.3 X10E3/UL (ref 3.4–10.8)

## 2024-01-04 ENCOUNTER — HOSPITAL ENCOUNTER (OUTPATIENT)
Dept: MRI IMAGING | Facility: HOSPITAL | Age: 69
End: 2024-01-04
Payer: COMMERCIAL

## 2024-01-04 DIAGNOSIS — H54.7 VISION PROBLEM: ICD-10-CM

## 2024-01-04 DIAGNOSIS — R41.3 MEMORY CHANGE: ICD-10-CM

## 2024-01-04 DIAGNOSIS — R42 DIZZINESS: ICD-10-CM

## 2024-01-04 PROCEDURE — G1004 CDSM NDSC: HCPCS

## 2024-01-04 PROCEDURE — A9585 GADOBUTROL INJECTION: HCPCS | Performed by: RADIOLOGY

## 2024-01-04 PROCEDURE — 70553 MRI BRAIN STEM W/O & W/DYE: CPT

## 2024-01-04 RX ORDER — GADOBUTROL 604.72 MG/ML
8 INJECTION INTRAVENOUS
Status: COMPLETED | OUTPATIENT
Start: 2024-01-04 | End: 2024-01-04

## 2024-01-04 RX ADMIN — GADOBUTROL 8 ML: 604.72 INJECTION INTRAVENOUS at 14:21

## 2024-01-12 ENCOUNTER — TRANSCRIBE ORDERS (OUTPATIENT)
Dept: GASTROENTEROLOGY | Facility: CLINIC | Age: 69
End: 2024-01-12

## 2024-01-13 DIAGNOSIS — R79.89 ABNORMAL LIVER FUNCTION TESTS: Primary | ICD-10-CM

## 2024-01-13 DIAGNOSIS — K75.4 AUTOIMMUNE HEPATITIS (HCC): ICD-10-CM

## 2024-01-15 RX ORDER — URSODIOL 300 MG/1
300 CAPSULE ORAL 3 TIMES DAILY
Qty: 270 CAPSULE | Refills: 1 | Status: SHIPPED | OUTPATIENT
Start: 2024-01-15

## 2024-01-19 ENCOUNTER — TELEPHONE (OUTPATIENT)
Age: 69
End: 2024-01-19

## 2024-01-19 ENCOUNTER — OFFICE VISIT (OUTPATIENT)
Age: 69
End: 2024-01-19
Payer: COMMERCIAL

## 2024-01-19 VITALS
SYSTOLIC BLOOD PRESSURE: 148 MMHG | DIASTOLIC BLOOD PRESSURE: 98 MMHG | HEART RATE: 71 BPM | WEIGHT: 176 LBS | OXYGEN SATURATION: 99 % | HEIGHT: 65 IN | TEMPERATURE: 97.3 F | BODY MASS INDEX: 29.32 KG/M2

## 2024-01-19 DIAGNOSIS — D70.9 NEUTROPENIA, UNSPECIFIED TYPE (HCC): ICD-10-CM

## 2024-01-19 DIAGNOSIS — D69.6 THROMBOCYTOPENIA (HCC): Primary | ICD-10-CM

## 2024-01-19 PROCEDURE — 99204 OFFICE O/P NEW MOD 45 MIN: CPT | Performed by: NURSE PRACTITIONER

## 2024-01-19 NOTE — PROGRESS NOTES
Hematology/Oncology Outpatient Consult Note  Estella Stein 68 y.o. female MRN: @ Encounter: [id number]        Date:  1/19/2024        CC: Leukopenia, thrombocytopenia      HPI:  Estella Stein is a 68-year-old female with a medical history of hypothyroidism, Sjogren's disease not on medication, autoimmune hepatitis with transaminitis and hyperbilirubinemia.  She follows closely with hepatology, normocytic anemia who is referred to hematology for evaluation of cytopenias and is being seen for initial consultation 1/19/2024     Blood work on file reviewed.  Over the past year, she has had mild intermittent anemia.  On more recent labs, she has had fluctuations in her white count and platelet count.     5/30/23 WBC 4.3, hemoglobin 11.8, platelets 177    6/24/23: WBC 6.47, hemoglobin 10.4, MCV 91, platelets 206    11/6/23: WBC 3.03, hemoglobin 11.4, MCV 90, platelets 123    11/7/23: WBC 2.7, hemoglobin 12.1, platelets 111.  ANC 1.1    12/22/23: WBC 3.3, hemoglobin 11.8, platelet count 80.  ANC 1.2    1/10/24: WBC 3.2, Hgb 11.6, platelets 81, ANC 1.1    11/11/23 MRI abdomen: normal spleen. Mild hepatomegaly, no steatosis.       Patient has undergone liver biopsy in 5/2023.  Pathology demonstrated patchy portal interface hepatitis, patchy periportal fibrosis.  Initially, she was on observation for her elevated LFTs.  She was started on ursodiol at the end of December 2023.    She reports symptoms of dizziness, feelings of brain fog, mild headache/sinus pressure that started almost 2 months ago.  Recent MRI of brain was negative for any acute pathology.  It demonstrated isolated sphenoid sinus disease.  She will be seeing ENT and neurology    She follows with rheumatology for history of Sjogren's.  Has never needed medication management    She denies any recent or recurrent infections.  She was hospitalized in June 2023 in [place] with a bout of pneumonia.  She reports this was the first time she has had  any serious infection in a long while.  No frequent fever/chills.  No unintentional weight loss, no drenching night sweats.  She remains active but does report decreased stamina  Denies any abnormal bleeding or bruising.    She is up-to-date on cancer screenings.    Fam hx:  prostate cancer in dad and brother   Her father lived until 93, her mother lived until 100 years of age      Test Results:    Imaging: MRI brain w wo contrast    Result Date: 1/9/2024  Narrative: MRI BRAIN WITH AND WITHOUT CONTRAST INDICATION: R42: Dizziness and giddiness H54.7: Unspecified visual loss R41.3: Other amnesia. COMPARISON:  None. TECHNIQUE: Multiplanar, multisequence imaging of the brain was performed before and after gadolinium administration. IV Contrast:  8 mL of Gadobutrol injection (SINGLE-DOSE) IMAGE QUALITY:   Diagnostic. FINDINGS: BRAIN PARENCHYMA:  There is no discrete mass, mass effect or midline shift. There is no intracranial hemorrhage.  Normal posterior fossa.  Diffusion imaging is unremarkable. Small scattered hyperintensities on T2/FLAIR imaging are noted in the periventricular and subcortical white matter demonstrating an appearance that is statistically most likely to represent mild microangiopathic change. No solid [id number] accession dilated up to 0 and nondilated Postcontrast imaging of the brain demonstrates no abnormal enhancement. VENTRICLES:  Normal for the patient's age. SELLA AND PITUITARY GLAND:  Normal. ORBITS:  Normal. PARANASAL SINUSES: Mucous retention cysts are seen in the bilateral sphenoid sinuses. VASCULATURE:  Evaluation of the major intracranial vasculature demonstrates appropriate flow voids. CALVARIUM AND SKULL BASE:  Normal. EXTRACRANIAL SOFT TISSUES:  Normal.     Impression: White matter changes suggestive of chronic microangiopathy.  No acute intracranial pathology. Isolated sphenoid sinus disease. Consider outpatient nonemergent ENT consultation. Workstation performed: [id number]        Labs:   Lab Results   Component Value Date    WBC 3.3 (L) 2023    HGB 11.8 2023    HCT 36.9 2023    MCV 92 2023    PLT 80 (LL) 2023     Lab Results   Component Value Date     2014    K 4.1 2023     2023    CO2 26 2023    ANIONGAP 9 2014    BUN 23 2023    CREATININE 0.97 2023    GLUCOSE 90 2014    GLUF 92 2023    CALCIUM 8.5 2023    AST 49 (H) 2023    ALT 62 (H) 2023    ALKPHOS 281 (H) 2023    PROT 7.0 2014    BILITOT 0.4 2014    EGFR 64 2023           ROS:  Review of Systems   Musculoskeletal:  Positive for arthralgias.   Neurological:  Positive for headaches.   All other systems reviewed and are negative.      Active Problems:   Patient Active Problem List   Diagnosis    Elevated liver enzymes    Hypothyroid    Sjogren's disease (HCC)       Past Medical History:   Past Medical History:   Diagnosis Date    Anemia     I stopped taking iron supplements after I went through menop    Disease of thyroid gland     Hypothyroid     Sjogren's disease (HCC)        Surgical History:   Past Surgical History:   Procedure Laterality Date    CARPAL TUNNEL RELEASE       SECTION      COLONOSCOPY      HYSTERECTOMY       approx    IR BIOPSY LIVER RANDOM  2023    LIVER BIOPSY  2017?    OOPHORECTOMY Bilateral     approx 2014    SKIN BIOPSY      TUBAL LIGATION         Family History:    Family History   Problem Relation Age of Onset    Breast cancer Mother         late 80s onset    Arthritis Mother     Hypothyroidism Mother     Vision loss Mother     Prostate cancer Father         diagnosed in his 80s    Asthma Father     Hypothyroidism Father     No Known Problems Daughter     Anemia Maternal Grandmother     No Known Problems Maternal Grandfather     No Known Problems Paternal Grandmother     No Known Problems Paternal Grandfather     Skin cancer Brother     Prostate cancer  Brother 74    Colon polyps Neg Hx     Colon cancer Neg Hx        Cancer-related family history includes Breast cancer in her mother; Prostate cancer in her father; Prostate cancer (age of onset: 74) in her brother; Skin cancer in her brother. There is no history of Colon cancer.    Social History:   Social History     Socioeconomic History    Marital status: /Civil Union     Spouse name: Not on file    Number of children: Not on file    Years of education: Not on file    Highest education level: Not on file   Occupational History    Not on file   Tobacco Use    Smoking status: Former     Current packs/day: 0.00     Average packs/day: 0.3 packs/day for 15.0 years (3.8 ttl pk-yrs)     Types: Cigarettes     Start date: 1971     Quit date: 1979     Years since quittin.6    Smokeless tobacco: Never    Tobacco comments:     I was never a heavy smoker   Vaping Use    Vaping status: Never Used   Substance and Sexual Activity    Alcohol use: Yes     Comment: 1 drink social drinker    Drug use: Never    Sexual activity: Not Currently     Partners: Female     Birth control/protection: Post-menopausal     Comment: when I did use birth control I was on the pill   Other Topics Concern    Not on file   Social History Narrative    Not on file     Social Determinants of Health     Financial Resource Strain: Not on file   Food Insecurity: Not on file   Transportation Needs: Not on file   Physical Activity: Not on file   Stress: Not on file   Social Connections: Not on file   Intimate Partner Violence: Not on file   Housing Stability: Not on file       Current Medications:   Current Outpatient Medications   Medication Sig Dispense Refill    albuterol (PROVENTIL HFA,VENTOLIN HFA) 90 mcg/act inhaler Inhale 2 puffs every 6 (six) hours as needed      Calcium Carbonate-Vit D-Min (Calcium 600+D Plus Minerals) 600-400 MG-UNIT TABS Take 1 tablet by mouth 2 (two) times a day      CALCIUM-VITAMIN D PO Take by mouth       citalopram (CeleXA) 20 mg tablet       fexofenadine (ALLEGRA) 180 MG tablet Take 180 mg by mouth if needed      ibuprofen (MOTRIN) 200 mg tablet Take 400 mg by mouth every 6 (six) hours as needed for mild pain      Multiple Vitamin (multivitamin) tablet Take 1 tablet by mouth daily      NON FORMULARY Garlacin      Omega-3 Fatty Acids (OMEGA-3 FISH OIL PO) Take by mouth      PreviDent 5000 Dry Mouth 1.1 % GEL       Sodium Fluoride 5000 PPM 1.1 % PSTE USE 1-2 TIMES AS INSTRUCTED      Synthroid 75 MCG tablet TAKE 1 TABLET BY MOUTH EVERY DAY IN THE MORNING ON AN EMPTY STOMACH FOR 30 DAYS      TURMERIC PO Take by mouth      ursodiol (ACTIGALL) 300 mg capsule TAKE 1 CAPSULE BY MOUTH THREE TIMES A  capsule 1    imiquimod (ALDARA) 5 % cream Apply sparingly at bedtime 5 nights per week for 6 weeks to affected area of right arm. (Patient not taking: Reported on 11/6/2023) 30 each 0    Synthroid 112 MCG tablet Take 75 mcg by mouth daily (Patient not taking: Reported on 12/21/2023)       No current facility-administered medications for this visit.       Allergies:   Allergies   Allergen Reactions    Other Tongue Swelling     Eggplant - dysphagia    Shellfish-Derived Products - Food Allergy Swelling         Physical Exam:    Body surface area is 1.87 meters squared.    Wt Readings from Last 3 Encounters:   01/19/24 79.8 kg (176 lb)   12/21/23 81.2 kg (179 lb)   11/06/23 80.7 kg (178 lb)        Temp Readings from Last 3 Encounters:   01/19/24 (!) 97.3 °F (36.3 °C) (Temporal)   12/21/23 97.7 °F (36.5 °C) (Tympanic)   11/06/23 98.6 °F (37 °C) (Tympanic)        BP Readings from Last 3 Encounters:   01/19/24 148/98   12/21/23 150/88   11/06/23 157/72         Pulse Readings from Last 3 Encounters:   01/19/24 71   12/21/23 66   11/06/23 96          Physical Exam  Constitutional:       General: She is not in acute distress.     Appearance: Normal appearance.   HENT:      Head: Normocephalic and atraumatic.   Eyes:      General:  No scleral icterus.        Right eye: No discharge.         Left eye: No discharge.      Conjunctiva/sclera: Conjunctivae normal.   Cardiovascular:      Rate and Rhythm: Normal rate and regular rhythm.   Pulmonary:      Effort: Pulmonary effort is normal. No respiratory distress.      Breath sounds: Normal breath sounds.   Abdominal:      General: Bowel sounds are normal. There is no distension.      Palpations: Abdomen is soft. There is no mass.      Tenderness: There is no abdominal tenderness.   Musculoskeletal:         General: Normal range of motion.   Lymphadenopathy:      Cervical: No cervical adenopathy.      Upper Body:      Right upper body: No supraclavicular, axillary or pectoral adenopathy.      Left upper body: No supraclavicular, axillary or pectoral adenopathy.   Skin:     General: Skin is warm and dry.   Neurological:      General: No focal deficit present.      Mental Status: She is alert and oriented to person, place, and time.   Psychiatric:         Mood and Affect: Mood normal.         Behavior: Behavior normal.              Assessment/ Plan:    1. Thrombocytopenia (HCC)    2. Neutropenia, unspecified type (HCC)      Patient is a very pleasant 68-year-old female with a history of autoimmune disease.  She has Sjogren syndrome, autoimmune hepatitis.  She is referred to hematology for evaluation of intermittent neutropenia, worsening thrombocytopenia.  Several etiologies for her cytopenias exist including drug side effect, underlying autoimmune disorder, ITP, MDS.  Patient is on Ursodiol for management of her autoimmune hepatitis.  She started on this 12/26/23. This medication is associated with adverse side effect of leukopenia, thrombocytopenia.  She also has underlying autoimmune disorder, this in itself can result in mild cytopenias.    Fortunately, she is asymptomatic and clinically well-appearing.  I reviewed all possible etiologies with her today and discussed workup for further evaluation  "to include possible bone marrow biopsy.  I explained bone marrow biopsy would be the most diagnostic way to understand what is happening in the bone marrow.  If she were found to have a low-grade MDS, at this point observation would be recommended since her counts are not critically low.  After discussion, we will start with basic routine workup and I will check flow cytometry on peripheral blood, T-cell gene rearrangement studies to evaluate for possible LGL which is associated with autoimmune disorder, MMA, B12 and folate studies to rule out any substrate deficiency that could be contributing to her cytopenias.  Patient is in agreement with this plan of care.  She will have lab studies completed and return for follow-up visit to review results.  I will also repeat CBC prior to her follow-up visit.  Based on results of initial workup, bone marrow biopsy may be recommended.  Patient verbalizes understanding of this.  She is in agreement with our plan of care today.  She is instructed to call back with any questions or concerns prior to her next visit.        Portions of the record may have been created with voice recognition software.  Occasional wrong word or \"sound a like\" substitutions may have occurred due to the inherent limitations of voice recognition software.  Read the chart carefully and recognize, using context, where substitutions have occurred.          "

## 2024-01-25 LAB
LAB DIRECTOR NAME PROVIDER: NORMAL
LAB DIRECTOR NAME PROVIDER: NORMAL
Lab: NORMAL
Lab: NORMAL
REF LAB TEST METHOD: NORMAL
REF LAB TEST METHOD: NORMAL
T CLONAL SIZE: NORMAL
TCRB GENE REAR BLD/T QL: NORMAL
TCRG GENE REAR BLD/T QL: NORMAL

## 2024-02-01 ENCOUNTER — TRANSCRIBE ORDERS (OUTPATIENT)
Dept: GASTROENTEROLOGY | Facility: CLINIC | Age: 69
End: 2024-02-01

## 2024-02-01 ENCOUNTER — OFFICE VISIT (OUTPATIENT)
Dept: GASTROENTEROLOGY | Facility: CLINIC | Age: 69
End: 2024-02-01
Payer: COMMERCIAL

## 2024-02-01 VITALS
WEIGHT: 178.6 LBS | BODY MASS INDEX: 29.76 KG/M2 | TEMPERATURE: 96.6 F | SYSTOLIC BLOOD PRESSURE: 116 MMHG | HEIGHT: 65 IN | DIASTOLIC BLOOD PRESSURE: 74 MMHG

## 2024-02-01 DIAGNOSIS — R79.89 ABNORMAL LIVER FUNCTION TESTS: ICD-10-CM

## 2024-02-01 DIAGNOSIS — K75.81 STEATOHEPATITIS, NON-ALCOHOLIC: ICD-10-CM

## 2024-02-01 DIAGNOSIS — K74.3 PRIMARY BILIARY CHOLANGITIS (HCC): ICD-10-CM

## 2024-02-01 DIAGNOSIS — K75.4 AUTOIMMUNE HEPATITIS (HCC): Primary | ICD-10-CM

## 2024-02-01 DIAGNOSIS — M35.1 OVERLAP SYNDROME (HCC): ICD-10-CM

## 2024-02-01 PROCEDURE — 99213 OFFICE O/P EST LOW 20 MIN: CPT | Performed by: INTERNAL MEDICINE

## 2024-02-01 NOTE — PROGRESS NOTES
[facility] - Outpatient Follow-up Note  Estella Stein 68 y.o. 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    We have been following these labs as they slowly trending down:  most recent labs from 1 weeks ago, slightly improved since starting Michael:  Alk Phos 223, total bilirubin 0.5, AST 25,     Await neurology consultation and then will initiate prednisone 40 mg daily and taper based on response assessment.  We may initiate azathioprine at the onset vs down the road pending response assessment.    Continue monthly blood work for now.    FOLLOW-UP:  Return in about 3 months (around 5/1/2024).     VISIT DIAGNOSES AND ORDERS:      1. Autoimmune hepatitis (HCC)    2. Abnormal liver function tests    3. Steatohepatitis, non-alcoholic    4. Primary biliary cholangitis (HCC)    5. Overlap syndrome (HCC)        No orders of the defined types were placed in this encounter.    ______________________________________________________________________    SUBJECTIVE:       Interval update 02/01/2024:  Since her last office visit, Estella underwent MRI of her brain which showed white matter changes suggestive of chronic microangiopathy without any acute intracranial pathology.  She has noted to have isolated sphenoid sinus disease and nonemergent ENT consultation was requested.    She has followed up with her hematologist/oncologist, Isrrael Oden, 2 weeks ago for leukopenia and thrombocytopenia.  Extensive blood work has been requested and pending results she may require bone marrow biopsy.    She had blood work done on 1/19/2024: Hemoglobin 12.2, platelets 75,000  1/10/2024: Platelets 81,000, creatinine 1.0, sodium 141, potassium 4.6, total protein 6.9, albumin 4.3, alkaline phosphatase 223, total bilirubin 0.5, AST 25, , INR 0.9.    Since her last visit, she started taking  ursodiol 300 milligrams 3 times daily.  We decided to hold off on initiating steroids for presumed autoimmune hepatitis/PBC overlap until her neurologic workup is complete.  She is scheduled to see neurology in 2 weeks time.  She is also seeing ENT in follow-up the same day to review results of her CT sinuses.    In the office today, she states she continues to feel fatigued but otherwise has had no significant change in her health.  She continues to have the vision issues as outlined below.  The dizziness occurs but has improved.  She continues to have word finding problems.    Ms. Stein denies recent or history of yellow eyes/skin, dark urine, GI bleeding, abdominal distention with fluid, lower extremity swelling, easy bruising, excessive bleeding, pruritus or confusion.          History:  Summary from office visit in [facility] on 12/21/23:  Estella Stein is a 68 y.o. female with past medical history significant for hypothyroidism, Sjogren's disease, and chronic elevation in LFTs who presents to hepatology clinic for follow-up of her elevated liver enzymes. Initial plan was to initiate therapy for possible AIH/Overlap Syndrome      Dizziness  Vision problem  Memory change  Patient reports ongoing issues with vision changes. States eyes feel crossed with associated headache, however, no physical exam changes occur when these episodes happen. Has undergone ophtho eval without significant finding. Also endorsing new onset dizziness, feeling off balance, headaches/sinus pressure, and most recently change in memory/intermittent episodes of increased confusion.  Would recommend calling PCP for apt  Will refer to neurology - discussed may take time to get apt  Will order MRI brain today to be reviewed by PCP and neurology in future  Recheck TSH/T4 in setting of hyperthyroid previously and still on synthroid  Discussed if episodes of confusion/memory loss recur - would recommend ED evaluation  for expedited work up  Plan to initiate steroids in future pending above workup     Autoimmune hepatitis (vs. 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 Discussed and reviewed labs and imaging in detail with patient. MRI reviewed personally in PACS.   Plan was to initiated steroids and ursodiol and monitor LFT's  Unfortunately, patient with new neurologic symptoms as detailed above  Will hold off on starting steroids until completed neurologic evaluation  Start Ursodiol 300mg TID now  Check TPMT to consider steroid sparing therapy in future    Interval update 10/25/2023:  Since her last office visit, was briefly hospitalized in [place] with pneumonia.  At that time, she was also noted to be hyperthyroid and her levothyroxine doses were decreased.    Otherwise, her health has been relatively stable.  Her main complaints remain fatigue.  Otherwise, Ms. Stein denies recent or history of yellow eyes/skin, dark urine, GI bleeding, abdominal distention with fluid, lower extremity swelling, easy bruising, excessive bleeding, pruritus or confusion.  She does complain of mildly increased dry skin.  She denies abdominal pain, nausea, vomiting, heartburn, reflux, difficulty swallowing, early satiety, bloating, diarrhea, constipation or straining with passing stools.    She had blood work done twice since her last office visit, showing persistently elevated LFTs are a slow trend downward.    Recent labs we    Summary from office visit 6/6/2023  1. Elevated LFTs  2. Positive AMA   Hepatocellular. Chronic since age 40s. Remote hx of syphillis in the past; had undergone liver bx ~20 years ago which was indeterminate. Was referred to hepatology in May 2023 for AMA +ve, ASMA -ve, elevated AST/ALT. Underwent liver bx 5/23/23 showing patchy portal and interface hepatitis and patchy danielito-portal fibrosis. Repeat LFTs  showing down trending AST and ALT.   - discussed in detail that the given non definitve biopsy results and down trending LFTs, moving forward we have two options, one is to initiate immunosuppressive therapy for AIH or to continue to monitor LFTs serially qmonthly for 3 months;  - she prefers serial monitoring for now which is reasonable  - discussed that if LFTs do not improve or worsen, will recommend initiating treatment    HPI:  68-year-old female with past medical history including but not limited to Sjogren syndrome with Raynaud's phenomenon, Hashimoto's thyroiditis on Synthroid, remote history of syphilis status posttreatment who presents today for follow up for chronic elevation in LFTs.      Since last visit in May 2023, patient underwent liver biopsy for elevated AST/ALT, positive LYNSEY, positive AMA blood work. She underwent liver biopsy 5/30/23 which showed patchy portal and interface hepatitis and patchy danielito-portal fibrosis without evidence of cholestasis. She had repeat blood work obtained 5/18/2023 showing AST 51, ALT 52,  which is down trending from 9/2022, ASMA negative, antimitochondrial antibody 143.      During this visit, she reports feeling well overall. Reports occsaional RUQ abdominal pain which is non-distressing and non specific. Attributes it to the liver biopsy procedure but is tolerated well. She has no other complaints. Denies jaundice, fever, chills, OTC or herbal supplements use, weight loss, blood in stool, heartburn.            REVIEW OF SYSTEMS     Review of Systems   All other systems reviewed and are negative.      Historical Information   Patient Active Problem List   Diagnosis    Elevated liver enzymes    Hypothyroid    Sjogren's disease (HCC)     Social History     Substance and Sexual Activity   Alcohol Use Yes    Comment: 1 drink social drinker     Social History     Substance and Sexual Activity   Drug Use Never     Social History     Tobacco Use   Smoking Status  "Former    Current packs/day: 0.00    Average packs/day: 0.3 packs/day for 15.0 years (3.8 ttl pk-yrs)    Types: Cigarettes    Start date: 1971    Quit date: 1979    Years since quittin.7   Smokeless Tobacco Never   Tobacco Comments    I was never a heavy smoker       Meds/Allergies       Current Outpatient Medications:     albuterol (PROVENTIL HFA,VENTOLIN HFA) 90 mcg/act inhaler    Calcium Carbonate-Vit D-Min (Calcium 600+D Plus Minerals) 600-400 MG-UNIT TABS    CALCIUM-VITAMIN D PO    citalopram (CeleXA) 20 mg tablet    fexofenadine (ALLEGRA) 180 MG tablet    ibuprofen (MOTRIN) 200 mg tablet    Multiple Vitamin (multivitamin) tablet    NON FORMULARY    Omega-3 Fatty Acids (OMEGA-3 FISH OIL PO)    PreviDent 5000 Dry Mouth 1.1 % GEL    Sodium Fluoride 5000 PPM 1.1 % PSTE    Synthroid 75 MCG tablet    TURMERIC PO    ursodiol (ACTIGALL) 300 mg capsule    imiquimod (ALDARA) 5 % cream    Synthroid 112 MCG tablet    Allergies   Allergen Reactions    Other Tongue Swelling     Eggplant - dysphagia    Shellfish-Derived Products - Food Allergy Swelling           Objective     Blood pressure 116/74, temperature (!) 96.6 °F (35.9 °C), temperature source Tympanic, height 5' 5\" (1.651 m), weight 81 kg (178 lb 9.6 oz). Body mass index is 29.72 kg/m².      PHYSICAL EXAM:      Physical Exam  Vitals reviewed.   Constitutional:       General: She is not in acute distress.     Appearance: Normal appearance. She is not ill-appearing.   HENT:      Head: Normocephalic and atraumatic.      Nose: Nose normal.      Mouth/Throat:      Mouth: Mucous membranes are moist.      Pharynx: Oropharynx is clear.   Eyes:      General: No scleral icterus.     Extraocular Movements: Extraocular movements intact.   Cardiovascular:      Rate and Rhythm: Normal rate and regular rhythm.      Heart sounds: No murmur heard.  Pulmonary:      Effort: Pulmonary effort is normal. No respiratory distress.      Breath sounds: Normal breath sounds. " "  Abdominal:      General: Abdomen is flat.      Palpations: Abdomen is soft. There is no shifting dullness, fluid wave, hepatomegaly or splenomegaly.      Tenderness: There is no abdominal tenderness.      Hernia: No hernia is present.   Genitourinary:     Comments: deferred  Musculoskeletal:         General: No swelling. Normal range of motion.      Cervical back: Normal range of motion and neck supple. No tenderness.   Skin:     General: Skin is warm.      Coloration: Skin is not jaundiced.      Findings: No bruising or rash.   Neurological:      General: No focal deficit present.      Mental Status: She is alert and oriented to person, place, and time.   Psychiatric:         Mood and Affect: Mood normal.         Lab Results:   Lab Results   Component Value Date     02/12/2014    K 4.1 12/22/2023    CO2 26 12/22/2023     12/22/2023    BUN 23 12/22/2023    CREATININE 0.97 12/22/2023    GLUCOSE 90 02/12/2014     Lab Results   Component Value Date    WBC 3.3 (L) 12/22/2023    HGB 11.8 12/22/2023    HCT 36.9 12/22/2023    MCV 92 12/22/2023    PLT 80 (LL) 12/22/2023     Lab Results   Component Value Date    TP 6.9 12/22/2023    AST 49 (H) 12/22/2023    ALT 62 (H) 12/22/2023    BILITOT 0.4 02/12/2014    INR 1.0 12/22/2023      Lab Results   Component Value Date    IRON 45 (L) 03/04/2014    LABIRON 16 03/04/2014    FERRITIN 332.4 (H) 03/04/2014     No results found for: \"CHOL\", \"HDL\", \"TRIG\", \"LDL\"      Radiology Results:   Mammo screening bilateral w 3d & cad    Result Date: 10/5/2023  Narrative: DIAGNOSIS: Encounter for screening mammogram for malignant neoplasm of breast TECHNIQUE: Digital screening mammography was performed. Computer Aided Detection (CAD) analyzed all applicable images. COMPARISONS: Prior breast imaging dated: 07/01/2021, 10/25/2018, 07/12/2016, 08/21/2014, 02/15/2013, 10/04/2010, and 12/28/2004 RELEVANT HISTORY: Family Breast Cancer History: History of breast cancer in Mother. Family " Medical History: Family medical history includes breast cancer in mother. Personal History: Hormone history includes birth control. Surgical history includes hysterectomy and oophorectomy. No known relevant medical history. The patient is scheduled in a reminder system for screening mammography. 8-10% of cancers will be missed on mammography. Management of a palpable abnormality must be based on clinical grounds.  Patients will be notified of their results via letter from our facility. Accredited by American College of Rad[employer] and FDA. RISK ASSESSMENT: 5 Year Tyrer-Cuzick: 3.78 % 10 Year Tyrer-Cuzick: 7.09 % Lifetime Tyrer-Cuzick: 13.86 % TISSUE DENSITY: There are scattered areas of fibroglandular density.  INDICATION: Estella Stein is a 68 y.o. female presenting for screening mammography. FINDINGS: There are no suspicious masses, grouped microcalcifications or areas of architectural distortion. The skin and nipple areolar complex are unremarkable.     Impression: No mammographic evidence of malignancy. ASSESSMENT/BI-RADS CATEGORY: Left: 1 - Negative Right: 1 - Negative Overall: 1 - Negative RECOMMENDATION:      - Routine screening mammogram in 1 year for both breasts. Workstation ID: [id number]     DXA bone density spine hip and pelvis    Result Date: 10/5/2023  Narrative: DXA SCAN CLINICAL HISTORY: 68-year-old postmenopausal female. OTHER RISK FACTORS: SSRI therapy. PHARMACOLOGIC THERAPY FOR OSTEOPOROSIS:  None. TECHNIQUE: Bone densitometry was performed using a Hologic Discovery C bone densitometer.  Regions of interest appear properly placed. COMPARISON: 7/1/2021. RESULTS: LUMBAR SPINE Level: L1-L4: BMD: 0.907 gm/cm2 T-score: -1.3 LEFT  TOTAL HIP: BMD: 0.808 gm/cm2 T-score: -1.1 LEFT FEMORAL NECK: BMD: 0.710 gm/cm2 T score: -1.3     Impression: 1. Low bone mass (osteopenia). 2.  Since a DXA study from 7/1/2021, there has been: A  STATISTICALLY SIGNIFICANT INCREASE in bone mineral density of 0.040  g/cm2 (4.6%) in the lumbar spine. 3.  The 10 year risk of hip fracture is 0.7% with the 10 year risk of major osteoporotic fracture being 8.2% as calculated by the [employer] fracture risk assessment tool (FRAX, which is based on data generated by the WHO Collaborating [employer] for Metabolic Bone Diseases). 4.  The current NOF guidelines recommend treating patients with a T-score of -2.5 or less in the lumbar spine or hips, or in post-menopausal women and men over the age of 50 with low bone mass (osteopenia) and a FRAX 10 year risk score of >3% for hip fracture and/or >20% for major osteoporotic fracture. 5.  The NOF recommends follow-up DXA in 1-2 years after initiating therapy for osteoporosis and every 2 years thereafter. More frequent evaluation is appropriate for patients with conditions associated with rapid bone loss, such as glucocorticoid therapy. The interval between DXA screenings may be longer for individuals without major risk factors and initial T-score in the normal or upper low bone mass range. The FRAX algorithm has certain limitations: -FRAX has not been validated in patients currently or previously treated with pharmacotherapy for osteoporosis.  In such patients, clinical judgment must be exercised in interpreting FRAX scores. -Prior hip, vertebral and humeral fragility fractures appear to confer greater risk of subsequent fracture than fractures at other sites (this is especially true for individuals with severe vertebral fractures), but quantification of this incremental risk is not possible with FRAX. -FRAX underestimates fracture risk in patients with history of multiple fragility fractures. -FRAX may underestimate fracture risk in patients with history of frequent falls. -It is not appropriate to use FRAX to monitor treatment response. WHO CLASSIFICATION: Normal (a T-score of -1.0 or higher) Low bone mineral density (a T-score of less than -1.0 but higher than -2.5) Osteoporosis  (a T-score of -2.5 or less) Severe osteoporosis (a T-score of -2.5 or less with a fragility fracture) LEAST SIGNIFICANT CHANGE (AT 95% C.I): Lumbar spine: 0.039 g/cm2; 4.0% Total hip: 0.031 g/cm2; 3.7% Forearm: 0.022 g/cm2; 3.8% Workstation performed: [id number]         Macario Prabhakar MD

## 2024-02-01 NOTE — PATIENT INSTRUCTIONS
Continue with the blood work as previously requested.  Discuss with your doctors (neuro, ENT and hematology) if they have any issue with you being on Prednisone (40 mg, tapered down over a few months) and Azathioprine.

## 2024-02-12 ENCOUNTER — TELEPHONE (OUTPATIENT)
Age: 69
End: 2024-02-12

## 2024-02-12 LAB
ALBUMIN SERPL-MCNC: 4.1 G/DL (ref 3.9–4.9)
ALBUMIN/GLOB SERPL: 1.5 {RATIO} (ref 1.2–2.2)
ALP SERPL-CCNC: 176 IU/L (ref 44–121)
ALT SERPL-CCNC: 55 IU/L (ref 0–32)
AST SERPL-CCNC: 46 IU/L (ref 0–40)
BASOPHILS # BLD AUTO: 0 X10E3/UL (ref 0–0.2)
BASOPHILS NFR BLD AUTO: 1 %
BILIRUB SERPL-MCNC: 0.4 MG/DL (ref 0–1.2)
BUN SERPL-MCNC: 16 MG/DL (ref 8–27)
BUN/CREAT SERPL: 18 (ref 12–28)
CALCIUM SERPL-MCNC: 8.8 MG/DL (ref 8.7–10.3)
CHLORIDE SERPL-SCNC: 103 MMOL/L (ref 96–106)
CO2 SERPL-SCNC: 25 MMOL/L (ref 20–29)
CREAT SERPL-MCNC: 0.87 MG/DL (ref 0.57–1)
EGFR: 73 ML/MIN/1.73
EOSINOPHIL # BLD AUTO: 0.3 X10E3/UL (ref 0–0.4)
EOSINOPHIL NFR BLD AUTO: 8 %
ERYTHROCYTE [DISTWIDTH] IN BLOOD BY AUTOMATED COUNT: 12.5 % (ref 11.7–15.4)
GLOBULIN SER-MCNC: 2.7 G/DL (ref 1.5–4.5)
GLUCOSE SERPL-MCNC: 94 MG/DL (ref 70–99)
HCT VFR BLD AUTO: 31.7 % (ref 34–46.6)
HGB BLD-MCNC: 11.8 G/DL (ref 11.1–15.9)
IMM GRANULOCYTES # BLD: 0 X10E3/UL (ref 0–0.1)
IMM GRANULOCYTES NFR BLD: 1 %
INR PPP: 1 (ref 0.9–1.2)
LYMPHOCYTES # BLD AUTO: 1.4 X10E3/UL (ref 0.7–3.1)
LYMPHOCYTES NFR BLD AUTO: 43 %
MCH RBC QN AUTO: 34.6 PG (ref 26.6–33)
MCHC RBC AUTO-ENTMCNC: 37.2 G/DL (ref 31.5–35.7)
MCV RBC AUTO: 93 FL (ref 79–97)
MONOCYTES # BLD AUTO: 0.3 X10E3/UL (ref 0.1–0.9)
MONOCYTES NFR BLD AUTO: 10 %
MORPHOLOGY BLD-IMP: ABNORMAL
NEUTROPHILS # BLD AUTO: 1.2 X10E3/UL (ref 1.4–7)
NEUTROPHILS NFR BLD AUTO: 37 %
PLATELET # BLD AUTO: 85 X10E3/UL (ref 150–450)
POTASSIUM SERPL-SCNC: 4.4 MMOL/L (ref 3.5–5.2)
PROT SERPL-MCNC: 6.8 G/DL (ref 6–8.5)
PROTHROMBIN TIME: 10.2 SEC (ref 9.1–12)
RBC # BLD AUTO: 3.41 X10E6/UL (ref 3.77–5.28)
REF LAB TEST METHOD: NORMAL
SODIUM SERPL-SCNC: 141 MMOL/L (ref 134–144)
T4 FREE SERPL DIALY-MCNC: 1.2 NG/DL
TEST INTERPRETATION: NORMAL
TPMT RBC-CCNC: 29.7 UNITS/ML RBC
TSH SERPL-ACNC: 3.3 UU/ML
WBC # BLD AUTO: 3.1 X10E3/UL (ref 3.4–10.8)

## 2024-02-12 NOTE — TELEPHONE ENCOUNTER
Called and spoke to the patient regarding changing 2/13 appt to a virtual appt.  Patient is fine with telephone appt or virtual.  I advised her I would call her and leave her a message which it will be.

## 2024-02-13 ENCOUNTER — TELEPHONE (OUTPATIENT)
Age: 69
End: 2024-02-13

## 2024-02-13 ENCOUNTER — TELEMEDICINE (OUTPATIENT)
Age: 69
End: 2024-02-13
Payer: COMMERCIAL

## 2024-02-13 DIAGNOSIS — D69.6 THROMBOCYTOPENIA (HCC): Primary | ICD-10-CM

## 2024-02-13 DIAGNOSIS — D70.8 OTHER NEUTROPENIA (HCC): ICD-10-CM

## 2024-02-13 PROCEDURE — 99214 OFFICE O/P EST MOD 30 MIN: CPT | Performed by: NURSE PRACTITIONER

## 2024-02-13 NOTE — PROGRESS NOTES
HEMATOLOGY / ONCOLOGY CLINIC FOLLOW UP NOTE    Primary Care Provider: Louisa Pendleton DO  Referring Provider:    MRN: [id number]  : 1955    Reason for Encounter: Follow-up for thrombocytopenia, neutropenia     Telemedicine consent    Patient: Estella Stein  Provider: DOLLY Hui  Provider located at [facility] [address]  FIRST FLOOR  [facility] [address] [phone]    The patient was identified by name and date of birth. Estella Stein was informed that this is a telemedicine visit and that the visit is being conducted through the Epic Embedded platform. She agrees to proceed..  My office door was closed. No one else was in the room.  She acknowledged consent and understanding of privacy and security of the video platform. The patient has agreed to participate and understands they can discontinue the visit at any time.    Patient is aware this is a billable service.     Verification of patient location:     Patient is currently located in the [facility]  Patient is currently located in a state in which I am licensed        Interval History: Patient is being seen virtually today due to severe winter weather to review results of workup ordered for thrombocytopenia and neutropenia.  Repeat CBC-D shows persistent leukopenia with a white count of 3.1, hemoglobin stable 11.8, MCV 93, platelet count 85.  ANC 1.2  LFTs remain elevated.  Alk phos 176, AST 46/ALT 55.  He was seen by Dr. Prabhakar 2 weeks ago for her PBC/AIH overlap syndrome and pending neurology eval will likely be started on treatment with a slow prednisone taper and azathioprine.    From a heme perspective, her workup included a flow cytometry on peripheral blood.  This was negative for any circulating malignant cells.  No evidence of immunophenotypic abnormal myeloid maturation, no circulating blasts.  No evidence to suggest a  neoplastic T-cell process.  B12, folate normal  T-cell gene rearrangement studies did result with a positive finding.    T-Cell PCR Interpretation Comment   Comment: POSITIVE: A clonal T-cell receptor gamma (TCRG) population was            detected     She has been following closely with her PCP for management of hypertension.  Her blood pressures have been fluctuating.  She continues to work full-time and states her most concerning symptom is fatigue by the end of the day.  No fever/chills no recent or recurrent infections.  Denies any abnormal bleeding, easy bruising.  No pruritus, lower extremity swelling  She is following with neurology for symptoms of dizziness, feeling off balance, headaches, intermittent feelings of confusion.  She has not had any of the symptoms since December and feels they may have been related to her high blood pressures are now under better control.  Denies any constitutional symptoms.  No B symptoms    REVIEW OF SYSTEMS:  Please note that a 14-point review of systems was performed to include Constitutional, HEENT, Respiratory, CVS, GI, , Musculoskeletal, Integumentary, Neurologic, Rheumatologic, Endocrinologic, Psychiatric, Lymphatic, and Hematologic/Oncologic systems were reviewed and are negative unless otherwise stated in HPI. Positive and negative findings pertinent to this evaluation are incorporated into the history of present illness.      ECOG PS: 0    PROBLEM LIST:  Patient Active Problem List   Diagnosis    Elevated liver enzymes    Hypothyroid    Sjogren's disease (HCC)    Thrombocytopenia (HCC)    Other neutropenia (HCC)       Assessment / Plan:  1. Thrombocytopenia (HCC)    2. Other neutropenia (HCC)      Patient is a very pleasant 68-year-old female with a history of autoimmune disease.  She has Sjogren syndrome, autoimmune hepatitis.  She was referred to hematology for evaluation of intermittent neutropenia, worsening thrombocytopenia.  MRI of abdomen is consistent with  mild hepatomegaly.  No cirrhosis  Patient is on Ursodiol for management of her autoimmune hepatitis.  She started on this 12/26/23. This medication is associated with adverse side effect of leukopenia, thrombocytopenia.  She also has underlying autoimmune disorder, this in itself can result in mild cytopenias.    Workup ordered for her cytopenias resulted with positive T-cell gene rearrangement studies.  No findings of circulating blasts, evidence of abnormal myeloid maturation or findings to suggest a neoplastic T-cell process.    I explained to her today that positive T-cell rearrangement can be associated with a condition called LGL leukemia, this is uncommon disorder and the cause is not clearly known.  However, it does present as neutropenia and most patients are typically asymptomatic.  Often times, it is associated with autoimmune disorders.  Not all patients with LGL leukemia require treatment at the time of diagnosis. Active treatment is typically reserved for patients with symptomatic disease or with evidence of  life-threatening peripheral blood cytopenias:  Severe neutropenia (absolute neutrophil count <500/microL)  Moderate neutropenia (absolute neutrophil count <1000/microL) with recurrent infections   Based on her symptomology and other findings, there is no indication that she has an active LGL process.  I explained that transient clonal proliferations can also occur in the setting of certain viral infections and in other nonmalignant disorders.    She is following closely with hepatology for her presumed PBC/AIH overlap syndrome and will likely be starting on a slow prednisone taper.  I do agree with this recommendation as prednisone is often used to treat autoimmune related thrombocytopenia.      From heme perspective, I am recommending close observation.  I would like to keep an eye on her CBC weekly while she starts on additional treatment for autoimmune hepatitis.  Her platelet count is in a  safe range at this time and hopefully will improve once started on steroids.  I will repeat T-cell gene rearrangement studies in 2 months.    If she demonstrates any worsening cytopenias, I would recommend bone marrow biopsy for further observation.  At this time, I do believe it is reasonable to continue to observe especially while she initiates treatment for AIH    Patient is in agreement with this plan of care.  I will place orders for weekly CBC and additional blood work to be done prior to follow-up with me in 8 weeks.  She is instructed to call anytime with signs or symptoms of bleeding, easy bruising, B symptoms.  Patient verbalized understanding.    I spent 30 minutes on chart review, face to face counseling time, coordination of care and documentation.    Past Medical History:   has a past medical history of Anemia (), Disease of thyroid gland, Hypothyroid, and Sjogren's disease (HCC).    PAST SURGICAL HISTORY:   has a past surgical history that includes Hysterectomy; Oophorectomy (Bilateral); Tubal ligation;  section; Carpal tunnel release; Colonoscopy; Liver biopsy (?); IR biopsy liver random (2023); and Skin biopsy.    CURRENT MEDICATIONS  Current Outpatient Medications   Medication Sig Dispense Refill    albuterol (PROVENTIL HFA,VENTOLIN HFA) 90 mcg/act inhaler Inhale 2 puffs every 6 (six) hours as needed      Calcium Carbonate-Vit D-Min (Calcium 600+D Plus Minerals) 600-400 MG-UNIT TABS Take 1 tablet by mouth 2 (two) times a day      CALCIUM-VITAMIN D PO Take by mouth      citalopram (CeleXA) 20 mg tablet       fexofenadine (ALLEGRA) 180 MG tablet Take 180 mg by mouth if needed      ibuprofen (MOTRIN) 200 mg tablet Take 400 mg by mouth every 6 (six) hours as needed for mild pain      imiquimod (ALDARA) 5 % cream Apply sparingly at bedtime 5 nights per week for 6 weeks to affected area of right arm. (Patient not taking: Reported on 2023) 30 each 0    Multiple Vitamin  (multivitamin) tablet Take 1 tablet by mouth daily      NON FORMULARY Garlacin      Omega-3 Fatty Acids (OMEGA-3 FISH OIL PO) Take by mouth      PreviDent 5000 Dry Mouth 1.1 % GEL       Sodium Fluoride 5000 PPM 1.1 % PSTE USE 1-2 TIMES AS INSTRUCTED      Synthroid 112 MCG tablet Take 75 mcg by mouth daily (Patient not taking: Reported on 12/21/2023)      Synthroid 75 MCG tablet TAKE 1 TABLET BY MOUTH EVERY DAY IN THE MORNING ON AN EMPTY STOMACH FOR 30 DAYS      TURMERIC PO Take by mouth      ursodiol (ACTIGALL) 300 mg capsule TAKE 1 CAPSULE BY MOUTH THREE TIMES A  capsule 1     No current facility-administered medications for this visit.     [unfilled]    SOCIAL HISTORY:   reports that she quit smoking about 44 years ago. Her smoking use included cigarettes. She started smoking about 53 years ago. She has a 3.8 pack-year smoking history. She has never used smokeless tobacco. She reports current alcohol use. She reports that she does not use drugs.     FAMILY HISTORY:  family history includes Anemia in her maternal grandmother; Arthritis in her mother; Asthma in her father; Breast cancer in her mother; Hypothyroidism in her father and mother; No Known Problems in her daughter, maternal grandfather, paternal grandfather, and paternal grandmother; Prostate cancer in her father; Prostate cancer (age of onset: 74) in her brother; Skin cancer in her brother; Vision loss in her mother.     ALLERGIES:  is allergic to other and shellfish-derived products - food allergy.      Physical Exam:  Vital Signs:   Visit Vitals  OB Status Hysterectomy   Smoking Status Former     There is no height or weight on file to calculate BMI.  There is no height or weight on file to calculate BSA.    Physical Exam  Constitutional:       General: She is not in acute distress.     Appearance: She is not toxic-appearing.   HENT:      Head: Normocephalic and atraumatic.   Pulmonary:      Effort: Pulmonary effort is normal. No respiratory  distress.   Musculoskeletal:      Cervical back: Normal range of motion.   Neurological:      General: No focal deficit present.      Mental Status: She is alert and oriented to person, place, and time.   Psychiatric:         Mood and Affect: Mood normal.         Behavior: Behavior normal.         Labs:  Lab Results   Component Value Date    WBC 3.1 (L) 02/06/2024    HGB 11.8 02/06/2024    HCT 31.7 (L) 02/06/2024    MCV 93 02/06/2024    PLT 85 (LL) 02/06/2024     Lab Results   Component Value Date     02/12/2014    SODIUM 141 02/06/2024    K 4.4 02/06/2024     02/06/2024    CO2 25 02/06/2024    ANIONGAP 9 02/12/2014    AGAP 6 11/06/2023    BUN 16 02/06/2024    CREATININE 0.87 02/06/2024    GLUC 94 02/06/2024    GLUF 92 05/30/2023    CALCIUM 8.5 11/06/2023    AST 46 (H) 02/06/2024    ALT 55 (H) 02/06/2024    ALKPHOS 281 (H) 11/06/2023    PROT 7.0 02/12/2014    TP 6.8 02/06/2024    BILITOT 0.4 02/12/2014    TBILI 0.4 02/06/2024    EGFR 73 02/06/2024

## 2024-02-13 NOTE — TELEPHONE ENCOUNTER
Left message for Estella to return call to the office to schedule a 2 month follow up appointment with Lilian Oden.  Provided office telephone number.

## 2024-02-14 ENCOUNTER — CONSULT (OUTPATIENT)
Dept: NEUROLOGY | Facility: CLINIC | Age: 69
End: 2024-02-14
Payer: COMMERCIAL

## 2024-02-14 VITALS
SYSTOLIC BLOOD PRESSURE: 130 MMHG | BODY MASS INDEX: 29.49 KG/M2 | WEIGHT: 177 LBS | HEART RATE: 70 BPM | DIASTOLIC BLOOD PRESSURE: 80 MMHG | HEIGHT: 65 IN

## 2024-02-14 DIAGNOSIS — R41.3 MEMORY CHANGE: ICD-10-CM

## 2024-02-14 DIAGNOSIS — H54.7 VISION PROBLEM: ICD-10-CM

## 2024-02-14 DIAGNOSIS — R42 DIZZINESS: ICD-10-CM

## 2024-02-14 DIAGNOSIS — G43.109 OCULAR MIGRAINE: Primary | ICD-10-CM

## 2024-02-14 PROCEDURE — 99204 OFFICE O/P NEW MOD 45 MIN: CPT | Performed by: PSYCHIATRY & NEUROLOGY

## 2024-02-14 NOTE — PROGRESS NOTES
Patient ID: Estella Stein is a 68 y.o. female.    Assessment/Plan:    This is a 69 y/o  Female who is here as a new patient to establish care with us.  Patient has been vision changes, and memory changes and patient has been having these visual obscurations without any headache, and these are consistent with ocular migraine.     PLAN:      Diagnoses and all orders for this visit:    Ocular migraine  -patient does not need any medications  -continue with current medications  -no need for preventative at this time.     Dizziness  -     Ambulatory Referral to Neurology    Vision problem  -     Ambulatory Referral to Neurology    Memory change  -likely age related  -hold off on any workup/treatment   -     Ambulatory Referral to Neurology       Follow up in 3-4 months     I would be happy to see the patient sooner if any new questions/concerns arise.  Patient/Guardian was advised to the call the office if they have any questions and concerns in the meantime.     Patient/Guardian does understand that if they have any new stroke like symptoms such as facial droop on one side, weakness/paralysis on either side, speech trouble, numbness on one side, balance issues, any vision changes, extreme dizziness or any new headache, to call 9-1-1 immediately or to proceed to the nearest ER immediately.      I have spent a total time of 40 minutes on 02/14/24 in caring for this patient including Risks and benefits of tx options, Instructions for management, Patient and family education, Counseling / Coordination of care, Documenting in the medical record, and Reviewing / ordering tests, medicine, procedures  .     Subjective:    Dizziness  Associated symptoms include fatigue and headaches. Pertinent negatives include no fever, myalgias, nausea, neck pain, numbness, rash, vomiting or weakness.   Headache    This is a 69 y/o F who is here as a new patient to establish care with us    Patient says that she has some issues  with hypothyroidism, and then was diagnosed with Sjogrens Syndrome, and she was then having elevation in LFTs, and then was seeing hepatologist, and then they have been following up with the LFTs, and they have been elevated consistently.    She says that she has been having issues with her eye, and they became cross eyed, but she says she has double vision, and it can last for 45 seconds. She did not always have headaches. She does not have any warning, and she has them once a day. She does not feel bad afterwards. She is on the computer a lot, and she says that if she opens her eyes really wide, but they are not painful. She says that she gets a little dizzy if she is up moving forward. She has seen multiple ophthalmologists and they did not know, normal eye exam.     She says that she has been having headaches now daily for 3 months, since 2023. She says that her headache was pressure/pulsating, and dull. And she says that sore/stiff neck, and eye redness, ringing in her ears, lightheadedness/dizziness. Sensitive to sound.     The following portions of the patient's history were reviewed and updated as appropriate: She  has a past medical history of Anemia (), Disease of thyroid gland, Hypothyroid, and Sjogren's disease (HCC).  She   Patient Active Problem List    Diagnosis Date Noted    Ocular migraine 2024    Thrombocytopenia (HCC) 2024    Other neutropenia (HCC) 2024    Hypothyroid     Sjogren's disease (HCC)     Elevated liver enzymes 2022     She  has a past surgical history that includes Hysterectomy; Oophorectomy (Bilateral); Tubal ligation;  section; Carpal tunnel release; Colonoscopy; Liver biopsy (2017?); IR biopsy liver random (2023); and Skin biopsy.  Her family history includes Anemia in her maternal grandmother; Arthritis in her mother; Asthma in her father; Breast cancer in her mother; Hypothyroidism in her father and mother; No Known Problems in  her daughter, maternal grandfather, paternal grandfather, and paternal grandmother; Prostate cancer in her father; Prostate cancer (age of onset: 74) in her brother; Skin cancer in her brother; Vision loss in her mother.  She  reports that she quit smoking about 44 years ago. Her smoking use included cigarettes. She started smoking about 53 years ago. She has a 3.8 pack-year smoking history. She has never used smokeless tobacco. She reports current alcohol use. She reports that she does not use drugs.  Current Outpatient Medications   Medication Sig Dispense Refill    albuterol (PROVENTIL HFA,VENTOLIN HFA) 90 mcg/act inhaler Inhale 2 puffs every 6 (six) hours as needed      Calcium Carbonate-Vit D-Min (Calcium 600+D Plus Minerals) 600-400 MG-UNIT TABS Take 1 tablet by mouth 2 (two) times a day      CALCIUM-VITAMIN D PO Take by mouth      citalopram (CeleXA) 20 mg tablet       fexofenadine (ALLEGRA) 180 MG tablet Take 180 mg by mouth if needed      ibuprofen (MOTRIN) 200 mg tablet Take 400 mg by mouth every 6 (six) hours as needed for mild pain      Multiple Vitamin (multivitamin) tablet Take 1 tablet by mouth daily      NON FORMULARY Garlacin      Omega-3 Fatty Acids (OMEGA-3 FISH OIL PO) Take by mouth      PreviDent 5000 Dry Mouth 1.1 % GEL       Sodium Fluoride 5000 PPM 1.1 % PSTE USE 1-2 TIMES AS INSTRUCTED      Synthroid 75 MCG tablet TAKE 1 TABLET BY MOUTH EVERY DAY IN THE MORNING ON AN EMPTY STOMACH FOR 30 DAYS      TURMERIC PO Take by mouth      ursodiol (ACTIGALL) 300 mg capsule TAKE 1 CAPSULE BY MOUTH THREE TIMES A  capsule 1    imiquimod (ALDARA) 5 % cream Apply sparingly at bedtime 5 nights per week for 6 weeks to affected area of right arm. 30 each 0    Synthroid 112 MCG tablet Take 75 mcg by mouth daily (Patient not taking: Reported on 2/14/2024)       No current facility-administered medications for this visit.     Current Outpatient Medications on File Prior to Visit   Medication Sig     "albuterol (PROVENTIL HFA,VENTOLIN HFA) 90 mcg/act inhaler Inhale 2 puffs every 6 (six) hours as needed    Calcium Carbonate-Vit D-Min (Calcium 600+D Plus Minerals) 600-400 MG-UNIT TABS Take 1 tablet by mouth 2 (two) times a day    CALCIUM-VITAMIN D PO Take by mouth    citalopram (CeleXA) 20 mg tablet     fexofenadine (ALLEGRA) 180 MG tablet Take 180 mg by mouth if needed    ibuprofen (MOTRIN) 200 mg tablet Take 400 mg by mouth every 6 (six) hours as needed for mild pain    Multiple Vitamin (multivitamin) tablet Take 1 tablet by mouth daily    NON FORMULARY Garlacin    Omega-3 Fatty Acids (OMEGA-3 FISH OIL PO) Take by mouth    PreviDent 5000 Dry Mouth 1.1 % GEL     Sodium Fluoride 5000 PPM 1.1 % PSTE USE 1-2 TIMES AS INSTRUCTED    Synthroid 75 MCG tablet TAKE 1 TABLET BY MOUTH EVERY DAY IN THE MORNING ON AN EMPTY STOMACH FOR 30 DAYS    TURMERIC PO Take by mouth    ursodiol (ACTIGALL) 300 mg capsule TAKE 1 CAPSULE BY MOUTH THREE TIMES A DAY    imiquimod (ALDARA) 5 % cream Apply sparingly at bedtime 5 nights per week for 6 weeks to affected area of right arm.    Synthroid 112 MCG tablet Take 75 mcg by mouth daily (Patient not taking: Reported on 2/14/2024)     No current facility-administered medications on file prior to visit.     She is allergic to other and shellfish-derived products - food allergy..    Objective:    Blood pressure 130/80, pulse 70, height 5' 5\" (1.651 m), weight 80.3 kg (177 lb).    Physical Exam  General - patient is alert   Speech - no dysarthria noted, no aphasia noted.     Neuro:   Cranial nerves: PERRL, EOMI, facial sensation intact to soft touch in V1, V2 and V3, no facial asymmetry noted, uvula/palate midline, tongue midline.   Motor: 5/5 throughout, normal tone, no pronator drift noted.   Sensory - intact to soft touch throughout  Reflexes - 2+ throughout  Coordination - no ataxia/dysmetria noted  Gait - normal      ROS:  Reviewed ROS   Review of Systems   Constitutional:  Positive for " fatigue. Negative for appetite change and fever.   HENT: Negative.  Negative for hearing loss, tinnitus, trouble swallowing and voice change.    Eyes:  Positive for visual disturbance. Negative for photophobia and pain.   Respiratory: Negative.  Negative for shortness of breath.    Cardiovascular: Negative.  Negative for palpitations.   Gastrointestinal: Negative.  Negative for nausea and vomiting.   Endocrine: Negative.  Negative for cold intolerance.   Genitourinary: Negative.  Negative for dysuria, frequency and urgency.   Musculoskeletal:  Negative for back pain, gait problem, myalgias, neck pain and neck stiffness.   Skin: Negative.  Negative for rash.   Allergic/Immunologic: Negative.    Neurological:  Positive for dizziness and headaches. Negative for tremors, seizures, syncope, facial asymmetry, speech difficulty, weakness, light-headedness and numbness.        Patient stated that she has headaches daily.   Hematological: Negative.  Does not bruise/bleed easily.   Psychiatric/Behavioral: Negative.  Negative for confusion, hallucinations and sleep disturbance.

## 2024-02-15 ENCOUNTER — PATIENT MESSAGE (OUTPATIENT)
Dept: GASTROENTEROLOGY | Facility: CLINIC | Age: 69
End: 2024-02-15

## 2024-02-15 ENCOUNTER — HOSPITAL ENCOUNTER (OUTPATIENT)
Dept: CT IMAGING | Facility: HOSPITAL | Age: 69
Discharge: HOME | End: 2024-02-15
Attending: OTOLARYNGOLOGY
Payer: COMMERCIAL

## 2024-02-15 DIAGNOSIS — K75.4 AUTOIMMUNE HEPATITIS (HCC): Primary | ICD-10-CM

## 2024-02-15 DIAGNOSIS — J32.3 CHRONIC SPHENOIDAL SINUSITIS: ICD-10-CM

## 2024-02-15 PROCEDURE — 70486 CT MAXILLOFACIAL W/O DYE: CPT

## 2024-02-21 ENCOUNTER — TELEPHONE (OUTPATIENT)
Dept: NEUROLOGY | Facility: CLINIC | Age: 69
End: 2024-02-21

## 2024-02-21 NOTE — TELEPHONE ENCOUNTER
[facility] is asking for the last office notes for patient to be faxed to [phone].

## 2024-02-26 LAB
ALBUMIN SERPL-MCNC: 4.3 G/DL (ref 3.9–4.9)
ALBUMIN/GLOB SERPL: 1.5 {RATIO} (ref 1.2–2.2)
ALP SERPL-CCNC: 205 IU/L (ref 44–121)
ALT SERPL-CCNC: 54 IU/L (ref 0–32)
AST SERPL-CCNC: 43 IU/L (ref 0–40)
BASOPHILS # BLD AUTO: 0.1 X10E3/UL (ref 0–0.2)
BASOPHILS NFR BLD AUTO: 1 %
BILIRUB SERPL-MCNC: 0.4 MG/DL (ref 0–1.2)
BUN SERPL-MCNC: 18 MG/DL (ref 8–27)
BUN/CREAT SERPL: 20 (ref 12–28)
CALCIUM SERPL-MCNC: 9 MG/DL (ref 8.7–10.3)
CHLORIDE SERPL-SCNC: 101 MMOL/L (ref 96–106)
CO2 SERPL-SCNC: 25 MMOL/L (ref 20–29)
CREAT SERPL-MCNC: 0.92 MG/DL (ref 0.57–1)
EGFR: 69 ML/MIN/1.73
EOSINOPHIL # BLD AUTO: 0.3 X10E3/UL (ref 0–0.4)
EOSINOPHIL NFR BLD AUTO: 6 %
ERYTHROCYTE [DISTWIDTH] IN BLOOD BY AUTOMATED COUNT: 12.1 % (ref 11.7–15.4)
GLOBULIN SER-MCNC: 2.8 G/DL (ref 1.5–4.5)
GLUCOSE SERPL-MCNC: 74 MG/DL (ref 70–99)
HCT VFR BLD AUTO: 34.7 % (ref 34–46.6)
HGB BLD-MCNC: 11.8 G/DL (ref 11.1–15.9)
IMM GRANULOCYTES # BLD: 0 X10E3/UL (ref 0–0.1)
IMM GRANULOCYTES NFR BLD: 1 %
INR PPP: 1 (ref 0.9–1.2)
LYMPHOCYTES # BLD AUTO: 1.7 X10E3/UL (ref 0.7–3.1)
LYMPHOCYTES NFR BLD AUTO: 33 %
MCH RBC QN AUTO: 30.4 PG (ref 26.6–33)
MCHC RBC AUTO-ENTMCNC: 34 G/DL (ref 31.5–35.7)
MCV RBC AUTO: 89 FL (ref 79–97)
MONOCYTES # BLD AUTO: 0.4 X10E3/UL (ref 0.1–0.9)
MONOCYTES NFR BLD AUTO: 8 %
MORPHOLOGY BLD-IMP: ABNORMAL
NEUTROPHILS # BLD AUTO: 2.6 X10E3/UL (ref 1.4–7)
NEUTROPHILS NFR BLD AUTO: 51 %
PLATELET # BLD AUTO: 102 X10E3/UL (ref 150–450)
POTASSIUM SERPL-SCNC: 4.5 MMOL/L (ref 3.5–5.2)
PROT SERPL-MCNC: 7.1 G/DL (ref 6–8.5)
PROTHROMBIN TIME: 10.2 SEC (ref 9.1–12)
RBC # BLD AUTO: 3.88 X10E6/UL (ref 3.77–5.28)
REF LAB TEST METHOD: NORMAL
SODIUM SERPL-SCNC: 138 MMOL/L (ref 134–144)
T4 FREE SERPL DIALY-MCNC: 1.2 NG/DL
TEST INTERPRETATION: NORMAL
TPMT RBC-CCNC: 25.4 UNITS/ML RBC
TSH SERPL-ACNC: 3 UU/ML
WBC # BLD AUTO: 5 X10E3/UL (ref 3.4–10.8)

## 2024-03-01 ENCOUNTER — TELEPHONE (OUTPATIENT)
Age: 69
End: 2024-03-01

## 2024-03-01 DIAGNOSIS — K75.4 AUTOIMMUNE HEPATITIS (HCC): Primary | ICD-10-CM

## 2024-03-01 DIAGNOSIS — K74.3 PRIMARY BILIARY CHOLANGITIS (HCC): ICD-10-CM

## 2024-03-01 RX ORDER — OMEPRAZOLE 20 MG/1
20 CAPSULE, DELAYED RELEASE ORAL DAILY
Qty: 30 CAPSULE | Refills: 2 | Status: SHIPPED | OUTPATIENT
Start: 2024-03-01 | End: 2024-05-30

## 2024-03-01 RX ORDER — PREDNISONE 20 MG/1
20 TABLET ORAL DAILY
Qty: 30 TABLET | Refills: 3 | Status: SHIPPED | OUTPATIENT
Start: 2024-03-01 | End: 2024-06-29

## 2024-03-01 NOTE — TELEPHONE ENCOUNTER
Patients GI provider:  Dr. Macario Prabhakar    Number to return call: [phone]    Reason for call: Pt calling to speak with Dr. Prabhakar. Pt states blood work is complete and would like to speak with the Dr about medication. Pt also states that she is now taking Valsartan 160 ml.      Scheduled procedure/appointment date if applicable: Apt 5/7/24

## 2024-03-01 NOTE — TELEPHONE ENCOUNTER
I called Ms. Stein and discussed labs and plan.    Will start prednisone 20 mg daily and check labs every 2 weeks.  If transaminases respond nicely, will start Imuran and slowly wean off prednisone.  Will also start low dose omeprazole while on prednisone.    Macario Prabhakar MD  Hepatology/GI

## 2024-03-13 LAB
ALBUMIN SERPL-MCNC: 3.9 G/DL (ref 3.9–4.9)
ALBUMIN/GLOB SERPL: 1.4 {RATIO} (ref 1.2–2.2)
ALP SERPL-CCNC: 165 IU/L (ref 44–121)
ALT SERPL-CCNC: 96 IU/L (ref 0–32)
AST SERPL-CCNC: 50 IU/L (ref 0–40)
BASOPHILS # BLD AUTO: 0 X10E3/UL (ref 0–0.2)
BASOPHILS NFR BLD AUTO: 1 %
BILIRUB SERPL-MCNC: 0.4 MG/DL (ref 0–1.2)
BUN SERPL-MCNC: 19 MG/DL (ref 8–27)
BUN/CREAT SERPL: 19 (ref 12–28)
CALCIUM SERPL-MCNC: 9.2 MG/DL (ref 8.7–10.3)
CHLORIDE SERPL-SCNC: 102 MMOL/L (ref 96–106)
CO2 SERPL-SCNC: 25 MMOL/L (ref 20–29)
CREAT SERPL-MCNC: 0.99 MG/DL (ref 0.57–1)
EGFR: 63 ML/MIN/1.73
EOSINOPHIL # BLD AUTO: 0.2 X10E3/UL (ref 0–0.4)
EOSINOPHIL NFR BLD AUTO: 3 %
ERYTHROCYTE [DISTWIDTH] IN BLOOD BY AUTOMATED COUNT: 12.3 % (ref 11.7–15.4)
GLOBULIN SER-MCNC: 2.8 G/DL (ref 1.5–4.5)
GLUCOSE SERPL-MCNC: 90 MG/DL (ref 70–99)
HCT VFR BLD AUTO: 31.9 % (ref 34–46.6)
HGB BLD-MCNC: 11.8 G/DL (ref 11.1–15.9)
IMM GRANULOCYTES # BLD: 0 X10E3/UL (ref 0–0.1)
IMM GRANULOCYTES NFR BLD: 0 %
INR PPP: 0.9 (ref 0.9–1.2)
LYMPHOCYTES # BLD AUTO: 2.4 X10E3/UL (ref 0.7–3.1)
LYMPHOCYTES NFR BLD AUTO: 47 %
MCH RBC QN AUTO: 34.3 PG (ref 26.6–33)
MCHC RBC AUTO-ENTMCNC: 37 G/DL (ref 31.5–35.7)
MCV RBC AUTO: 93 FL (ref 79–97)
MONOCYTES # BLD AUTO: 0.5 X10E3/UL (ref 0.1–0.9)
MONOCYTES NFR BLD AUTO: 9 %
MORPHOLOGY BLD-IMP: ABNORMAL
NEUTROPHILS # BLD AUTO: 2.1 X10E3/UL (ref 1.4–7)
NEUTROPHILS NFR BLD AUTO: 40 %
PLATELET # BLD AUTO: 93 X10E3/UL (ref 150–450)
POTASSIUM SERPL-SCNC: 4 MMOL/L (ref 3.5–5.2)
PROT SERPL-MCNC: 6.7 G/DL (ref 6–8.5)
PROTHROMBIN TIME: 10.1 SEC (ref 9.1–12)
RBC # BLD AUTO: 3.44 X10E6/UL (ref 3.77–5.28)
REF LAB TEST METHOD: NORMAL
SODIUM SERPL-SCNC: 139 MMOL/L (ref 134–144)
T4 FREE SERPL DIALY-MCNC: 1.1 NG/DL
TEST INTERPRETATION: NORMAL
TPMT RBC-CCNC: 27.5 UNITS/ML RBC
TSH SERPL-ACNC: 8.4 UU/ML
WBC # BLD AUTO: 5.2 X10E3/UL (ref 3.4–10.8)

## 2024-03-27 LAB
ALBUMIN SERPL-MCNC: 3.8 G/DL (ref 3.9–4.9)
ALBUMIN/GLOB SERPL: 1.5 {RATIO} (ref 1.2–2.2)
ALP SERPL-CCNC: 115 IU/L (ref 44–121)
ALT SERPL-CCNC: 99 IU/L (ref 0–32)
AST SERPL-CCNC: 48 IU/L (ref 0–40)
BASOPHILS # BLD AUTO: 0 X10E3/UL (ref 0–0.2)
BASOPHILS NFR BLD AUTO: 1 %
BILIRUB SERPL-MCNC: 0.3 MG/DL (ref 0–1.2)
BUN SERPL-MCNC: 21 MG/DL (ref 8–27)
BUN/CREAT SERPL: 23 (ref 12–28)
CALCIUM SERPL-MCNC: 8.9 MG/DL (ref 8.7–10.3)
CHLORIDE SERPL-SCNC: 104 MMOL/L (ref 96–106)
CO2 SERPL-SCNC: 25 MMOL/L (ref 20–29)
CREAT SERPL-MCNC: 0.9 MG/DL (ref 0.57–1)
EGFR: 70 ML/MIN/1.73
EOSINOPHIL # BLD AUTO: 0.2 X10E3/UL (ref 0–0.4)
EOSINOPHIL NFR BLD AUTO: 3 %
ERYTHROCYTE [DISTWIDTH] IN BLOOD BY AUTOMATED COUNT: 12.5 % (ref 11.7–15.4)
GLOBULIN SER-MCNC: 2.6 G/DL (ref 1.5–4.5)
GLUCOSE SERPL-MCNC: 90 MG/DL (ref 70–99)
HCT VFR BLD AUTO: 32.7 % (ref 34–46.6)
HGB BLD-MCNC: 12.3 G/DL (ref 11.1–15.9)
IMM GRANULOCYTES # BLD: 0 X10E3/UL (ref 0–0.1)
IMM GRANULOCYTES NFR BLD: 1 %
INR PPP: 1 (ref 0.9–1.2)
LYMPHOCYTES # BLD AUTO: 2.6 X10E3/UL (ref 0.7–3.1)
LYMPHOCYTES NFR BLD AUTO: 44 %
MCH RBC QN AUTO: 34.5 PG (ref 26.6–33)
MCHC RBC AUTO-ENTMCNC: 37.6 G/DL (ref 31.5–35.7)
MCV RBC AUTO: 92 FL (ref 79–97)
MONOCYTES # BLD AUTO: 0.4 X10E3/UL (ref 0.1–0.9)
MONOCYTES NFR BLD AUTO: 7 %
MORPHOLOGY BLD-IMP: ABNORMAL
NEUTROPHILS # BLD AUTO: 2.7 X10E3/UL (ref 1.4–7)
NEUTROPHILS NFR BLD AUTO: 44 %
PLATELET # BLD AUTO: 97 X10E3/UL (ref 150–450)
POTASSIUM SERPL-SCNC: 4 MMOL/L (ref 3.5–5.2)
PROT SERPL-MCNC: 6.4 G/DL (ref 6–8.5)
PROTHROMBIN TIME: 10.3 SEC (ref 9.1–12)
RBC # BLD AUTO: 3.57 X10E6/UL (ref 3.77–5.28)
REF LAB TEST METHOD: NORMAL
SODIUM SERPL-SCNC: 139 MMOL/L (ref 134–144)
T4 FREE SERPL DIALY-MCNC: 0.86 NG/DL
TEST INTERPRETATION: NORMAL
TPMT RBC-CCNC: 30 UNITS/ML RBC
TSH SERPL-ACNC: 16 UU/ML
WBC # BLD AUTO: 6 X10E3/UL (ref 3.4–10.8)

## 2024-04-10 LAB
ALBUMIN SERPL-MCNC: 3.9 G/DL (ref 3.9–4.9)
ALBUMIN/GLOB SERPL: 1.6 {RATIO} (ref 1.2–2.2)
ALP SERPL-CCNC: 88 IU/L (ref 44–121)
ALT SERPL-CCNC: 43 IU/L (ref 0–32)
AST SERPL-CCNC: 29 IU/L (ref 0–40)
BASOPHILS # BLD AUTO: 0 X10E3/UL (ref 0–0.2)
BASOPHILS NFR BLD AUTO: 1 %
BILIRUB SERPL-MCNC: 0.5 MG/DL (ref 0–1.2)
BUN SERPL-MCNC: 25 MG/DL (ref 8–27)
BUN/CREAT SERPL: 27 (ref 12–28)
CALCIUM SERPL-MCNC: 8.9 MG/DL (ref 8.7–10.3)
CHLORIDE SERPL-SCNC: 105 MMOL/L (ref 96–106)
CO2 SERPL-SCNC: 22 MMOL/L (ref 20–29)
CREAT SERPL-MCNC: 0.91 MG/DL (ref 0.57–1)
EGFR: 69 ML/MIN/1.73
EOSINOPHIL # BLD AUTO: 0.1 X10E3/UL (ref 0–0.4)
EOSINOPHIL NFR BLD AUTO: 2 %
ERYTHROCYTE [DISTWIDTH] IN BLOOD BY AUTOMATED COUNT: 12.4 % (ref 11.7–15.4)
GLOBULIN SER-MCNC: 2.4 G/DL (ref 1.5–4.5)
GLUCOSE SERPL-MCNC: 88 MG/DL (ref 70–99)
HCT VFR BLD AUTO: 32.4 % (ref 34–46.6)
HGB BLD-MCNC: 11.9 G/DL (ref 11.1–15.9)
IMM GRANULOCYTES # BLD: 0 X10E3/UL (ref 0–0.1)
IMM GRANULOCYTES NFR BLD: 1 %
INR PPP: 1 (ref 0.9–1.2)
LYMPHOCYTES # BLD AUTO: 2.4 X10E3/UL (ref 0.7–3.1)
LYMPHOCYTES NFR BLD AUTO: 39 %
MCH RBC QN AUTO: 34.3 PG (ref 26.6–33)
MCHC RBC AUTO-ENTMCNC: 36.7 G/DL (ref 31.5–35.7)
MCV RBC AUTO: 93 FL (ref 79–97)
MONOCYTES # BLD AUTO: 0.5 X10E3/UL (ref 0.1–0.9)
MONOCYTES NFR BLD AUTO: 7 %
NEUTROPHILS # BLD AUTO: 3.1 X10E3/UL (ref 1.4–7)
NEUTROPHILS NFR BLD AUTO: 50 %
PLATELET # BLD AUTO: 119 X10E3/UL (ref 150–450)
POTASSIUM SERPL-SCNC: 4 MMOL/L (ref 3.5–5.2)
PROT SERPL-MCNC: 6.3 G/DL (ref 6–8.5)
PROTHROMBIN TIME: 10.3 SEC (ref 9.1–12)
RBC # BLD AUTO: 3.47 X10E6/UL (ref 3.77–5.28)
REF LAB TEST METHOD: NORMAL
SODIUM SERPL-SCNC: 142 MMOL/L (ref 134–144)
T4 FREE SERPL DIALY-MCNC: 1.2 NG/DL
TEST INTERPRETATION: NORMAL
TPMT RBC-CCNC: 30.4 UNITS/ML RBC
TSH SERPL-ACNC: 19 UU/ML
WBC # BLD AUTO: 6.1 X10E3/UL (ref 3.4–10.8)

## 2024-04-12 ENCOUNTER — TELEPHONE (OUTPATIENT)
Age: 69
End: 2024-04-12

## 2024-04-12 NOTE — TELEPHONE ENCOUNTER
Pt calling to go over her upcoming lab work order, was able to go thru them with her and she is all set.

## 2024-04-13 ENCOUNTER — APPOINTMENT (EMERGENCY)
Dept: CT IMAGING | Facility: HOSPITAL | Age: 69
DRG: 194 | End: 2024-04-13
Payer: MEDICARE

## 2024-04-13 ENCOUNTER — HOSPITAL ENCOUNTER (INPATIENT)
Facility: HOSPITAL | Age: 69
LOS: 2 days | Discharge: HOME | DRG: 194 | End: 2024-04-15
Attending: EMERGENCY MEDICINE | Admitting: INTERNAL MEDICINE
Payer: MEDICARE

## 2024-04-13 ENCOUNTER — APPOINTMENT (EMERGENCY)
Dept: RADIOLOGY | Facility: HOSPITAL | Age: 69
DRG: 194 | End: 2024-04-13
Payer: MEDICARE

## 2024-04-13 DIAGNOSIS — J18.9 PNEUMONIA: ICD-10-CM

## 2024-04-13 DIAGNOSIS — R79.89 ELEVATED TROPONIN: Primary | ICD-10-CM

## 2024-04-13 DIAGNOSIS — M35.00 SJOGREN'S SYNDROME, WITH UNSPECIFIED ORGAN INVOLVEMENT (HCC): ICD-10-CM

## 2024-04-13 DIAGNOSIS — R07.81 PLEURITIC CHEST PAIN: ICD-10-CM

## 2024-04-13 DIAGNOSIS — R74.8 ELEVATED LIVER ENZYMES: ICD-10-CM

## 2024-04-13 DIAGNOSIS — J18.9 PNEUMONIA OF RIGHT MIDDLE LOBE DUE TO INFECTIOUS ORGANISM: ICD-10-CM

## 2024-04-13 PROBLEM — K75.4 AUTOIMMUNE HEPATITIS (HCC): Status: ACTIVE | Noted: 2024-04-13

## 2024-04-13 LAB
2HR DELTA HS TROPONIN: -141 NG/L
4HR DELTA HS TROPONIN: -67 NG/L
ALBUMIN SERPL BCP-MCNC: 4.3 G/DL (ref 3.5–5)
ALP SERPL-CCNC: 113 U/L (ref 34–104)
ALT SERPL W P-5'-P-CCNC: 93 U/L (ref 7–52)
ANION GAP SERPL CALCULATED.3IONS-SCNC: 9 MMOL/L (ref 4–13)
APTT PPP: 27 SECONDS (ref 23–37)
AST SERPL W P-5'-P-CCNC: 55 U/L (ref 13–39)
BACTERIA UR QL AUTO: ABNORMAL /HPF
BASOPHILS # BLD AUTO: 0.06 THOUSANDS/ÂΜL (ref 0–0.1)
BASOPHILS NFR BLD AUTO: 0 % (ref 0–1)
BILIRUB SERPL-MCNC: 0.86 MG/DL (ref 0.2–1)
BILIRUB UR QL STRIP: NEGATIVE
BNP SERPL-MCNC: 88 PG/ML (ref 0–100)
BUN SERPL-MCNC: 21 MG/DL (ref 5–25)
CALCIUM SERPL-MCNC: 9.4 MG/DL (ref 8.4–10.2)
CARDIAC TROPONIN I PNL SERPL HS: 1227 NG/L
CARDIAC TROPONIN I PNL SERPL HS: 1301 NG/L
CARDIAC TROPONIN I PNL SERPL HS: 1368 NG/L
CHLORIDE SERPL-SCNC: 102 MMOL/L (ref 96–108)
CLARITY UR: CLEAR
CO2 SERPL-SCNC: 27 MMOL/L (ref 21–32)
COLOR UR: YELLOW
CREAT SERPL-MCNC: 0.93 MG/DL (ref 0.6–1.3)
D DIMER PPP FEU-MCNC: 0.68 UG/ML FEU
EOSINOPHIL # BLD AUTO: 0.11 THOUSAND/ÂΜL (ref 0–0.61)
EOSINOPHIL NFR BLD AUTO: 1 % (ref 0–6)
ERYTHROCYTE [DISTWIDTH] IN BLOOD BY AUTOMATED COUNT: 12.8 % (ref 11.6–15.1)
FLUAV RNA RESP QL NAA+PROBE: NEGATIVE
FLUBV RNA RESP QL NAA+PROBE: NEGATIVE
GFR SERPL CREATININE-BSD FRML MDRD: 63 ML/MIN/1.73SQ M
GLUCOSE SERPL-MCNC: 94 MG/DL (ref 65–140)
GLUCOSE UR STRIP-MCNC: NEGATIVE MG/DL
HCT VFR BLD AUTO: 39.9 % (ref 34.8–46.1)
HGB BLD-MCNC: 13.1 G/DL (ref 11.5–15.4)
HGB UR QL STRIP.AUTO: NEGATIVE
IMM GRANULOCYTES # BLD AUTO: 0.12 THOUSAND/UL (ref 0–0.2)
IMM GRANULOCYTES NFR BLD AUTO: 1 % (ref 0–2)
INR PPP: 0.84 (ref 0.84–1.19)
KETONES UR STRIP-MCNC: NEGATIVE MG/DL
LACTATE SERPL-SCNC: 1.6 MMOL/L (ref 0.5–2)
LEUKOCYTE ESTERASE UR QL STRIP: NEGATIVE
LIPASE SERPL-CCNC: 21 U/L (ref 11–82)
LYMPHOCYTES # BLD AUTO: 1.21 THOUSANDS/ÂΜL (ref 0.6–4.47)
LYMPHOCYTES NFR BLD AUTO: 8 % (ref 14–44)
MCH RBC QN AUTO: 31.3 PG (ref 26.8–34.3)
MCHC RBC AUTO-ENTMCNC: 32.8 G/DL (ref 31.4–37.4)
MCV RBC AUTO: 96 FL (ref 82–98)
MONOCYTES # BLD AUTO: 0.78 THOUSAND/ÂΜL (ref 0.17–1.22)
MONOCYTES NFR BLD AUTO: 5 % (ref 4–12)
MUCOUS THREADS UR QL AUTO: ABNORMAL
NEUTROPHILS # BLD AUTO: 13.24 THOUSANDS/ÂΜL (ref 1.85–7.62)
NEUTS SEG NFR BLD AUTO: 85 % (ref 43–75)
NITRITE UR QL STRIP: NEGATIVE
NON-SQ EPI CELLS URNS QL MICRO: ABNORMAL /HPF
NRBC BLD AUTO-RTO: 0 /100 WBCS
PH UR STRIP.AUTO: 6.5 [PH]
PLATELET # BLD AUTO: 113 THOUSANDS/UL (ref 149–390)
PMV BLD AUTO: 10.6 FL (ref 8.9–12.7)
POTASSIUM SERPL-SCNC: 3.7 MMOL/L (ref 3.5–5.3)
PROCALCITONIN SERPL-MCNC: 0.05 NG/ML
PROT SERPL-MCNC: 7.5 G/DL (ref 6.4–8.4)
PROT UR STRIP-MCNC: ABNORMAL MG/DL
PROTHROMBIN TIME: 11.9 SECONDS (ref 11.6–14.5)
RBC # BLD AUTO: 4.18 MILLION/UL (ref 3.81–5.12)
RBC #/AREA URNS AUTO: ABNORMAL /HPF
RSV RNA RESP QL NAA+PROBE: NEGATIVE
SARS-COV-2 RNA RESP QL NAA+PROBE: NEGATIVE
SODIUM SERPL-SCNC: 138 MMOL/L (ref 135–147)
SP GR UR STRIP.AUTO: <1.005 (ref 1–1.03)
UROBILINOGEN UR STRIP-ACNC: <2 MG/DL
WBC # BLD AUTO: 15.52 THOUSAND/UL (ref 4.31–10.16)
WBC #/AREA URNS AUTO: ABNORMAL /HPF

## 2024-04-13 PROCEDURE — 83880 ASSAY OF NATRIURETIC PEPTIDE: CPT | Performed by: EMERGENCY MEDICINE

## 2024-04-13 PROCEDURE — 83690 ASSAY OF LIPASE: CPT | Performed by: EMERGENCY MEDICINE

## 2024-04-13 PROCEDURE — 93005 ELECTROCARDIOGRAM TRACING: CPT

## 2024-04-13 PROCEDURE — 96367 TX/PROPH/DG ADDL SEQ IV INF: CPT

## 2024-04-13 PROCEDURE — 99285 EMERGENCY DEPT VISIT HI MDM: CPT

## 2024-04-13 PROCEDURE — 74177 CT ABD & PELVIS W/CONTRAST: CPT

## 2024-04-13 PROCEDURE — 84484 ASSAY OF TROPONIN QUANT: CPT | Performed by: EMERGENCY MEDICINE

## 2024-04-13 PROCEDURE — 85025 COMPLETE CBC W/AUTO DIFF WBC: CPT | Performed by: EMERGENCY MEDICINE

## 2024-04-13 PROCEDURE — 84145 PROCALCITONIN (PCT): CPT | Performed by: EMERGENCY MEDICINE

## 2024-04-13 PROCEDURE — 85379 FIBRIN DEGRADATION QUANT: CPT | Performed by: EMERGENCY MEDICINE

## 2024-04-13 PROCEDURE — 84484 ASSAY OF TROPONIN QUANT: CPT | Performed by: INTERNAL MEDICINE

## 2024-04-13 PROCEDURE — 96365 THER/PROPH/DIAG IV INF INIT: CPT

## 2024-04-13 PROCEDURE — 80053 COMPREHEN METABOLIC PANEL: CPT | Performed by: EMERGENCY MEDICINE

## 2024-04-13 PROCEDURE — 85610 PROTHROMBIN TIME: CPT | Performed by: EMERGENCY MEDICINE

## 2024-04-13 PROCEDURE — 71045 X-RAY EXAM CHEST 1 VIEW: CPT

## 2024-04-13 PROCEDURE — 71275 CT ANGIOGRAPHY CHEST: CPT

## 2024-04-13 PROCEDURE — 0241U HB NFCT DS VIR RESP RNA 4 TRGT: CPT | Performed by: EMERGENCY MEDICINE

## 2024-04-13 PROCEDURE — 99285 EMERGENCY DEPT VISIT HI MDM: CPT | Performed by: EMERGENCY MEDICINE

## 2024-04-13 PROCEDURE — 99223 1ST HOSP IP/OBS HIGH 75: CPT | Performed by: INTERNAL MEDICINE

## 2024-04-13 PROCEDURE — 36415 COLL VENOUS BLD VENIPUNCTURE: CPT | Performed by: EMERGENCY MEDICINE

## 2024-04-13 PROCEDURE — 87449 NOS EACH ORGANISM AG IA: CPT | Performed by: INTERNAL MEDICINE

## 2024-04-13 PROCEDURE — 83605 ASSAY OF LACTIC ACID: CPT | Performed by: EMERGENCY MEDICINE

## 2024-04-13 PROCEDURE — 81001 URINALYSIS AUTO W/SCOPE: CPT | Performed by: INTERNAL MEDICINE

## 2024-04-13 PROCEDURE — 85730 THROMBOPLASTIN TIME PARTIAL: CPT | Performed by: EMERGENCY MEDICINE

## 2024-04-13 PROCEDURE — 87040 BLOOD CULTURE FOR BACTERIA: CPT | Performed by: EMERGENCY MEDICINE

## 2024-04-13 RX ORDER — ACETAMINOPHEN 325 MG/1
975 TABLET ORAL ONCE
Status: COMPLETED | OUTPATIENT
Start: 2024-04-13 | End: 2024-04-13

## 2024-04-13 RX ORDER — ACETAMINOPHEN 325 MG/1
650 TABLET ORAL EVERY 6 HOURS PRN
Status: DISCONTINUED | OUTPATIENT
Start: 2024-04-13 | End: 2024-04-15 | Stop reason: HOSPADM

## 2024-04-13 RX ORDER — LEVOTHYROXINE SODIUM 0.07 MG/1
75 TABLET ORAL
Status: DISCONTINUED | OUTPATIENT
Start: 2024-04-14 | End: 2024-04-15 | Stop reason: HOSPADM

## 2024-04-13 RX ORDER — AMLODIPINE BESYLATE 5 MG/1
5 TABLET ORAL DAILY
COMMUNITY

## 2024-04-13 RX ORDER — GUAIFENESIN 600 MG/1
600 TABLET, EXTENDED RELEASE ORAL 2 TIMES DAILY
Status: DISCONTINUED | OUTPATIENT
Start: 2024-04-13 | End: 2024-04-15 | Stop reason: HOSPADM

## 2024-04-13 RX ORDER — ATORVASTATIN CALCIUM 40 MG/1
40 TABLET, FILM COATED ORAL
Status: DISCONTINUED | OUTPATIENT
Start: 2024-04-13 | End: 2024-04-15 | Stop reason: HOSPADM

## 2024-04-13 RX ORDER — PREDNISONE 20 MG/1
20 TABLET ORAL DAILY
Status: DISCONTINUED | OUTPATIENT
Start: 2024-04-14 | End: 2024-04-15 | Stop reason: HOSPADM

## 2024-04-13 RX ORDER — URSODIOL 300 MG/1
300 CAPSULE ORAL 3 TIMES DAILY
Status: DISCONTINUED | OUTPATIENT
Start: 2024-04-13 | End: 2024-04-13

## 2024-04-13 RX ORDER — ASPIRIN 81 MG/1
324 TABLET, CHEWABLE ORAL ONCE
Status: COMPLETED | OUTPATIENT
Start: 2024-04-13 | End: 2024-04-13

## 2024-04-13 RX ORDER — ONDANSETRON 2 MG/ML
4 INJECTION INTRAMUSCULAR; INTRAVENOUS EVERY 6 HOURS PRN
Status: DISCONTINUED | OUTPATIENT
Start: 2024-04-13 | End: 2024-04-15 | Stop reason: HOSPADM

## 2024-04-13 RX ORDER — ASPIRIN 325 MG
325 TABLET ORAL DAILY
Status: DISCONTINUED | OUTPATIENT
Start: 2024-04-14 | End: 2024-04-15 | Stop reason: HOSPADM

## 2024-04-13 RX ORDER — PANTOPRAZOLE SODIUM 40 MG/1
40 TABLET, DELAYED RELEASE ORAL
Status: DISCONTINUED | OUTPATIENT
Start: 2024-04-14 | End: 2024-04-15 | Stop reason: HOSPADM

## 2024-04-13 RX ORDER — CEFTRIAXONE 1 G/50ML
1000 INJECTION, SOLUTION INTRAVENOUS EVERY 24 HOURS
Status: DISCONTINUED | OUTPATIENT
Start: 2024-04-14 | End: 2024-04-15 | Stop reason: HOSPADM

## 2024-04-13 RX ORDER — URSODIOL 300 MG/1
300 CAPSULE ORAL 3 TIMES DAILY
Status: DISCONTINUED | OUTPATIENT
Start: 2024-04-14 | End: 2024-04-15 | Stop reason: HOSPADM

## 2024-04-13 RX ORDER — CEFTRIAXONE 1 G/50ML
1000 INJECTION, SOLUTION INTRAVENOUS ONCE
Status: COMPLETED | OUTPATIENT
Start: 2024-04-13 | End: 2024-04-13

## 2024-04-13 RX ORDER — SODIUM CHLORIDE 9 MG/ML
75 INJECTION, SOLUTION INTRAVENOUS CONTINUOUS
Status: DISCONTINUED | OUTPATIENT
Start: 2024-04-13 | End: 2024-04-15

## 2024-04-13 RX ORDER — CITALOPRAM 20 MG/1
20 TABLET ORAL DAILY
Status: DISCONTINUED | OUTPATIENT
Start: 2024-04-14 | End: 2024-04-15 | Stop reason: HOSPADM

## 2024-04-13 RX ORDER — VALSARTAN 320 MG/1
320 TABLET ORAL DAILY
COMMUNITY

## 2024-04-13 RX ORDER — ALBUTEROL SULFATE 90 UG/1
2 AEROSOL, METERED RESPIRATORY (INHALATION) EVERY 6 HOURS PRN
Status: DISCONTINUED | OUTPATIENT
Start: 2024-04-13 | End: 2024-04-15 | Stop reason: HOSPADM

## 2024-04-13 RX ORDER — ENOXAPARIN SODIUM 100 MG/ML
40 INJECTION SUBCUTANEOUS DAILY
Status: DISCONTINUED | OUTPATIENT
Start: 2024-04-14 | End: 2024-04-15 | Stop reason: HOSPADM

## 2024-04-13 RX ADMIN — IOHEXOL 100 ML: 350 INJECTION, SOLUTION INTRAVENOUS at 17:24

## 2024-04-13 RX ADMIN — SODIUM CHLORIDE 75 ML/HR: 0.9 INJECTION, SOLUTION INTRAVENOUS at 17:53

## 2024-04-13 RX ADMIN — URSODIOL 300 MG: 300 CAPSULE ORAL at 21:08

## 2024-04-13 RX ADMIN — CEFTRIAXONE 1000 MG: 1 INJECTION, SOLUTION INTRAVENOUS at 15:05

## 2024-04-13 RX ADMIN — GUAIFENESIN 600 MG: 600 TABLET ORAL at 18:56

## 2024-04-13 RX ADMIN — ATORVASTATIN CALCIUM 40 MG: 40 TABLET, FILM COATED ORAL at 18:56

## 2024-04-13 RX ADMIN — ACETAMINOPHEN 975 MG: 325 TABLET, FILM COATED ORAL at 13:53

## 2024-04-13 RX ADMIN — ASPIRIN 81 MG CHEWABLE TABLET 324 MG: 81 TABLET CHEWABLE at 16:32

## 2024-04-13 RX ADMIN — AZITHROMYCIN 500 MG: 500 INJECTION, POWDER, LYOPHILIZED, FOR SOLUTION INTRAVENOUS at 15:40

## 2024-04-13 NOTE — ED PROVIDER NOTES
History  Chief Complaint   Patient presents with    Weakness - Generalized     Pt to ED c/o weakness & chest pain under L breast and vomiting. States pain seems to be associated with her breathing, this has happened before and states it was pneumonia.      History from patient medical records, past medical history Sjogren's syndrome, transaminitis, overlap syndrome Hashimoto's, hysterectomy takes immunosuppressants, previous history of pneumonia.  Patient complains that she felt less energetic all week long and since yesterday has been gradually developing worsening right-sided chest upper back pain worse with deep breaths she feels like she had chills and may have pneumonia.        Prior to Admission Medications   Prescriptions Last Dose Informant Patient Reported? Taking?   CALCIUM-VITAMIN D PO  Self Yes No   Sig: Take by mouth   Calcium Carbonate-Vit D-Min (Calcium 600+D Plus Minerals) 600-400 MG-UNIT TABS  Self Yes No   Sig: Take 1 tablet by mouth 2 (two) times a day   Multiple Vitamin (multivitamin) tablet  Self Yes No   Sig: Take 1 tablet by mouth daily   NON FORMULARY  Self Yes No   Sig: Garlacin   Omega-3 Fatty Acids (OMEGA-3 FISH OIL PO)  Self Yes No   Sig: Take by mouth   PreviDent 5000 Dry Mouth 1.1 % GEL  Self Yes No   Sodium Fluoride 5000 PPM 1.1 % PSTE  Self Yes No   Sig: USE 1-2 TIMES AS INSTRUCTED   Synthroid 75 MCG tablet  Self Yes No   Sig: TAKE 1 TABLET BY MOUTH EVERY DAY IN THE MORNING ON AN EMPTY STOMACH FOR 30 DAYS   TURMERIC PO  Self Yes No   Sig: Take by mouth   albuterol (PROVENTIL HFA,VENTOLIN HFA) 90 mcg/act inhaler  Self Yes No   Sig: Inhale 2 puffs every 6 (six) hours as needed   amLODIPine (NORVASC) 5 mg tablet   Yes Yes   Sig: Take 5 mg by mouth daily   citalopram (CeleXA) 20 mg tablet  Self Yes No   Sig: Take 20 mg by mouth daily   fexofenadine (ALLEGRA) 180 MG tablet  Self Yes No   Sig: Take 180 mg by mouth if needed   ibuprofen (MOTRIN) 200 mg tablet  Self Yes No   Sig: Take 400 mg  by mouth every 6 (six) hours as needed for mild pain   omeprazole (PriLOSEC) 20 mg delayed release capsule   No No   Sig: Take 1 capsule (20 mg total) by mouth daily   predniSONE 20 mg tablet   No No   Sig: Take 1 tablet (20 mg total) by mouth daily   ursodiol (ACTIGALL) 300 mg capsule  Self No No   Sig: TAKE 1 CAPSULE BY MOUTH THREE TIMES A DAY   valsartan (DIOVAN) 320 MG tablet 2024  Yes Yes   Sig: Take 320 mg by mouth daily      Facility-Administered Medications: None       Past Medical History:   Diagnosis Date    Anemia     I stopped taking iron supplements after I went through menop    Disease of thyroid gland     Hypothyroid     Sjogren's disease (HCC)        Past Surgical History:   Procedure Laterality Date    CARPAL TUNNEL RELEASE       SECTION      COLONOSCOPY      HYSTERECTOMY       approx    IR BIOPSY LIVER RANDOM  2023    LIVER BIOPSY  ?    OOPHORECTOMY Bilateral     approx     SKIN BIOPSY      TUBAL LIGATION         Family History   Problem Relation Age of Onset    Breast cancer Mother         late 80s onset    Arthritis Mother     Hypothyroidism Mother     Vision loss Mother     Prostate cancer Father         diagnosed in his 80s    Asthma Father     Hypothyroidism Father     No Known Problems Daughter     Anemia Maternal Grandmother     No Known Problems Maternal Grandfather     No Known Problems Paternal Grandmother     No Known Problems Paternal Grandfather     Skin cancer Brother     Prostate cancer Brother 74    Colon polyps Neg Hx     Colon cancer Neg Hx      I have reviewed and agree with the history as documented.    E-Cigarette/Vaping    E-Cigarette Use Never User      E-Cigarette/Vaping Substances    Nicotine No     THC No     CBD No     Flavoring No     Other No     Unknown No      Social History     Tobacco Use    Smoking status: Former     Current packs/day: 0.00     Average packs/day: 0.3 packs/day for 15.0 years (3.8 ttl pk-yrs)     Types: Cigarettes      Start date: 1971     Quit date: 1979     Years since quittin.8    Smokeless tobacco: Never    Tobacco comments:     I was never a heavy smoker   Vaping Use    Vaping status: Never Used   Substance Use Topics    Alcohol use: Yes     Comment: 1 drink social drinker    Drug use: Never       Review of Systems   Constitutional:  Positive for chills and fatigue. Negative for fever.   HENT:  Negative for ear pain and sore throat.    Eyes:  Negative for pain and visual disturbance.   Respiratory:  Positive for cough and shortness of breath.    Cardiovascular:  Positive for chest pain. Negative for palpitations.   Gastrointestinal:  Positive for vomiting. Negative for abdominal pain, constipation and diarrhea.   Genitourinary:  Negative for dysuria and hematuria.   Musculoskeletal:  Positive for back pain. Negative for arthralgias.   Skin:  Negative for color change and rash.   Neurological:  Negative for seizures and syncope.   All other systems reviewed and are negative.      Physical Exam  Physical Exam  Vitals and nursing note reviewed.   Constitutional:       General: She is not in acute distress.     Appearance: She is well-developed.   HENT:      Head: Normocephalic and atraumatic.   Eyes:      Conjunctiva/sclera: Conjunctivae normal.   Cardiovascular:      Rate and Rhythm: Normal rate and regular rhythm.      Heart sounds: No murmur heard.  Pulmonary:      Effort: Pulmonary effort is normal. No respiratory distress.      Breath sounds: Examination of the right-lower field reveals decreased breath sounds. Decreased breath sounds present.      Comments: Patient splinting in pain when she coughs and takes deep breaths  Chest:       Abdominal:      Palpations: Abdomen is soft.      Tenderness: There is no abdominal tenderness.   Musculoskeletal:         General: No swelling.      Cervical back: Neck supple. No tenderness or bony tenderness.      Thoracic back: No tenderness or bony tenderness.       Lumbar back: No bony tenderness.   Skin:     General: Skin is warm and dry.      Capillary Refill: Capillary refill takes less than 2 seconds.   Neurological:      General: No focal deficit present.      Mental Status: She is alert and oriented to person, place, and time.   Psychiatric:         Mood and Affect: Mood is anxious.         Vital Signs  ED Triage Vitals [04/13/24 1306]   Temperature Pulse Respirations Blood Pressure SpO2   98.4 °F (36.9 °C) 78 18 153/66 95 %      Temp Source Heart Rate Source Patient Position - Orthostatic VS BP Location FiO2 (%)   Oral Monitor Sitting Left arm --      Pain Score       7           Vitals:    04/13/24 1530 04/13/24 1600 04/13/24 1700 04/13/24 2048   BP: 112/58 115/56 122/56 116/64   Pulse: 77 78 78 70   Patient Position - Orthostatic VS: Sitting Sitting Sitting          Visual Acuity  Visual Acuity      Flowsheet Row Most Recent Value   L Pupil Size (mm) 3   R Pupil Size (mm) 3            ED Medications  Medications   albuterol (PROVENTIL HFA,VENTOLIN HFA) inhaler 2 puff (has no administration in time range)   citalopram (CeleXA) tablet 20 mg (has no administration in time range)   pantoprazole (PROTONIX) EC tablet 40 mg (has no administration in time range)   predniSONE tablet 20 mg (has no administration in time range)   levothyroxine tablet 75 mcg (has no administration in time range)   sodium chloride 0.9 % infusion (75 mL/hr Intravenous New Bag 4/13/24 1753)   acetaminophen (TYLENOL) tablet 650 mg (has no administration in time range)   ondansetron (ZOFRAN) injection 4 mg (has no administration in time range)   guaiFENesin (MUCINEX) 12 hr tablet 600 mg (600 mg Oral Given 4/13/24 1856)   enoxaparin (LOVENOX) subcutaneous injection 40 mg (has no administration in time range)   cefTRIAXone (ROCEPHIN) IVPB (premix in dextrose) 1,000 mg 50 mL (has no administration in time range)   azithromycin (ZITHROMAX) 500 mg in sodium chloride 0.9% 250mL IVPB 500 mg (has no  administration in time range)   aspirin tablet 325 mg (has no administration in time range)   atorvastatin (LIPITOR) tablet 40 mg (40 mg Oral Given 4/13/24 1856)   ursodiol (ACTIGALL) capsule 300 mg (has no administration in time range)   acetaminophen (TYLENOL) tablet 975 mg (975 mg Oral Given 4/13/24 1353)   cefTRIAXone (ROCEPHIN) IVPB (premix in dextrose) 1,000 mg 50 mL (0 mg Intravenous Stopped 4/13/24 1531)   azithromycin (ZITHROMAX) 500 mg in sodium chloride 0.9% 250mL IVPB 500 mg (0 mg Intravenous Stopped 4/13/24 1644)   aspirin chewable tablet 324 mg (324 mg Oral Given 4/13/24 1632)   iohexol (OMNIPAQUE) 350 MG/ML injection (SINGLE-DOSE) 100 mL (100 mL Intravenous Given 4/13/24 1724)       Diagnostic Studies  Results Reviewed       Procedure Component Value Units Date/Time    Urine Microscopic [[id number]]  (Abnormal) Collected: 04/13/24 1752    Lab Status: Final result Specimen: Urine, Clean Catch Updated: 04/13/24 1858     RBC, UA 0-1 /hpf      WBC, UA 1-2 /hpf      Epithelial Cells Occasional /hpf      Bacteria, UA Occasional /hpf      MUCUS THREADS Occasional    UA w Reflex to Microscopic w Reflex to Culture [[id number]]  (Abnormal) Collected: 04/13/24 1752    Lab Status: Final result Specimen: Urine, Clean Catch Updated: 04/13/24 1846     Color, UA Yellow     Clarity, UA Clear     Specific Gravity, UA <1.005     pH, UA 6.5     Leukocytes, UA Negative     Nitrite, UA Negative     Protein, UA 30 (1+) mg/dl      Glucose, UA Negative mg/dl      Ketones, UA Negative mg/dl      Urobilinogen, UA <2.0 mg/dl      Bilirubin, UA Negative     Occult Blood, UA Negative    HS Troponin I 4hr [[id number]]  (Abnormal) Collected: 04/13/24 1749    Lab Status: Final result Specimen: Blood from Arm, Left Updated: 04/13/24 1832     hs TnI 4hr 1,301 ng/L      Delta 4hr hsTnI -67 ng/L     Strep Pneumoniae, Urine [[id number]] Collected: 04/13/24 1751    Lab Status: In process Specimen: Urine, Clean Catch Updated: 04/13/24 1806     Legionella antigen, Urine [[id number]] Collected: 04/13/24 1751    Lab Status: In process Specimen: Urine, Clean Catch Updated: 04/13/24 1805    Sputum culture and Gram stain [[id number]]     Lab Status: No result Specimen: Expectorated Sputum     B-Type Natriuretic Peptide(BNP) [[id number]]  (Normal) Collected: 04/13/24 1335    Lab Status: Final result Specimen: Blood from Arm, Left Updated: 04/13/24 1628     BNP 88 pg/mL     HS Troponin I 2hr [[id number]]  (Abnormal) Collected: 04/13/24 1546    Lab Status: Final result Specimen: Blood from Arm, Left Updated: 04/13/24 1612     hs TnI 2hr 1,227 ng/L      Delta 2hr hsTnI -141 ng/L     Lipase [[id number]]  (Normal) Collected: 04/13/24 1335    Lab Status: Final result Specimen: Blood from Arm, Left Updated: 04/13/24 1511     Lipase 21 u/L     D-dimer, quantitative [[id number]]  (Abnormal) Collected: 04/13/24 1335    Lab Status: Final result Specimen: Blood from Arm, Left Updated: 04/13/24 1506     D-Dimer, Quant 0.68 ug/ml FEU     Narrative:      In the evaluation for possible pulmonary embolism, in the appropriate (Well's Score of 4 or less) patient, the age adjusted d-dimer cutoff for this patient can be calculated as:    Age x 0.01 (in ug/mL) for Age-adjusted D-dimer exclusion threshold for a patient over 50 years.    FLU/RSV/COVID - if FLU/RSV clinically relevant [[id number]]  (Normal) Collected: 04/13/24 1335    Lab Status: Final result Specimen: Nares from Nose Updated: 04/13/24 1440     SARS-CoV-2 Negative     INFLUENZA A PCR Negative     INFLUENZA B PCR Negative     RSV PCR Negative    Narrative:      FOR PEDIATRIC PATIENTS - copy/paste COVID Guidelines URL to browser: [email or web address]    SARS-CoV-2 assay is a Nucleic Acid Amplification assay intended for the  qualitative detection of nucleic acid from SARS-CoV-2 in nasopharyngeal  swabs. Results are for the presumptive identification of SARS-CoV-2  RNA.    Positive results are indicative of infection with SARS-CoV-2, the virus  causing COVID-19, but do not rule out bacterial infection or co-infection  with other viruses. Laboratories within the [place] and its  territories are required to report all positive results to the appropriate  public health authorities. Negative results do not preclude SARS-CoV-2  infection and should not be used as the sole basis for treatment or other  patient management decisions. Negative results must be combined with  clinical observations, patient history, and epidemiological information.  This test has not been FDA cleared or approved.    This test has been authorized by FDA under an Emergency Use Authorization  (EUA). This test is only authorized for the duration of time the  declaration that circumstances exist justifying the authorization of the  emergency use of an in vitro diagnostic tests for detection of SARS-CoV-2  virus and/or diagnosis of COVID-19 infection under section 564(b)(1) of  the Act, 21 U.S.C. 360bbb-3(b)(1), unless the authorization is terminated  or revoked sooner. The test has been validated but independent review by FDA  and CLIA is pending.    Test performed using [employer]: This RT-PCR assay targets N2,  a region unique to SARS-CoV-2. A conserved region in the E-gene was chosen  for pan-Sarbecovirus detection which includes SARS-CoV-2.    According to CMS-2020-01-R, this platform meets the definition of high-throughput technology.    Procalcitonin [[id number]]  (Normal) Collected: 04/13/24 1335    Lab Status: Final result Specimen: Blood from Arm, Left Updated: 04/13/24 1427     Procalcitonin 0.05 ng/ml     HS Troponin 0hr (reflex protocol) [[id number]]  (Abnormal) Collected: 04/13/24 1335    Lab Status: Final result Specimen: Blood from Arm, Left Updated: 04/13/24 1426     hs TnI 0hr 1,368 ng/L     Comprehensive metabolic panel [[id number]]  (Abnormal) Collected: 04/13/24 1335    Lab  Status: Final result Specimen: Blood from Arm, Left Updated: 04/13/24 1420     Sodium 138 mmol/L      Potassium 3.7 mmol/L      Chloride 102 mmol/L      CO2 27 mmol/L      ANION GAP 9 mmol/L      BUN 21 mg/dL      Creatinine 0.93 mg/dL      Glucose 94 mg/dL      Calcium 9.4 mg/dL      AST 55 U/L      ALT 93 U/L      Alkaline Phosphatase 113 U/L      Total Protein 7.5 g/dL      Albumin 4.3 g/dL      Total Bilirubin 0.86 mg/dL      eGFR 63 ml/min/1.73sq m     Narrative:      National Kidney Disease Foundation guidelines for Chronic Kidney Disease (CKD):     Stage 1 with normal or high GFR (GFR > 90 mL/min/1.73 square meters)    Stage 2 Mild CKD (GFR = 60-89 mL/min/1.73 square meters)    Stage 3A Moderate CKD (GFR = 45-59 mL/min/1.73 square meters)    Stage 3B Moderate CKD (GFR = 30-44 mL/min/1.73 square meters)    Stage 4 Severe CKD (GFR = 15-29 mL/min/1.73 square meters)    Stage 5 End Stage CKD (GFR <15 mL/min/1.73 square meters)  Note: GFR calculation is accurate only with a steady state creatinine    Lactic acid [[id number]]  (Normal) Collected: 04/13/24 1335    Lab Status: Final result Specimen: Blood from Arm, Left Updated: 04/13/24 1420     LACTIC ACID 1.6 mmol/L     Narrative:      Result may be elevated if tourniquet was used during collection.    Protime-INR [[id number]]  (Normal) Collected: 04/13/24 1335    Lab Status: Final result Specimen: Blood from Arm, Left Updated: 04/13/24 1415     Protime 11.9 seconds      INR 0.84    APTT [[id number]]  (Normal) Collected: 04/13/24 1335    Lab Status: Final result Specimen: Blood from Arm, Left Updated: 04/13/24 1415     PTT 27 seconds     CBC and differential [[id number]]  (Abnormal) Collected: 04/13/24 1335    Lab Status: Final result Specimen: Blood from Arm, Left Updated: 04/13/24 1407     WBC 15.52 Thousand/uL      RBC 4.18 Million/uL      Hemoglobin 13.1 g/dL      Hematocrit 39.9 %      MCV 96 fL      MCH 31.3 pg      MCHC 32.8 g/dL      RDW 12.8 %      MPV 10.6  fL      Platelets 113 Thousands/uL      nRBC 0 /100 WBCs      Segmented % 85 %      Immature Grans % 1 %      Lymphocytes % 8 %      Monocytes % 5 %      Eosinophils Relative 1 %      Basophils Relative 0 %      Absolute Neutrophils 13.24 Thousands/µL      Absolute Immature Grans 0.12 Thousand/uL      Absolute Lymphocytes 1.21 Thousands/µL      Absolute Monocytes 0.78 Thousand/µL      Eosinophils Absolute 0.11 Thousand/µL      Basophils Absolute 0.06 Thousands/µL     Blood culture #1 [[id number]] Collected: 04/13/24 1353    Lab Status: In process Specimen: Blood from Hand, Right Updated: 04/13/24 1400    Blood culture #2 [[id number]] Collected: 04/13/24 1335    Lab Status: In process Specimen: Blood from Arm, Left Updated: 04/13/24 1400                   CT pe study w abdomen pelvis w contrast   Final Result by Deyanira Vargas MD (04/13 1913)      No pulmonary embolism seen   .   Area of consolidation right middle lobe with adjacent groundglass density, suggest pneumonia         A nodular appearing density at the lateral aspect of the left lower lobe abutting the pleura measuring 2 cm x 1.3 cm, indeterminate may be additional consolidation related to pneumonia      However follow-up at 6 to 8 weeks suggested to exclude any pulmonary nodule and parenchymal lesion      Mild branching densities in the lungs suggest associated infectious bronchiolitis no acute inflammatory stranding in the abdominal pelvis   No no bowel obstruction   Stable splenic lesion      The study was marked in EPIC for immediate notification.         Workstation performed: [id number]         XR chest portable   ED Interpretation by Fidel Monte DO (04/13 9225)   Subtle opacity right lower lung interpreted by me, no ptx, no effusion      Final Result by Amanda Escalera MD (04/13 1817)      Right middle lobe pneumonia.            Workstation performed: [id number]                    Procedures  ECG 12 Lead Documentation Only    Date/Time:  4/13/2024 4:08 PM    Performed by: Fidel Monte DO  Authorized by: Fidel Monte DO    Indications / Diagnosis:  Right chest pain  ECG reviewed by me, the ED Provider: yes    Patient location:  ED  Previous ECG:     Previous ECG:  Unavailable  Interpretation:     Interpretation: normal    Rate:     ECG rate:  84    ECG rate assessment: normal    Rhythm:     Rhythm: sinus rhythm    Ectopy:     Ectopy: none    QRS:     QRS axis:  Normal    QRS intervals:  Normal  Conduction:     Conduction: normal    ST segments:     ST segments:  Normal  T waves:     T waves: normal    Comments:      This EKG was interpreted by me.           ED Course  ED Course as of 04/13/24 2133   Sat Apr 13, 2024   1629 CORRECTION TO HX - BEBETO HAD RIGHT UPPER BACK PAIN YESTERDAY, MUSCULOSKELETAL.  THE RIGHT CHEST PAIN STARTED TODAY 9 AM, VOMITED ONCE AND NOW PAIN IS 5/10 WHEN TAKES DEEP BREATH             HEART Risk Score      Flowsheet Row Most Recent Value   Heart Score Risk Calculator    History 0 Filed at: 04/13/2024 2133   ECG 0 Filed at: 04/13/2024 2133   Age 2 Filed at: 04/13/2024 2133   Risk Factors 1 Filed at: 04/13/2024 2133   Troponin 2 Filed at: 04/13/2024 2133   HEART Score 5 Filed at: 04/13/2024 2133                  PERC Rule for PE      Flowsheet Row Most Recent Value   PERC Rule for PE    Age >=50 1 Filed at: 04/13/2024 1522   HR >=100 --   O2 Sat on room air < 95% --   History of PE or DVT --   Recent trauma or surgery --   Hemoptysis --   Exogenous estrogen --   Unilateral leg swelling --   PERC Rule for PE Results 1 Filed at: 04/13/2024 1522                SBIRT 20yo+      Flowsheet Row Most Recent Value   Initial Alcohol Screen: US AUDIT-C     1. How often do you have a drink containing alcohol? 0 Filed at: 04/13/2024 1306   2. How many drinks containing alcohol do you have on a typical day you are drinking?  0 Filed at: 04/13/2024 1306   3a. Male UNDER 65: How often do you have five or more drinks on one occasion? 0  Filed at: 2024 1306   3b. FEMALE Any Age, or MALE 65+: How often do you have 4 or more drinks on one occassion? 0 Filed at: 2024 1306   Audit-C Score 0 Filed at: 2024 1306   MARY: How many times in the past year have you...    Used an illegal drug or used a prescription medication for non-medical reasons? Never Filed at: 2024 1306            Wells' Criteria for PE      Flowsheet Row Most Recent Value   Wells' Criteria for PE    Clinical signs and symptoms of DVT 0 Filed at: 2024 1523   PE is primary diagnosis or equally likely 3 Filed at: 2024 1523   HR >100 0 Filed at: 2024 1523   Immobilization at least 3 days or Surgery in the previous 4 weeks 0 Filed at: 2024 1523   Previous, objectively diagnosed PE or DVT 0 Filed at: 2024 1523   Hemoptysis 0 Filed at: 2024 1523   Malignancy with treatment within 6 months or palliative 0 Filed at: 2024 1523   Wells' Criteria Total 3 Filed at: 2024 1523                  Medical Decision Making  Diff includes acute recurrent pneumonia, coronary syndrome, PE, pancreatitis, biliary colic, less likely MI or sepsis, ptx    Amount and/or Complexity of Data Reviewed  Labs: ordered.  Radiology: ordered and independent interpretation performed.    Risk  OTC drugs.  Prescription drug management.  Decision regarding hospitalization.        PESI  Age: 69 years old  Sex: 0  History of Cancer: 0  History of Heart Failure: 0  History of Chronic Lung Disease: 0  Heart rate greater than or equal to 110: 0  Systolic BP < 100 mmH  Respiratory rate greater than or equal to 30: 0  Temperature <36°C/96.8°F: 0  Altered Mental Status (Disorientation, lethargy, stupor, or coma): 0  O2 saturation <90%: 0  PESI Score Results: 67        Disposition  Final diagnoses:   Elevated troponin   Pneumonia     Time reflects when diagnosis was documented in both MDM as applicable and the Disposition within this note       Time User Action  Codes Description Comment    4/13/2024  4:21 PM Fidel Monte Add [R79.89] Elevated troponin     4/13/2024  5:14 PM Lavell Fidel Add [J18.9] Pneumonia     4/13/2024  5:20 PM Luis Cheung Add [R07.81] Pleuritic chest pain           ED Disposition       ED Disposition   Admit    Condition   Stable    Date/Time   Sat Apr 13, 2024 8435    Comment   Case was discussed with Skye and the patient's admission status was agreed to be Admission Status: inpatient status to the service of Dr. Cheung .               Follow-up Information    None         Current Discharge Medication List        CONTINUE these medications which have NOT CHANGED    Details   amLODIPine (NORVASC) 5 mg tablet Take 5 mg by mouth daily      valsartan (DIOVAN) 320 MG tablet Take 320 mg by mouth daily      albuterol (PROVENTIL HFA,VENTOLIN HFA) 90 mcg/act inhaler Inhale 2 puffs every 6 (six) hours as needed      Calcium Carbonate-Vit D-Min (Calcium 600+D Plus Minerals) 600-400 MG-UNIT TABS Take 1 tablet by mouth 2 (two) times a day      CALCIUM-VITAMIN D PO Take by mouth      citalopram (CeleXA) 20 mg tablet Take 20 mg by mouth daily      fexofenadine (ALLEGRA) 180 MG tablet Take 180 mg by mouth if needed      ibuprofen (MOTRIN) 200 mg tablet Take 400 mg by mouth every 6 (six) hours as needed for mild pain      Multiple Vitamin (multivitamin) tablet Take 1 tablet by mouth daily      NON FORMULARY Garlacin      Omega-3 Fatty Acids (OMEGA-3 FISH OIL PO) Take by mouth      omeprazole (PriLOSEC) 20 mg delayed release capsule Take 1 capsule (20 mg total) by mouth daily  Qty: 30 capsule, Refills: 2    Associated Diagnoses: Autoimmune hepatitis (HCC); Primary biliary cholangitis (HCC)      predniSONE 20 mg tablet Take 1 tablet (20 mg total) by mouth daily  Qty: 30 tablet, Refills: 3    Associated Diagnoses: Autoimmune hepatitis (HCC); Primary biliary cholangitis (HCC)      PreviDent 5000 Dry Mouth 1.1 % GEL       Sodium Fluoride 5000 PPM 1.1 % PSTE USE 1-2  TIMES AS INSTRUCTED      Synthroid 75 MCG tablet TAKE 1 TABLET BY MOUTH EVERY DAY IN THE MORNING ON AN EMPTY STOMACH FOR 30 DAYS      TURMERIC PO Take by mouth      ursodiol (ACTIGALL) 300 mg capsule TAKE 1 CAPSULE BY MOUTH THREE TIMES A DAY  Qty: 270 capsule, Refills: 1    Associated Diagnoses: Autoimmune hepatitis (HCC); Abnormal liver function tests             No discharge procedures on file.    PDMP Review         Value Time User    PDMP Reviewed  Yes 4/13/2024  5:19 PM Luis Cheung MD            ED Provider  Electronically Signed by             Fidel Monte DO  04/13/24 3141

## 2024-04-13 NOTE — ASSESSMENT & PLAN NOTE
Patient noted to have elevated troponin  Initial troponin [id number]  EKG did not reveal acute ischemic changes  Monitor on telemetry  Trend troponins  Aspirin and statin  Cardiology consult  Echocardiogram

## 2024-04-13 NOTE — ASSESSMENT & PLAN NOTE
Patient presented with pneumonia as evidenced by cough, chills, shortness of breath and right-sided pleuritic chest pain  Does not meet SIRS criteria  Dimer is 0.68  Lactate is 1.6  COVID/flu/RSV is negative  Pro-Arben is 0.05  CT chest PE study, abdomen pelvis are pending  On Rocephin and Zithromax  Sputum culture and urine Legionella and Streptococcus antigen  Follow-up blood culture results  Trend WBC and fever curve

## 2024-04-13 NOTE — H&P
[facility]  H&P  Name: Estella Stein 69 y.o. female I MRN: [id number]  Unit/Bed#: [facility] 03 I Date of Admission: 4/13/2024   Date of Service: 4/13/2024 I Hospital Day: 0      Assessment/Plan   * Pneumonia  Assessment & Plan  Patient presented with pneumonia as evidenced by cough, chills, shortness of breath and right-sided pleuritic chest pain  Does not meet SIRS criteria  Dimer is 0.68  Lactate is 1.6  COVID/flu/RSV is negative  Pro-Arben is 0.05  CT chest PE study, abdomen pelvis are pending  On Rocephin and Zithromax  Sputum culture and urine Legionella and Streptococcus antigen  Follow-up blood culture results  Trend WBC and fever curve    Pleuritic chest pain  Assessment & Plan  Patient admitted with right-sided pleuritic chest pain  Possibly in setting of pneumonia  CT PE study is pending  Pain management as needed      Elevated troponin  Assessment & Plan  Patient noted to have elevated troponin  Initial troponin [id number]  EKG did not reveal acute ischemic changes  Monitor on telemetry  Trend troponins  Aspirin and statin  Cardiology consult  Echocardiogram    Autoimmune hepatitis (HCC)  Assessment & Plan  Follows up with Dr. Prabhakar as outpatient  On prednisone 20 mg daily and is scheduled to be started on Imuran as outpatient    Thrombocytopenia (HCC)  Assessment & Plan  Has history of thrombocytopenia  Follows up with hematology as outpatient  Platelets on admission is 113 K  Trend CBC    Sjogren's disease (HCC)  Assessment & Plan  Follows up with rheumatology as outpatient  Stable    Hypothyroid  Assessment & Plan  On Synthroid        Chief Complaint   Patient presents with    Weakness - Generalized     Pt to ED c/o weakness & chest pain under L breast and vomiting. States pain seems to be associated with her breathing, this has happened before and states it was pneumonia.         HPI:  Estella Stein is a 69 y.o. female with past medical history of Sjogren's syndrome,  autoimmune hepatitis, thrombocytopenia, hypothyroidism who presented to the emergency department with complaint of generalized weakness, chills, cough, shortness of breath and right-sided pleuritic chest pain associated with nausea and 1 episode of vomiting.  Patient states that she has been feeling weak for the past 1 week and has been having cough with some productive phlegm for few days.  Patient noticed this morning right-sided chest pain which is under her breast and worse with breathing and associated with nausea and 1 episode of vomiting.  She also felt short of breath and came to ER because she had similar symptoms a year ago and was found to have pneumonia.  Patient denies any fever but admits to having chills.  Denies any dizziness, urinary complaints, abdominal pain, diarrhea or any other complaints.  Patient was given IV antibiotics in ER.    Historical Information   Past Medical History:   Diagnosis Date    Anemia     I stopped taking iron supplements after I went through menop    Disease of thyroid gland     Hypothyroid     Sjogren's disease (HCC)      Past Surgical History:   Procedure Laterality Date    CARPAL TUNNEL RELEASE       SECTION      COLONOSCOPY      HYSTERECTOMY       approx    IR BIOPSY LIVER RANDOM  2023    LIVER BIOPSY  2017?    OOPHORECTOMY Bilateral     approx 2014    SKIN BIOPSY      TUBAL LIGATION       Social History   Social History     Substance and Sexual Activity   Alcohol Use Yes    Comment: 1 drink social drinker     Social History     Substance and Sexual Activity   Drug Use Never     Social History     Tobacco Use   Smoking Status Former    Current packs/day: 0.00    Average packs/day: 0.3 packs/day for 15.0 years (3.8 ttl pk-yrs)    Types: Cigarettes    Start date: 1971    Quit date: 1979    Years since quittin.8   Smokeless Tobacco Never   Tobacco Comments    I was never a heavy smoker     Family History   Problem Relation Age of Onset     Breast cancer Mother         late 80s onset    Arthritis Mother     Hypothyroidism Mother     Vision loss Mother     Prostate cancer Father         diagnosed in his 80s    Asthma Father     Hypothyroidism Father     No Known Problems Daughter     Anemia Maternal Grandmother     No Known Problems Maternal Grandfather     No Known Problems Paternal Grandmother     No Known Problems Paternal Grandfather     Skin cancer Brother     Prostate cancer Brother 74    Colon polyps Neg Hx     Colon cancer Neg Hx        Meds/Allergies   Allergies   Allergen Reactions    Other Tongue Swelling     Eggplant - dysphagia    Shellfish-Derived Products - Food Allergy Swelling       Meds:    Current Facility-Administered Medications:     acetaminophen (TYLENOL) tablet 650 mg, 650 mg, Oral, Q6H PRN, Luis Cheung MD    albuterol (PROVENTIL HFA,VENTOLIN HFA) inhaler 2 puff, 2 puff, Inhalation, Q6H PRN, Luis Cheung MD    [START ON 4/14/2024] aspirin tablet 325 mg, 325 mg, Oral, Daily, Luis Cheung MD    atorvastatin (LIPITOR) tablet 40 mg, 40 mg, Oral, Daily With Dinner, Luis Cheung MD    [START ON 4/14/2024] azithromycin (ZITHROMAX) 500 mg in sodium chloride 0.9% 250mL IVPB 500 mg, 500 mg, Intravenous, Q24H, Luis Cheung MD    [START ON 4/14/2024] cefTRIAXone (ROCEPHIN) IVPB (premix in dextrose) 1,000 mg 50 mL, 1,000 mg, Intravenous, Q24H, Luis Cheung MD    [START ON 4/14/2024] citalopram (CeleXA) tablet 20 mg, 20 mg, Oral, Daily, Luis Cheung MD    [START ON 4/14/2024] enoxaparin (LOVENOX) subcutaneous injection 40 mg, 40 mg, Subcutaneous, Daily, Luis Cheung MD    guaiFENesin (MUCINEX) 12 hr tablet 600 mg, 600 mg, Oral, BID, Luis Cheung MD    [START ON 4/14/2024] levothyroxine tablet 75 mcg, 75 mcg, Oral, Early Morning, Luis Cheung MD    ondansetron (ZOFRAN) injection 4 mg, 4 mg, Intravenous, Q6H PRN, Luis Cheung MD    [START ON 4/14/2024] pantoprazole (PROTONIX) EC tablet  40 mg, 40 mg, Oral, Early Morning, Luis Cheung MD    [START ON 4/14/2024] predniSONE tablet 20 mg, 20 mg, Oral, Daily, Luis Cheung MD    sodium chloride 0.9 % infusion, 75 mL/hr, Intravenous, Continuous, Luis Cheung MD    ursodiol (ACTIGALL) capsule 300 mg, 300 mg, Oral, TID, Luis Cheung MD    Current Outpatient Medications:     albuterol (PROVENTIL HFA,VENTOLIN HFA) 90 mcg/act inhaler, Inhale 2 puffs every 6 (six) hours as needed, Disp: , Rfl:     Calcium Carbonate-Vit D-Min (Calcium 600+D Plus Minerals) 600-400 MG-UNIT TABS, Take 1 tablet by mouth 2 (two) times a day, Disp: , Rfl:     CALCIUM-VITAMIN D PO, Take by mouth, Disp: , Rfl:     citalopram (CeleXA) 20 mg tablet, , Disp: , Rfl:     fexofenadine (ALLEGRA) 180 MG tablet, Take 180 mg by mouth if needed, Disp: , Rfl:     ibuprofen (MOTRIN) 200 mg tablet, Take 400 mg by mouth every 6 (six) hours as needed for mild pain, Disp: , Rfl:     imiquimod (ALDARA) 5 % cream, Apply sparingly at bedtime 5 nights per week for 6 weeks to affected area of right arm., Disp: 30 each, Rfl: 0    Multiple Vitamin (multivitamin) tablet, Take 1 tablet by mouth daily, Disp: , Rfl:     NON FORMULARY, Garlacin, Disp: , Rfl:     Omega-3 Fatty Acids (OMEGA-3 FISH OIL PO), Take by mouth, Disp: , Rfl:     omeprazole (PriLOSEC) 20 mg delayed release capsule, Take 1 capsule (20 mg total) by mouth daily, Disp: 30 capsule, Rfl: 2    predniSONE 20 mg tablet, Take 1 tablet (20 mg total) by mouth daily, Disp: 30 tablet, Rfl: 3    PreviDent 5000 Dry Mouth 1.1 % GEL, , Disp: , Rfl:     Sodium Fluoride 5000 PPM 1.1 % PSTE, USE 1-2 TIMES AS INSTRUCTED, Disp: , Rfl:     Synthroid 112 MCG tablet, Take 75 mcg by mouth daily (Patient not taking: Reported on 2/14/2024), Disp: , Rfl:     Synthroid 75 MCG tablet, TAKE 1 TABLET BY MOUTH EVERY DAY IN THE MORNING ON AN EMPTY STOMACH FOR 30 DAYS, Disp: , Rfl:     TURMERIC PO, Take by mouth, Disp: , Rfl:     ursodiol (ACTIGALL) 300 mg  capsule, TAKE 1 CAPSULE BY MOUTH THREE TIMES A DAY, Disp: 270 capsule, Rfl: 1    (Not in a hospital admission)        Review of Systems   Constitutional:  Positive for activity change and chills.   HENT: Negative.     Eyes: Negative.    Respiratory:  Positive for cough and shortness of breath.    Cardiovascular:  Positive for chest pain.   Gastrointestinal:  Positive for nausea and vomiting.   Endocrine: Negative.    Genitourinary: Negative.    Musculoskeletal: Negative.    Skin: Negative.    Allergic/Immunologic: Negative.    Neurological:  Positive for weakness.   Hematological: Negative.    Psychiatric/Behavioral: Negative.         Current Vitals:   Blood Pressure: 122/56 (04/13/24 1700)  Pulse: 78 (04/13/24 1700)  Temperature: 98.4 °F (36.9 °C) (04/13/24 1306)  Temp Source: Oral (04/13/24 1306)  Respirations: 20 (04/13/24 1700)  Weight - Scale: 82.6 kg (182 lb) (04/13/24 1306)  SpO2: 92 % (04/13/24 1700)  SPO2 RA Rest      Flowsheet Row ED from 4/13/2024 in  [facility] Emergency Department   SpO2 92 %   SpO2 Activity At Rest   O2 Device None (Room air)   O2 Flow Rate --            Intake/Output Summary (Last 24 hours) at 4/13/2024 1746  Last data filed at 4/13/2024 1644  Gross per 24 hour   Intake 300 ml   Output --   Net 300 ml     Body mass index is 30.29 kg/m².     Physical Exam  Vitals and nursing note reviewed.   Constitutional:       General: She is not in acute distress.     Appearance: She is well-developed.   HENT:      Head: Normocephalic and atraumatic.      Nose: Nose normal.   Eyes:      General: No scleral icterus.     Conjunctiva/sclera: Conjunctivae normal.      Pupils: Pupils are equal, round, and reactive to light.   Neck:      Thyroid: No thyromegaly.      Vascular: No JVD.      Trachea: No tracheal deviation.   Cardiovascular:      Rate and Rhythm: Normal rate and regular rhythm.      Heart sounds: Normal heart sounds.   Pulmonary:      Effort: Pulmonary effort is normal.  No respiratory distress.      Breath sounds: Normal breath sounds. No wheezing or rales.   Chest:      Chest wall: No tenderness.   Abdominal:      General: Bowel sounds are normal. There is no distension.      Palpations: Abdomen is soft. There is no mass.      Tenderness: There is no abdominal tenderness. There is no guarding or rebound.   Musculoskeletal:         General: No tenderness or deformity. Normal range of motion.      Cervical back: Normal range of motion and neck supple.   Lymphadenopathy:      Cervical: No cervical adenopathy.   Skin:     General: Skin is warm.      Coloration: Skin is not pale.      Findings: No erythema or rash.   Neurological:      General: No focal deficit present.      Mental Status: She is alert and oriented to person, place, and time. Mental status is at baseline.      Cranial Nerves: No cranial nerve deficit.      Coordination: Coordination normal.   Psychiatric:         Behavior: Behavior normal.         Thought Content: Thought content normal.         Judgment: Judgment normal.         Lab Results:   CBC:   Lab Results   Component Value Date    WBC 15.52 (H) 04/13/2024    HGB 13.1 04/13/2024    HCT 39.9 04/13/2024    MCV 96 04/13/2024     (L) 04/13/2024    RBC 4.18 04/13/2024    MCH 31.3 04/13/2024    MCHC 32.8 04/13/2024    RDW 12.8 04/13/2024    MPV 10.6 04/13/2024    NRBC 0 04/13/2024     CMP:  Lab Results   Component Value Date     02/12/2014     04/13/2024     04/03/2024    CO2 27 04/13/2024    CO2 22 04/03/2024    ANIONGAP 9 02/12/2014    BUN 21 04/13/2024    BUN 25 04/03/2024    CREATININE 0.93 04/13/2024    CREATININE 0.68 02/12/2014    GLUCOSE 90 02/12/2014    CALCIUM 9.4 04/13/2024    CALCIUM 8.5 02/12/2014    AST 55 (H) 04/13/2024    AST 29 04/03/2024    ALT 93 (H) 04/13/2024    ALT 43 (H) 04/03/2024    ALKPHOS 113 (H) 04/13/2024    ALKPHOS 160 (H) 02/12/2014    PROT 7.0 02/12/2014    BILITOT 0.4 02/12/2014    EGFR 63 04/13/2024     No  "results found for: \"TROPONINI\", \"CKMB\", \"CKTOTAL\"  Coagulation:   Lab Results   Component Value Date    INR 0.84 04/13/2024    Urinalysis:  Lab Results   Component Value Date    COLORU Geary 11/06/2023    CLARITYU Clear 11/06/2023    SPECGRAV >=1.030 11/06/2023    PHUR 5.5 11/06/2023    LEUKOCYTESUR Negative 11/06/2023    NITRITE Negative 11/06/2023    GLUCOSEU 30 (3/100%) (A) 11/06/2023    KETONESU 10 (1+) (A) 11/06/2023    BILIRUBINUR Negative 11/06/2023    BLOODU Negative 11/06/2023      Amylase: No results found for: \"AMYLASE\"  Lipase:   Lab Results   Component Value Date    LIPASE 21 04/13/2024        Imaging: No results found.  EKG, Pathology, and Other Studies: I have personally reviewed the results.  VTE Pharmacologic Prophylaxis: Enoxaparin (Lovenox)  VTE Mechanical Prophylaxis: sequential compression device    Code Status: Level 1 - Full Code    Anticipated Length of Stay:  Patient will be admitted on an Inpatient basis with an anticipated length of stay of greater than 2 midnights.     Counseling / Coordination of Care  Total floor / unit time spent today 75 minutes.  Greater than 50% of total time was spent with the patient and / or family counseling and / or coordination of care.     \"This note has been constructed using a voice recognition system\"      Luis Cheung MD  4/13/2024, 5:46 PM            "

## 2024-04-13 NOTE — ASSESSMENT & PLAN NOTE
Follows up with Dr. Prabhakar as outpatient  On prednisone 20 mg daily and is scheduled to be started on Imuran as outpatient

## 2024-04-13 NOTE — ASSESSMENT & PLAN NOTE
Patient admitted with right-sided pleuritic chest pain  Possibly in setting of pneumonia  CT PE study is pending  Pain management as needed

## 2024-04-13 NOTE — ASSESSMENT & PLAN NOTE
Has history of thrombocytopenia  Follows up with hematology as outpatient  Platelets on admission is 113 K  Trend CBC

## 2024-04-14 LAB
ALBUMIN SERPL BCP-MCNC: 3.4 G/DL (ref 3.5–5)
ALP SERPL-CCNC: 107 U/L (ref 34–104)
ALT SERPL W P-5'-P-CCNC: 70 U/L (ref 7–52)
ANION GAP SERPL CALCULATED.3IONS-SCNC: 6 MMOL/L (ref 4–13)
AST SERPL W P-5'-P-CCNC: 39 U/L (ref 13–39)
ATRIAL RATE: 74 BPM
ATRIAL RATE: 84 BPM
BASOPHILS # BLD AUTO: 0.04 THOUSANDS/ÂΜL (ref 0–0.1)
BASOPHILS NFR BLD AUTO: 0 % (ref 0–1)
BILIRUB SERPL-MCNC: 0.87 MG/DL (ref 0.2–1)
BUN SERPL-MCNC: 17 MG/DL (ref 5–25)
CALCIUM ALBUM COR SERPL-MCNC: 9 MG/DL (ref 8.3–10.1)
CALCIUM SERPL-MCNC: 8.5 MG/DL (ref 8.4–10.2)
CARDIAC TROPONIN I PNL SERPL HS: 815 NG/L (ref 8–18)
CHLORIDE SERPL-SCNC: 105 MMOL/L (ref 96–108)
CHOLEST SERPL-MCNC: 240 MG/DL
CO2 SERPL-SCNC: 27 MMOL/L (ref 21–32)
CREAT SERPL-MCNC: 0.82 MG/DL (ref 0.6–1.3)
EOSINOPHIL # BLD AUTO: 0.11 THOUSAND/ÂΜL (ref 0–0.61)
EOSINOPHIL NFR BLD AUTO: 1 % (ref 0–6)
ERYTHROCYTE [DISTWIDTH] IN BLOOD BY AUTOMATED COUNT: 13.1 % (ref 11.6–15.1)
GFR SERPL CREATININE-BSD FRML MDRD: 74 ML/MIN/1.73SQ M
GLUCOSE SERPL-MCNC: 96 MG/DL (ref 65–140)
HCT VFR BLD AUTO: 37.3 % (ref 34.8–46.1)
HDLC SERPL-MCNC: 85 MG/DL
HGB BLD-MCNC: 11.8 G/DL (ref 11.5–15.4)
IMM GRANULOCYTES # BLD AUTO: 0.08 THOUSAND/UL (ref 0–0.2)
IMM GRANULOCYTES NFR BLD AUTO: 1 % (ref 0–2)
L PNEUMO1 AG UR QL IA.RAPID: NEGATIVE
LDLC SERPL CALC-MCNC: 141 MG/DL (ref 0–100)
LYMPHOCYTES # BLD AUTO: 1.66 THOUSANDS/ÂΜL (ref 0.6–4.47)
LYMPHOCYTES NFR BLD AUTO: 13 % (ref 14–44)
MAGNESIUM SERPL-MCNC: 2 MG/DL (ref 1.9–2.7)
MCH RBC QN AUTO: 29.6 PG (ref 26.8–34.3)
MCHC RBC AUTO-ENTMCNC: 31.6 G/DL (ref 31.4–37.4)
MCV RBC AUTO: 94 FL (ref 82–98)
MONOCYTES # BLD AUTO: 0.94 THOUSAND/ÂΜL (ref 0.17–1.22)
MONOCYTES NFR BLD AUTO: 8 % (ref 4–12)
NEUTROPHILS # BLD AUTO: 9.67 THOUSANDS/ÂΜL (ref 1.85–7.62)
NEUTS SEG NFR BLD AUTO: 77 % (ref 43–75)
NONHDLC SERPL-MCNC: 155 MG/DL
NRBC BLD AUTO-RTO: 0 /100 WBCS
P AXIS: 24 DEGREES
P AXIS: 78 DEGREES
PHOSPHATE SERPL-MCNC: 3.9 MG/DL (ref 2.3–4.1)
PLATELET # BLD AUTO: 118 THOUSANDS/UL (ref 149–390)
PMV BLD AUTO: 10.4 FL (ref 8.9–12.7)
POTASSIUM SERPL-SCNC: 4 MMOL/L (ref 3.5–5.3)
PR INTERVAL: 150 MS
PR INTERVAL: 154 MS
PROCALCITONIN SERPL-MCNC: 0.28 NG/ML
PROT SERPL-MCNC: 6.3 G/DL (ref 6.4–8.4)
QRS AXIS: 49 DEGREES
QRS AXIS: 70 DEGREES
QRSD INTERVAL: 84 MS
QRSD INTERVAL: 86 MS
QT INTERVAL: 350 MS
QT INTERVAL: 372 MS
QTC INTERVAL: 412 MS
QTC INTERVAL: 413 MS
RBC # BLD AUTO: 3.99 MILLION/UL (ref 3.81–5.12)
S PNEUM AG UR QL: NEGATIVE
SODIUM SERPL-SCNC: 138 MMOL/L (ref 135–147)
T WAVE AXIS: 24 DEGREES
T WAVE AXIS: 7 DEGREES
TRIGL SERPL-MCNC: 68 MG/DL
VENTRICULAR RATE: 74 BPM
VENTRICULAR RATE: 84 BPM
WBC # BLD AUTO: 12.5 THOUSAND/UL (ref 4.31–10.16)

## 2024-04-14 PROCEDURE — 80061 LIPID PANEL: CPT | Performed by: INTERNAL MEDICINE

## 2024-04-14 PROCEDURE — 84100 ASSAY OF PHOSPHORUS: CPT | Performed by: INTERNAL MEDICINE

## 2024-04-14 PROCEDURE — 99222 1ST HOSP IP/OBS MODERATE 55: CPT | Performed by: INTERNAL MEDICINE

## 2024-04-14 PROCEDURE — 84145 PROCALCITONIN (PCT): CPT | Performed by: INTERNAL MEDICINE

## 2024-04-14 PROCEDURE — 80053 COMPREHEN METABOLIC PANEL: CPT | Performed by: INTERNAL MEDICINE

## 2024-04-14 PROCEDURE — 99232 SBSQ HOSP IP/OBS MODERATE 35: CPT | Performed by: INTERNAL MEDICINE

## 2024-04-14 PROCEDURE — 85025 COMPLETE CBC W/AUTO DIFF WBC: CPT | Performed by: INTERNAL MEDICINE

## 2024-04-14 PROCEDURE — 93010 ELECTROCARDIOGRAM REPORT: CPT | Performed by: INTERNAL MEDICINE

## 2024-04-14 PROCEDURE — 99223 1ST HOSP IP/OBS HIGH 75: CPT | Performed by: INTERNAL MEDICINE

## 2024-04-14 PROCEDURE — 84484 ASSAY OF TROPONIN QUANT: CPT | Performed by: INTERNAL MEDICINE

## 2024-04-14 PROCEDURE — 83735 ASSAY OF MAGNESIUM: CPT | Performed by: INTERNAL MEDICINE

## 2024-04-14 RX ADMIN — PANTOPRAZOLE SODIUM 40 MG: 40 TABLET, DELAYED RELEASE ORAL at 05:39

## 2024-04-14 RX ADMIN — ACETAMINOPHEN 650 MG: 325 TABLET, FILM COATED ORAL at 03:35

## 2024-04-14 RX ADMIN — ENOXAPARIN SODIUM 40 MG: 40 INJECTION SUBCUTANEOUS at 09:21

## 2024-04-14 RX ADMIN — URSODIOL 300 MG: 300 CAPSULE ORAL at 22:14

## 2024-04-14 RX ADMIN — SODIUM CHLORIDE 75 ML/HR: 0.9 INJECTION, SOLUTION INTRAVENOUS at 17:40

## 2024-04-14 RX ADMIN — ATORVASTATIN CALCIUM 40 MG: 40 TABLET, FILM COATED ORAL at 17:41

## 2024-04-14 RX ADMIN — ASPIRIN 325 MG ORAL TABLET 325 MG: 325 PILL ORAL at 09:22

## 2024-04-14 RX ADMIN — AZITHROMYCIN 500 MG: 500 INJECTION, POWDER, LYOPHILIZED, FOR SOLUTION INTRAVENOUS at 17:30

## 2024-04-14 RX ADMIN — GUAIFENESIN 600 MG: 600 TABLET ORAL at 17:42

## 2024-04-14 RX ADMIN — CEFTRIAXONE 1000 MG: 1 INJECTION, SOLUTION INTRAVENOUS at 14:16

## 2024-04-14 RX ADMIN — GUAIFENESIN 600 MG: 600 TABLET ORAL at 09:22

## 2024-04-14 RX ADMIN — URSODIOL 300 MG: 300 CAPSULE ORAL at 09:22

## 2024-04-14 RX ADMIN — LEVOTHYROXINE SODIUM 75 MCG: 75 TABLET ORAL at 05:38

## 2024-04-14 RX ADMIN — CITALOPRAM 20 MG: 20 TABLET, FILM COATED ORAL at 09:22

## 2024-04-14 RX ADMIN — PREDNISONE 20 MG: 20 TABLET ORAL at 09:22

## 2024-04-14 RX ADMIN — URSODIOL 300 MG: 300 CAPSULE ORAL at 14:16

## 2024-04-14 RX ADMIN — SODIUM CHLORIDE 75 ML/HR: 0.9 INJECTION, SOLUTION INTRAVENOUS at 05:47

## 2024-04-14 NOTE — CONSULTS
Consult Note - Pulmonary and Critical Care Medicine  Estella Stein 69 y.o. female MRN: [id number]  Unit/Bed#: [facility] Encounter: [id number]    Consult requested location: Unit/Bed#: [facility]  Physician Requesting Consult: Luis Cheung MD   Reason for Consult: Pneumonia      HPI:    69-year-old female with past medical history of several autoimmune diseases including Sjogren's disease, autoimmune hepatitis, Hashimoto's thyroiditis on chronic prednisone presents for evaluation of pneumonia and elevated troponins.  Patient states over the last 2 days she has felt increasing malaise as well as chest tightness and subjective fever/chills.  She states that since she has been admitted, symptoms have improved slightly but she still feels intermittent chest tightness.  + Cough without sputum production.  No current fevers or chills.    ROS:  Twelve point review of systems was negative except for what is otherwise mentioned.      Past Medical Hx  Past Medical History:   Diagnosis Date    Anemia     I stopped taking iron supplements after I went through menop    Disease of thyroid gland     Hypothyroid     Sjogren's disease (HCC)          Past Surgical Hx  Past Surgical History:   Procedure Laterality Date    CARPAL TUNNEL RELEASE       SECTION      COLONOSCOPY      HYSTERECTOMY       approx    IR BIOPSY LIVER RANDOM  2023    LIVER BIOPSY  ?    OOPHORECTOMY Bilateral     approx     SKIN BIOPSY      TUBAL LIGATION             Family Hx  Family History   Problem Relation Age of Onset    Breast cancer Mother         late 80s onset    Arthritis Mother     Hypothyroidism Mother     Vision loss Mother     Prostate cancer Father         diagnosed in his 80s    Asthma Father     Hypothyroidism Father     No Known Problems Daughter     Anemia Maternal Grandmother     No Known Problems Maternal Grandfather     No Known Problems Paternal Grandmother     No Known Problems Paternal Grandfather      Skin cancer Brother     Prostate cancer Brother 74    Colon polyps Neg Hx     Colon cancer Neg Hx              Occupational History:   No known previous inhalation injuries       Medications    Current Facility-Administered Medications:     acetaminophen (TYLENOL) tablet 650 mg, 650 mg, Oral, Q6H PRN, Luis Cheung MD, 650 mg at 04/14/24 0335    albuterol (PROVENTIL HFA,VENTOLIN HFA) inhaler 2 puff, 2 puff, Inhalation, Q6H PRN, Luis Cheung MD    aspirin tablet 325 mg, 325 mg, Oral, Daily, Luis Cheung MD    atorvastatin (LIPITOR) tablet 40 mg, 40 mg, Oral, Daily With Dinner, Luis Cheung MD, 40 mg at 04/13/24 1856    azithromycin (ZITHROMAX) 500 mg in sodium chloride 0.9% 250mL IVPB 500 mg, 500 mg, Intravenous, Q24H, Luis Cheung MD    cefTRIAXone (ROCEPHIN) IVPB (premix in dextrose) 1,000 mg 50 mL, 1,000 mg, Intravenous, Q24H, Luis Cheung MD    citalopram (CeleXA) tablet 20 mg, 20 mg, Oral, Daily, Luis Cheung MD    enoxaparin (LOVENOX) subcutaneous injection 40 mg, 40 mg, Subcutaneous, Daily, Luis Cheung MD    guaiFENesin (MUCINEX) 12 hr tablet 600 mg, 600 mg, Oral, BID, Luis Cheung MD, 600 mg at 04/13/24 1856    levothyroxine tablet 75 mcg, 75 mcg, Oral, Early Morning, Luis Cheung MD, 75 mcg at 04/14/24 0538    ondansetron (ZOFRAN) injection 4 mg, 4 mg, Intravenous, Q6H PRN, Luis Cheung MD    pantoprazole (PROTONIX) EC tablet 40 mg, 40 mg, Oral, Early Morning, Luis Cheung MD, 40 mg at 04/14/24 0539    predniSONE tablet 20 mg, 20 mg, Oral, Daily, Luis Cheung MD    sodium chloride 0.9 % infusion, 75 mL/hr, Intravenous, Continuous, Luis Cheung MD, Last Rate: 75 mL/hr at 04/14/24 0547, 75 mL/hr at 04/14/24 0547    ursodiol (ACTIGALL) capsule 300 mg, 300 mg, Oral, TID, Luis Cheung MD       Social History:   Social History     Socioeconomic History    Marital status: /Civil Union     Spouse name: Not on file    Number of  "children: Not on file    Years of education: Not on file    Highest education level: Not on file   Occupational History    Not on file   Tobacco Use    Smoking status: Former     Current packs/day: 0.00     Average packs/day: 0.3 packs/day for 15.0 years (3.8 ttl pk-yrs)     Types: Cigarettes     Start date: 1971     Quit date: 1979     Years since quittin.9    Smokeless tobacco: Never    Tobacco comments:     I was never a heavy smoker   Vaping Use    Vaping status: Never Used   Substance and Sexual Activity    Alcohol use: Yes     Comment: 1 drink social drinker    Drug use: Never    Sexual activity: Not Currently     Partners: Female     Birth control/protection: Post-menopausal     Comment: when I did use birth control I was on the pill   Other Topics Concern    Not on file   Social History Narrative    Not on file     Social Determinants of Health     Financial Resource Strain: Not on file   Food Insecurity: Not on file   Transportation Needs: Not on file   Physical Activity: Not on file   Stress: Not on file   Social Connections: Not on file   Intimate Partner Violence: Not on file   Housing Stability: Not on file           Vitals: Blood pressure 123/64, pulse 68, temperature 98.9 °F (37.2 °C), resp. rate 18, height 5' 5\" (1.651 m), weight 81.6 kg (180 lb), SpO2 93%., Body mass index is 29.95 kg/m².    Physical Exam:  GEN  NAD  HEENT  ncat, non icteric, MM moist  NECK  supple, no JVD, no LAD  CV  +s1s2, no mrg, RRR  PULM  CTA BL, no wrr  ABD  soft, nt, + obese abdomen, + BS  EXT  no edema, no cyanosis, no clubbing  NEURO  Aox3, no focal weakness      Labs:  I personally viewed and interpreted but not limited to the following laboratory studies:  Procalcitonin 0.28    Imaging:  I personally viewed and interpreted the following imaging studies:  CT chest 2024 shows loose right middle lobe consolidation with diffuse predominant mosaic pattern attenuation concerning for air " "trapping.      Assessment:  Pneumonia  Medically induced immunocompromise state (chronic prednisone)  CT chest abnormality  Elevated troponins    Plan:  I agree with current antibiotic regimen.  Patient is relatively immunocompromised due to chronic prednisone use as well as multiple autoimmune conditions  Patient states that she has a daughter with asthma but she does not have asthma.  She has a distant smoking history.  She has no pulmonary symptoms at baseline.  I recommend repeat CT chest 6 weeks from initial CT chest.  I recommend follow-up with pulmonary medicine after repeat CT chest  Pulmonary medicine will sign off at this time.  Please reconsult for any further questions or concerns.  Plan discussed with primary team    Note: Portions of the record may have been created with voice recognition software. Occasional wrong word or \"sound a like\" substitutions may have occurred due to the inherent limitations of voice recognition software. Read the chart carefully and recognize, using context, where substitutions have occurred.     Joseph Louis M.D.  [facility]    "

## 2024-04-14 NOTE — CONSULTS
Consultation - Cardiology   Estella Stein 69 y.o. female MRN: [id number]  Unit/Bed#: -01 Encounter: [id number]    Assessment/Plan     Assessment:    Non Mi troponin elevation    Plan:    Patient is currently feeling well. The troponin elevation is likely secondary to her pneumonia. Resting EKG is normal. She had pleurtic right sided chest discomfort but that has subsided. Recommend echocardiogram tomorrow.       History of Present Illness   Physician Requesting Consult: Luis Cheung MD  Reason for Consult / Principal Problem: Chest Pain  HPI: Estella Stein is a 69 y.o. year old female who presents with chest pain and shortness of breath along with fatigue. Patient was having right sided pleuritic chest discomfort. Troponin also elevated on presentation.     Patient also has a history of leukopenia, thrombocytopenia, autoimmune hepatitis and sjogrens disease.     Resting EKG is normal.     Her chest discomfort is now gone.         Consult to cardiology  Consult performed by: Reg Figueredo MD  Consult ordered by: Fidel Monte DO          Review of Systems   Constitutional:  Negative for chills and fever.   HENT:  Negative for ear pain and sore throat.    Eyes:  Negative for pain and visual disturbance.   Respiratory:  Negative for cough and shortness of breath.    Cardiovascular:  Negative for chest pain and palpitations.   Gastrointestinal:  Negative for abdominal pain and vomiting.   Genitourinary:  Negative for dysuria and hematuria.   Musculoskeletal:  Negative for arthralgias and back pain.   Skin:  Negative for color change and rash.   Neurological:  Negative for seizures and syncope.   All other systems reviewed and are negative.      Historical Information   Past Medical History:   Diagnosis Date    Anemia 1977    I stopped taking iron supplements after I went through menop    Disease of thyroid gland     Hypothyroid     Sjogren's disease (HCC)      Past Surgical History:   Procedure  Laterality Date    CARPAL TUNNEL RELEASE       SECTION      COLONOSCOPY      HYSTERECTOMY      2014 approx    IR BIOPSY LIVER RANDOM  2023    LIVER BIOPSY  2017?    OOPHORECTOMY Bilateral     approx 2014    SKIN BIOPSY      TUBAL LIGATION       Social History     Substance and Sexual Activity   Alcohol Use Yes    Comment: 1 drink social drinker     Social History     Substance and Sexual Activity   Drug Use Never     E-Cigarette/Vaping    E-Cigarette Use Never User      E-Cigarette/Vaping Substances    Nicotine No     THC No     CBD No     Flavoring No     Other No     Unknown No      Social History     Tobacco Use   Smoking Status Former    Current packs/day: 0.00    Average packs/day: 0.3 packs/day for 15.0 years (3.8 ttl pk-yrs)    Types: Cigarettes    Start date: 1971    Quit date: 1979    Years since quittin.9   Smokeless Tobacco Never   Tobacco Comments    I was never a heavy smoker     Family History:   Family History   Problem Relation Age of Onset    Breast cancer Mother         late 80s onset    Arthritis Mother     Hypothyroidism Mother     Vision loss Mother     Prostate cancer Father         diagnosed in his 80s    Asthma Father     Hypothyroidism Father     No Known Problems Daughter     Anemia Maternal Grandmother     No Known Problems Maternal Grandfather     No Known Problems Paternal Grandmother     No Known Problems Paternal Grandfather     Skin cancer Brother     Prostate cancer Brother 74    Colon polyps Neg Hx     Colon cancer Neg Hx        Meds/Allergies   current meds:   Current Facility-Administered Medications   Medication Dose Route Frequency    acetaminophen (TYLENOL) tablet 650 mg  650 mg Oral Q6H PRN    albuterol (PROVENTIL HFA,VENTOLIN HFA) inhaler 2 puff  2 puff Inhalation Q6H PRN    aspirin tablet 325 mg  325 mg Oral Daily    atorvastatin (LIPITOR) tablet 40 mg  40 mg Oral Daily With Dinner    azithromycin (ZITHROMAX) 500 mg in sodium chloride 0.9% 250mL  "IVPB 500 mg  500 mg Intravenous Q24H    cefTRIAXone (ROCEPHIN) IVPB (premix in dextrose) 1,000 mg 50 mL  1,000 mg Intravenous Q24H    citalopram (CeleXA) tablet 20 mg  20 mg Oral Daily    enoxaparin (LOVENOX) subcutaneous injection 40 mg  40 mg Subcutaneous Daily    guaiFENesin (MUCINEX) 12 hr tablet 600 mg  600 mg Oral BID    levothyroxine tablet 75 mcg  75 mcg Oral Early Morning    ondansetron (ZOFRAN) injection 4 mg  4 mg Intravenous Q6H PRN    pantoprazole (PROTONIX) EC tablet 40 mg  40 mg Oral Early Morning    predniSONE tablet 20 mg  20 mg Oral Daily    sodium chloride 0.9 % infusion  75 mL/hr Intravenous Continuous    ursodiol (ACTIGALL) capsule 300 mg  300 mg Oral TID     Allergies   Allergen Reactions    Other Tongue Swelling     Eggplant - dysphagia    Shellfish-Derived Products - Food Allergy Swelling       Objective   Vitals: Blood pressure 123/64, pulse 68, temperature 98.9 °F (37.2 °C), resp. rate 18, height 5' 5\" (1.651 m), weight 81.6 kg (180 lb), SpO2 93%.  Orthostatic Blood Pressures      Flowsheet Row Most Recent Value   Blood Pressure 123/64 filed at 04/14/2024 0719   Patient Position - Orthostatic VS Sitting filed at 04/13/2024 2048              Intake/Output Summary (Last 24 hours) at 4/14/2024 1007  Last data filed at 4/14/2024 0723  Gross per 24 hour   Intake 2272.5 ml   Output 850 ml   Net 1422.5 ml       Invasive Devices       Peripheral Intravenous Line  Duration             Peripheral IV 04/13/24 Left Antecubital <1 day                    Physical Exam  Vitals and nursing note reviewed.   Constitutional:       General: She is not in acute distress.     Appearance: She is well-developed.   HENT:      Head: Normocephalic and atraumatic.   Eyes:      Conjunctiva/sclera: Conjunctivae normal.   Cardiovascular:      Rate and Rhythm: Normal rate and regular rhythm.      Heart sounds: No murmur heard.  Pulmonary:      Effort: Pulmonary effort is normal. No respiratory distress.      Breath " sounds: Normal breath sounds.   Abdominal:      Palpations: Abdomen is soft.      Tenderness: There is no abdominal tenderness.   Musculoskeletal:         General: No swelling.      Cervical back: Neck supple.   Skin:     General: Skin is warm and dry.      Capillary Refill: Capillary refill takes less than 2 seconds.   Neurological:      Mental Status: She is alert.   Psychiatric:         Mood and Affect: Mood normal.         Lab Results: I have personally reviewed pertinent lab results.    CBC with diff:   Results from last 7 days   Lab Units 04/14/24  0344   WBC Thousand/uL 12.50*   RBC Million/uL 3.99   HEMOGLOBIN g/dL 11.8   HEMATOCRIT % 37.3   MCV fL 94   MCH pg 29.6   MCHC g/dL 31.6   RDW % 13.1   MPV fL 10.4   PLATELETS Thousands/uL 118*     CMP:   Results from last 7 days   Lab Units 04/14/24  0344   SODIUM mmol/L 138   CHLORIDE mmol/L 105   CO2 mmol/L 27   BUN mg/dL 17   CREATININE mg/dL 0.82   CALCIUM mg/dL 8.5   AST U/L 39   ALT U/L 70*   ALK PHOS U/L 107*   EGFR ml/min/1.73sq m 74     HS Troponin:   0   Lab Value Date/Time    HSTNI 815 (H) 04/14/2024 0344    HSTNI0 1,368 (H) 04/13/2024 1335    HSTNI2 1,227 (H) 04/13/2024 1546    HSTNI4 1,301 (H) 04/13/2024 1749     BNP:   Results from last 7 days   Lab Units 04/14/24  0344   POTASSIUM mmol/L 4.0   CHLORIDE mmol/L 105   CO2 mmol/L 27   BUN mg/dL 17   CREATININE mg/dL 0.82   CALCIUM mg/dL 8.5   EGFR ml/min/1.73sq m 74     Coags:   Results from last 7 days   Lab Units 04/13/24  1335   PTT seconds 27   INR  0.84     TSH:     Magnesium:   Results from last 7 days   Lab Units 04/14/24  0344   MAGNESIUM mg/dL 2.0     Lipid Profile:   Results from last 7 days   Lab Units 04/14/24  0344   HDL mg/dL 85   LDL CALC mg/dL 141*   TRIGLYCERIDES mg/dL 68     Imaging: I have personally reviewed pertinent films in PACS  EKG: NSR Normal ECG      Code Status: Level 1 - Full Code  Advance Directive and Living Will:      Power of :    POLST:      Counseling /  Coordination of Care  Total floor / unit time spent today 45 minutes.  Greater than 50% of total time was spent with the patient and / or family counseling and / or coordination of care.  A description of the counseling / coordination of care.

## 2024-04-14 NOTE — ASSESSMENT & PLAN NOTE
"Patient presented with pneumonia as evidenced by cough, chills, shortness of breath and right-sided pleuritic chest pain  Does not meet SIRS criteria  Dimer is 0.68  Lactate is 1.6  COVID/flu/RSV is negative  Pro-Arben is 0.05  CT chest PE study, abdomen pelvis-\"No pulmonary embolism seen.Area of consolidation right middle lobe with adjacent groundglass density, suggest pneumonia. A nodular appearing density at the lateral aspect of the left lower lobe abutting the pleura measuring 2 cm x 1.3 cm, indeterminate may be additional consolidation related to pneumonia.  Mild branching densities in the lungs suggest associated infectious bronchiolitis no acute inflammatory stranding in the abdominal pelvis. No bowel obstruction\"  On Rocephin and Zithromax  Urine Legionella and Streptococcus antigen is negative  Follow-up blood culture results  Trend WBC and fever curve  "

## 2024-04-14 NOTE — PLAN OF CARE
Problem: PAIN - ADULT  Goal: Verbalizes/displays adequate comfort level or baseline comfort level  Description: Interventions:  - Encourage patient to monitor pain and request assistance  - Assess pain using appropriate pain scale  - Administer analgesics based on type and severity of pain and evaluate response  - Implement non-pharmacological measures as appropriate and evaluate response  - Consider cultural and social influences on pain and pain management  - Notify physician/advanced practitioner if interventions unsuccessful or patient reports new pain  Outcome: Progressing     Problem: SAFETY ADULT  Goal: Patient will remain free of falls  Description: INTERVENTIONS:  - Educate patient/family on patient safety including physical limitations  - Instruct patient to call for assistance with activity   - Consult OT/PT to assist with strengthening/mobility   - Keep Call bell within reach  - Keep bed low and locked with side rails adjusted as appropriate  - Keep care items and personal belongings within reach  - Initiate and maintain comfort rounds  - Make Fall Risk Sign visible to staff  - Offer Toileting every  Hours, in advance of need  - Initiate/Maintain alarm  - Obtain necessary fall risk management equipment  - Apply yellow socks and bracelet for high fall risk patients  - Consider moving patient to room near nurses station  Outcome: Progressing     Problem: CARDIOVASCULAR - ADULT  Goal: Maintains optimal cardiac output and hemodynamic stability  Description: INTERVENTIONS:  - Monitor I/O, vital signs and rhythm  - Monitor for S/S and trends of decreased cardiac output  - Administer and titrate ordered vasoactive medications to optimize hemodynamic stability  - Assess quality of pulses, skin color and temperature  - Assess for signs of decreased coronary artery perfusion  - Instruct patient to report change in severity of symptoms  Outcome: Progressing     Problem: RESPIRATORY - ADULT  Goal: Achieves optimal  ventilation and oxygenation  Description: INTERVENTIONS:  - Assess for changes in respiratory status  - Assess for changes in mentation and behavior  - Position to facilitate oxygenation and minimize respiratory effort  - Oxygen administered by appropriate delivery if ordered  - Initiate smoking cessation education as indicated  - Encourage broncho-pulmonary hygiene including cough, deep breathe, Incentive Spirometry  - Assess the need for suctioning and aspirate as needed  - Assess and instruct to report SOB or any respiratory difficulty  - Respiratory Therapy support as indicated  Outcome: Progressing

## 2024-04-14 NOTE — ASSESSMENT & PLAN NOTE
Has history of thrombocytopenia  Follows up with hematology as outpatient  Platelets on admission is 113 K  Stable  Trend CBC

## 2024-04-14 NOTE — PROGRESS NOTES
"[facility]  Progress Note  Name: Estella Stein I  MRN: [id number]  Unit/Bed#: [facility] I Date of Admission: 4/13/2024   Date of Service: 4/14/2024 I Hospital Day: 1    Assessment/Plan   * Pneumonia  Assessment & Plan  Patient presented with pneumonia as evidenced by cough, chills, shortness of breath and right-sided pleuritic chest pain  Does not meet SIRS criteria  Dimer is 0.68  Lactate is 1.6  COVID/flu/RSV is negative  Pro-Arben is 0.05  CT chest PE study, abdomen pelvis-\"No pulmonary embolism seen.Area of consolidation right middle lobe with adjacent groundglass density, suggest pneumonia. A nodular appearing density at the lateral aspect of the left lower lobe abutting the pleura measuring 2 cm x 1.3 cm, indeterminate may be additional consolidation related to pneumonia.  Mild branching densities in the lungs suggest associated infectious bronchiolitis no acute inflammatory stranding in the abdominal pelvis. No bowel obstruction\"  On Rocephin and Zithromax  Urine Legionella and Streptococcus antigen is negative  Follow-up blood culture results  Trend WBC and fever curve    Pleuritic chest pain  Assessment & Plan  Patient admitted with right-sided pleuritic chest pain  Possibly in setting of pneumonia  CT PE study showed no PE  Pain management as needed      Elevated troponin  Assessment & Plan  Patient noted to have elevated troponin  Initial troponin [id number]  EKG did not reveal acute ischemic changes  Monitor on telemetry  Aspirin and statin  Cardiology consult  Echocardiogram    Autoimmune hepatitis (HCC)  Assessment & Plan  Follows up with Dr. Prabhakar as outpatient  On prednisone 20 mg daily and is scheduled to be started on Imuran as outpatient    Thrombocytopenia (HCC)  Assessment & Plan  Has history of thrombocytopenia  Follows up with hematology as outpatient  Platelets on admission is 113 K  Stable  Trend CBC    Sjogren's disease (HCC)  Assessment & " "Plan  Follows up with rheumatology as outpatient  Stable    Hypothyroid  Assessment & Plan  On Synthroid             Labs & Imaging: I have personally reviewed pertinent reports.      VTE Prophylaxis: in place.    Code Status:   Level 1 - Full Code    Patient Centered Rounds: I have performed bedside rounds with nursing staff today.    Mobility:   Basic Mobility Inpatient Raw Score: 22  JH-HLM Goal: 7: Walk 25 feet or more  JH-HLM Achieved: 6: Walk 10 steps or more  JH-HLM Goal achieved. Continue to encourage appropriate mobility.    Discussions with Specialists or Other Care Team Provider: Pulmonary    Education and Discussions with Family / Patient: Patient stated she will update her .  I offered to call    Total Time Spent on Date of Encounter in care of patient: 35 mins. This time was spent on one or more of the following: performing physical exam; counseling and coordination of care; obtaining or reviewing history; documenting in the medical record; reviewing/ordering tests, medications or procedures; communicating with other healthcare professionals and discussing with patient's family/caregivers.    Current Length of Stay: 1 day(s)    Current Patient Status: Inpatient   Certification Statement: The patient will continue to require additional inpatient hospital stay due to see my assessment and plan.     Subjective:   Patient is seen and examined at bedside.  Denies any new complaints.  Chest pain has improved significantly.  Afebrile  All other ROS are negative.    Objective:    Vitals: Blood pressure 123/64, pulse 68, temperature 98.9 °F (37.2 °C), resp. rate 18, height 5' 5\" (1.651 m), weight 81.6 kg (180 lb), SpO2 93%.,Body mass index is 29.95 kg/m².  SPO2 RA Rest      Flowsheet Row ED to Hosp-Admission (Current) from 4/13/2024 in [facility] Med Surg Unit   SpO2 93 %   SpO2 Activity At Rest   O2 Device None (Room air)   O2 Flow Rate --          I&O:   Intake/Output Summary (Last " 24 hours) at 4/14/2024 0842  Last data filed at 4/14/2024 0723  Gross per 24 hour   Intake 2272.5 ml   Output 850 ml   Net 1422.5 ml       Physical Exam:    General- Alert, lying comfortably in bed. Not in any acute distress.  Neck- Supple, No JVD  CVS- regular, S1 and S2 normal  Chest- Bilateral Air entry, No rhochi, crackles or wheezing present.  Abdomen- soft, nontender, not distended, no guarding or rigidity, BS+  Extremities-  No pedal edema, No calf tenderness                         Normal ROM in all extremities.  CNS-   Alert, awake and orientedx3. No focal deficits present.    Invasive Devices       Peripheral Intravenous Line  Duration             Peripheral IV 04/13/24 Left Antecubital <1 day                          Social History  reviewed  Family History   Problem Relation Age of Onset    Breast cancer Mother         late 80s onset    Arthritis Mother     Hypothyroidism Mother     Vision loss Mother     Prostate cancer Father         diagnosed in his 80s    Asthma Father     Hypothyroidism Father     No Known Problems Daughter     Anemia Maternal Grandmother     No Known Problems Maternal Grandfather     No Known Problems Paternal Grandmother     No Known Problems Paternal Grandfather     Skin cancer Brother     Prostate cancer Brother 74    Colon polyps Neg Hx     Colon cancer Neg Hx     reviewed    Meds:  Current Facility-Administered Medications   Medication Dose Route Frequency Provider Last Rate Last Admin    acetaminophen (TYLENOL) tablet 650 mg  650 mg Oral Q6H PRN Luis Cheung MD   650 mg at 04/14/24 0335    albuterol (PROVENTIL HFA,VENTOLIN HFA) inhaler 2 puff  2 puff Inhalation Q6H PRN Luis Cheung MD        aspirin tablet 325 mg  325 mg Oral Daily Luis Cheung MD        atorvastatin (LIPITOR) tablet 40 mg  40 mg Oral Daily With Dinner Luis Cheung MD   40 mg at 04/13/24 1856    azithromycin (ZITHROMAX) 500 mg in sodium chloride 0.9% 250mL IVPB 500 mg  500 mg Intravenous  Q24H Luis Cheung MD        cefTRIAXone (ROCEPHIN) IVPB (premix in dextrose) 1,000 mg 50 mL  1,000 mg Intravenous Q24H Luis Cheung MD        citalopram (CeleXA) tablet 20 mg  20 mg Oral Daily Luis Cheung MD        enoxaparin (LOVENOX) subcutaneous injection 40 mg  40 mg Subcutaneous Daily Luis Cheung MD        guaiFENesin (MUCINEX) 12 hr tablet 600 mg  600 mg Oral BID Luis Cheung MD   600 mg at 04/13/24 1856    levothyroxine tablet 75 mcg  75 mcg Oral Early Morning Luis Cheung MD   75 mcg at 04/14/24 0538    ondansetron (ZOFRAN) injection 4 mg  4 mg Intravenous Q6H PRN Luis Cheung MD        pantoprazole (PROTONIX) EC tablet 40 mg  40 mg Oral Early Morning Luis Cheung MD   40 mg at 04/14/24 0539    predniSONE tablet 20 mg  20 mg Oral Daily Luis Cheung MD        sodium chloride 0.9 % infusion  75 mL/hr Intravenous Continuous Luis Cheung MD 75 mL/hr at 04/14/24 0547 75 mL/hr at 04/14/24 0547    ursodiol (ACTIGALL) capsule 300 mg  300 mg Oral TID Luis Cheung MD          Medications Prior to Admission   Medication    amLODIPine (NORVASC) 5 mg tablet    valsartan (DIOVAN) 320 MG tablet    albuterol (PROVENTIL HFA,VENTOLIN HFA) 90 mcg/act inhaler    Calcium Carbonate-Vit D-Min (Calcium 600+D Plus Minerals) 600-400 MG-UNIT TABS    CALCIUM-VITAMIN D PO    citalopram (CeleXA) 20 mg tablet    fexofenadine (ALLEGRA) 180 MG tablet    ibuprofen (MOTRIN) 200 mg tablet    Multiple Vitamin (multivitamin) tablet    NON FORMULARY    Omega-3 Fatty Acids (OMEGA-3 FISH OIL PO)    omeprazole (PriLOSEC) 20 mg delayed release capsule    predniSONE 20 mg tablet    PreviDent 5000 Dry Mouth 1.1 % GEL    Sodium Fluoride 5000 PPM 1.1 % PSTE    Synthroid 75 MCG tablet    TURMERIC PO    ursodiol (ACTIGALL) 300 mg capsule       Labs:  Results from last 7 days   Lab Units 04/14/24  0344 04/13/24  1335   WBC Thousand/uL 12.50* 15.52*   HEMOGLOBIN g/dL 11.8 13.1   HEMATOCRIT % 37.3 39.9  "  PLATELETS Thousands/uL 118* 113*   SEGS PCT % 77* 85*   LYMPHO PCT % 13* 8*   MONO PCT % 8 5   EOS PCT % 1 1     Results from last 7 days   Lab Units 04/14/24  0344 04/13/24  1335   POTASSIUM mmol/L 4.0 3.7   CHLORIDE mmol/L 105 102   CO2 mmol/L 27 27   BUN mg/dL 17 21   CREATININE mg/dL 0.82 0.93   CALCIUM mg/dL 8.5 9.4   ALK PHOS U/L 107* 113*   ALT U/L 70* 93*   AST U/L 39 55*     No results found for: \"TROPONINI\", \"CKMB\", \"CKTOTAL\"  Results from last 7 days   Lab Units 04/13/24  1335   INR  0.84     Lab Results   Component Value Date    BLOODCX Received in Microbiology Lab. Culture in Progress. 04/13/2024    BLOODCX Received in Microbiology Lab. Culture in Progress. 04/13/2024         Imaging:  Results for orders placed during the hospital encounter of 04/13/24    XR chest portable    Narrative  XR CHEST PORTABLE    INDICATION: right pain. Pain under right breast.    COMPARISON: Chest CT 4/13/2024, CXR 7/24/2023.    FINDINGS:    Right middle lobe pneumonia. No pneumothorax or pleural effusion.    Normal cardiomediastinal silhouette.    Bones are unremarkable for age.    Normal upper abdomen.    Impression  Right middle lobe pneumonia.        Workstation performed: [id number]    Results for orders placed during the hospital encounter of 07/24/23    XR chest pa & lateral    Narrative  CHEST    INDICATION:   J18.9: Pneumonia, unspecified organism.    COMPARISON:  None    EXAM PERFORMED/VIEWS:  XR CHEST PA & LATERAL The frontal view was performed utilizing dual energy radiographic technique.  Images: 4    FINDINGS:    Cardiomediastinal silhouette appears unremarkable.    The lungs are clear.  No pneumothorax or pleural effusion.  No focal consolidation.    Age-appropriate degenerative changes are noted in the spine.    Impression  No acute cardiopulmonary disease. No focal consolidation to indicate pneumonia.            Resident: SUBHA Patel I, the attending radiologist, have reviewed the images and agree with " the final report above.    Workstation performed: [id number]      Last 24 Hours Medication List:   Current Facility-Administered Medications   Medication Dose Route Frequency Provider Last Rate    acetaminophen  650 mg Oral Q6H PRN Luis Cheung MD      albuterol  2 puff Inhalation Q6H PRN Luis Cheung MD      aspirin  325 mg Oral Daily Luis Cheung MD      atorvastatin  40 mg Oral Daily With Dinner Luis Cheung MD      azithromycin  500 mg Intravenous Q24H Luis Cheung MD      cefTRIAXone  1,000 mg Intravenous Q24H Luis Cheung MD      citalopram  20 mg Oral Daily Luis Cheung MD      enoxaparin  40 mg Subcutaneous Daily Luis Cheung MD      guaiFENesin  600 mg Oral BID Luis Cheung MD      levothyroxine  75 mcg Oral Early Morning Luis Cheung MD      ondansetron  4 mg Intravenous Q6H PRN Luis Cheung MD      pantoprazole  40 mg Oral Early Morning Luis Cheung MD      predniSONE  20 mg Oral Daily Luis Cheung MD      sodium chloride  75 mL/hr Intravenous Continuous Luis Cheung MD 75 mL/hr (04/14/24 3192)    ursodiol  300 mg Oral TID Luis Cheung MD          Today, Patient Was Seen By: Luis Cheung MD    ** Please Note: Dictation voice to text software may have been used in the creation of this document. **

## 2024-04-14 NOTE — ASSESSMENT & PLAN NOTE
Patient noted to have elevated troponin  Initial troponin [id number]  EKG did not reveal acute ischemic changes  Monitor on telemetry  Aspirin and statin  Cardiology consult  Echocardiogram

## 2024-04-14 NOTE — ASSESSMENT & PLAN NOTE
Patient admitted with right-sided pleuritic chest pain  Possibly in setting of pneumonia  CT PE study showed no PE  Pain management as needed

## 2024-04-15 ENCOUNTER — APPOINTMENT (INPATIENT)
Dept: NON INVASIVE DIAGNOSTICS | Facility: HOSPITAL | Age: 69
DRG: 194 | End: 2024-04-15
Payer: MEDICARE

## 2024-04-15 VITALS
OXYGEN SATURATION: 96 % | SYSTOLIC BLOOD PRESSURE: 136 MMHG | BODY MASS INDEX: 29.99 KG/M2 | TEMPERATURE: 97.8 F | WEIGHT: 180 LBS | DIASTOLIC BLOOD PRESSURE: 66 MMHG | HEIGHT: 65 IN | RESPIRATION RATE: 18 BRPM | HEART RATE: 62 BPM

## 2024-04-15 LAB
ALBUMIN SERPL BCP-MCNC: 3.2 G/DL (ref 3.5–5)
ALP SERPL-CCNC: 120 U/L (ref 34–104)
ALT SERPL W P-5'-P-CCNC: 137 U/L (ref 7–52)
ANION GAP SERPL CALCULATED.3IONS-SCNC: 6 MMOL/L (ref 4–13)
AORTIC ROOT: 2.8 CM
AORTIC VALVE MEAN VELOCITY: 13.3 M/S
APICAL FOUR CHAMBER EJECTION FRACTION: 63 %
ASCENDING AORTA: 3.2 CM
AST SERPL W P-5'-P-CCNC: 86 U/L (ref 13–39)
AV AREA BY CONTINUOUS VTI: 1.7 CM2
AV AREA PEAK VELOCITY: 1.8 CM2
AV LVOT MEAN GRADIENT: 2 MMHG
AV LVOT PEAK GRADIENT: 5 MMHG
AV MEAN GRADIENT: 8 MMHG
AV PEAK GRADIENT: 15 MMHG
AV VALVE AREA: 1.68 CM2
AV VELOCITY RATIO: 0.58
BASOPHILS # BLD AUTO: 0.02 THOUSANDS/ÂΜL (ref 0–0.1)
BASOPHILS NFR BLD AUTO: 0 % (ref 0–1)
BILIRUB SERPL-MCNC: 0.48 MG/DL (ref 0.2–1)
BSA FOR ECHO PROCEDURE: 1.89 M2
BUN SERPL-MCNC: 13 MG/DL (ref 5–25)
CALCIUM ALBUM COR SERPL-MCNC: 8.7 MG/DL (ref 8.3–10.1)
CALCIUM SERPL-MCNC: 8.1 MG/DL (ref 8.4–10.2)
CHLORIDE SERPL-SCNC: 109 MMOL/L (ref 96–108)
CO2 SERPL-SCNC: 23 MMOL/L (ref 21–32)
CREAT SERPL-MCNC: 0.73 MG/DL (ref 0.6–1.3)
DOP CALC AO PEAK VEL: 1.92 M/S
DOP CALC AO VTI: 46.38 CM
DOP CALC LVOT AREA: 3.14 CM2
DOP CALC LVOT CARDIAC INDEX: 3.13 L/MIN/M2
DOP CALC LVOT CARDIAC OUTPUT: 5.91 L/MIN
DOP CALC LVOT DIAMETER: 2 CM
DOP CALC LVOT PEAK VEL VTI: 24.88 CM
DOP CALC LVOT PEAK VEL: 1.11 M/S
DOP CALC LVOT STROKE INDEX: 40.2 ML/M2
DOP CALC LVOT STROKE VOLUME: 78.12
E WAVE DECELERATION TIME: 174 MS
E/A RATIO: 0.97
EOSINOPHIL # BLD AUTO: 0.13 THOUSAND/ÂΜL (ref 0–0.61)
EOSINOPHIL NFR BLD AUTO: 2 % (ref 0–6)
ERYTHROCYTE [DISTWIDTH] IN BLOOD BY AUTOMATED COUNT: 12.8 % (ref 11.6–15.1)
FRACTIONAL SHORTENING: 29 (ref 28–44)
GFR SERPL CREATININE-BSD FRML MDRD: 85 ML/MIN/1.73SQ M
GLUCOSE SERPL-MCNC: 105 MG/DL (ref 65–140)
HCT VFR BLD AUTO: 34.8 % (ref 34.8–46.1)
HGB BLD-MCNC: 10.8 G/DL (ref 11.5–15.4)
IMM GRANULOCYTES # BLD AUTO: 0.07 THOUSAND/UL (ref 0–0.2)
IMM GRANULOCYTES NFR BLD AUTO: 1 % (ref 0–2)
INTERVENTRICULAR SEPTUM IN DIASTOLE (PARASTERNAL SHORT AXIS VIEW): 1.1 CM
INTERVENTRICULAR SEPTUM: 1.1 CM (ref 0.6–1.1)
LAAS-AP2: 16.8 CM2
LAAS-AP4: 14.2 CM2
LEFT ATRIUM SIZE: 3.7 CM
LEFT ATRIUM VOLUME (MOD BIPLANE): 41 ML
LEFT ATRIUM VOLUME INDEX (MOD BIPLANE): 21.7 ML/M2
LEFT INTERNAL DIMENSION IN SYSTOLE: 2.7 CM (ref 2.1–4)
LEFT VENTRICLE DIASTOLIC VOLUME (MOD BIPLANE): 135 ML
LEFT VENTRICLE DIASTOLIC VOLUME INDEX (MOD BIPLANE): 71.4 ML/M2
LEFT VENTRICLE SYSTOLIC VOLUME (MOD BIPLANE): 60 ML
LEFT VENTRICLE SYSTOLIC VOLUME INDEX (MOD BIPLANE): 31.7 ML/M2
LEFT VENTRICULAR INTERNAL DIMENSION IN DIASTOLE: 3.8 CM (ref 3.5–6)
LEFT VENTRICULAR POSTERIOR WALL IN END DIASTOLE: 1.2 CM
LEFT VENTRICULAR STROKE VOLUME: 36 ML
LV EF: 56 %
LVSV (TEICH): 36 ML
LYMPHOCYTES # BLD AUTO: 1.78 THOUSANDS/ÂΜL (ref 0.6–4.47)
LYMPHOCYTES NFR BLD AUTO: 22 % (ref 14–44)
MCH RBC QN AUTO: 29 PG (ref 26.8–34.3)
MCHC RBC AUTO-ENTMCNC: 31 G/DL (ref 31.4–37.4)
MCV RBC AUTO: 94 FL (ref 82–98)
MONOCYTES # BLD AUTO: 0.55 THOUSAND/ÂΜL (ref 0.17–1.22)
MONOCYTES NFR BLD AUTO: 7 % (ref 4–12)
MV E'TISSUE VEL-LAT: 13 CM/S
MV E'TISSUE VEL-SEP: 15 CM/S
MV PEAK A VEL: 0.75 M/S
MV PEAK E VEL: 73 CM/S
MV STENOSIS PRESSURE HALF TIME: 50 MS
MV VALVE AREA P 1/2 METHOD: 4.4
NEUTROPHILS # BLD AUTO: 5.38 THOUSANDS/ÂΜL (ref 1.85–7.62)
NEUTS SEG NFR BLD AUTO: 68 % (ref 43–75)
NRBC BLD AUTO-RTO: 0 /100 WBCS
PLATELET # BLD AUTO: 136 THOUSANDS/UL (ref 149–390)
PMV BLD AUTO: 10.5 FL (ref 8.9–12.7)
POTASSIUM SERPL-SCNC: 3.8 MMOL/L (ref 3.5–5.3)
PROCALCITONIN SERPL-MCNC: 0.18 NG/ML
PROT SERPL-MCNC: 6.1 G/DL (ref 6.4–8.4)
RA PRESSURE ESTIMATED: 3 MMHG
RBC # BLD AUTO: 3.72 MILLION/UL (ref 3.81–5.12)
RIGHT ATRIUM AREA SYSTOLE A4C: 13 CM2
RIGHT VENTRICLE ID DIMENSION: 3.8 CM
RV PSP: 27 MMHG
SL CV LEFT ATRIUM LENGTH A2C: 4.6 CM
SL CV LV EF: 60
SL CV PED ECHO LEFT VENTRICLE DIASTOLIC VOLUME (MOD BIPLANE) 2D: 63 ML
SL CV PED ECHO LEFT VENTRICLE SYSTOLIC VOLUME (MOD BIPLANE) 2D: 27 ML
SODIUM SERPL-SCNC: 138 MMOL/L (ref 135–147)
TR MAX PG: 24 MMHG
TR PEAK VELOCITY: 2.5 M/S
TRICUSPID ANNULAR PLANE SYSTOLIC EXCURSION: 1.9 CM
TRICUSPID VALVE PEAK REGURGITATION VELOCITY: 2.47 M/S
WBC # BLD AUTO: 7.93 THOUSAND/UL (ref 4.31–10.16)

## 2024-04-15 PROCEDURE — 93306 TTE W/DOPPLER COMPLETE: CPT | Performed by: INTERNAL MEDICINE

## 2024-04-15 PROCEDURE — 99239 HOSP IP/OBS DSCHRG MGMT >30: CPT | Performed by: STUDENT IN AN ORGANIZED HEALTH CARE EDUCATION/TRAINING PROGRAM

## 2024-04-15 PROCEDURE — 85025 COMPLETE CBC W/AUTO DIFF WBC: CPT | Performed by: INTERNAL MEDICINE

## 2024-04-15 PROCEDURE — 84145 PROCALCITONIN (PCT): CPT | Performed by: INTERNAL MEDICINE

## 2024-04-15 PROCEDURE — 93306 TTE W/DOPPLER COMPLETE: CPT

## 2024-04-15 PROCEDURE — 80053 COMPREHEN METABOLIC PANEL: CPT | Performed by: INTERNAL MEDICINE

## 2024-04-15 RX ORDER — GUAIFENESIN 600 MG/1
600 TABLET, EXTENDED RELEASE ORAL 2 TIMES DAILY
Qty: 30 TABLET | Refills: 0 | Status: SHIPPED | OUTPATIENT
Start: 2024-04-15 | End: 2024-04-30

## 2024-04-15 RX ORDER — CEFDINIR 300 MG/1
300 CAPSULE ORAL EVERY 12 HOURS SCHEDULED
Qty: 8 CAPSULE | Refills: 0 | Status: SHIPPED | OUTPATIENT
Start: 2024-04-15 | End: 2024-04-19

## 2024-04-15 RX ORDER — ACETAMINOPHEN 325 MG/1
650 TABLET ORAL EVERY 6 HOURS PRN
Qty: 15 TABLET | Refills: 0 | Status: SHIPPED | OUTPATIENT
Start: 2024-04-15

## 2024-04-15 RX ORDER — ATORVASTATIN CALCIUM 40 MG/1
40 TABLET, FILM COATED ORAL
Qty: 30 TABLET | Refills: 0 | Status: SHIPPED | OUTPATIENT
Start: 2024-04-15 | End: 2024-05-15

## 2024-04-15 RX ADMIN — AZITHROMYCIN 500 MG: 500 INJECTION, POWDER, LYOPHILIZED, FOR SOLUTION INTRAVENOUS at 10:51

## 2024-04-15 RX ADMIN — GUAIFENESIN 600 MG: 600 TABLET ORAL at 10:43

## 2024-04-15 RX ADMIN — ASPIRIN 325 MG ORAL TABLET 325 MG: 325 PILL ORAL at 10:43

## 2024-04-15 RX ADMIN — SODIUM CHLORIDE 75 ML/HR: 0.9 INJECTION, SOLUTION INTRAVENOUS at 07:31

## 2024-04-15 RX ADMIN — ENOXAPARIN SODIUM 40 MG: 40 INJECTION SUBCUTANEOUS at 10:43

## 2024-04-15 RX ADMIN — PREDNISONE 20 MG: 20 TABLET ORAL at 10:43

## 2024-04-15 RX ADMIN — URSODIOL 300 MG: 300 CAPSULE ORAL at 10:43

## 2024-04-15 RX ADMIN — LEVOTHYROXINE SODIUM 75 MCG: 75 TABLET ORAL at 05:20

## 2024-04-15 RX ADMIN — CITALOPRAM 20 MG: 20 TABLET, FILM COATED ORAL at 10:43

## 2024-04-15 RX ADMIN — PANTOPRAZOLE SODIUM 40 MG: 40 TABLET, DELAYED RELEASE ORAL at 07:31

## 2024-04-15 NOTE — ASSESSMENT & PLAN NOTE
Follows up with Dr. Prabhakar as outpatient  On prednisone 20 mg daily and is scheduled to be started on Imuran as outpatient  CMP in 1 week

## 2024-04-15 NOTE — UTILIZATION REVIEW
Initial Clinical Review    Admission: Date/Time/Statement:   Admission Orders (From admission, onward)       Ordered        04/13/24 1715  INPATIENT ADMISSION  Once                          Orders Placed This Encounter   Procedures    INPATIENT ADMISSION     Standing Status:   Standing     Number of Occurrences:   1     Order Specific Question:   Level of Care     Answer:   Med Surg [16]     Order Specific Question:   Estimated length of stay     Answer:   More than 2 Midnights     Order Specific Question:   Certification     Answer:   I certify that inpatient services are medically necessary for this patient for a duration of greater than two midnights. See H&P and MD Progress Notes for additional information about the patient's course of treatment.     ED Arrival Information       Expected   -    Arrival   4/13/2024 13:01    Acuity   Emergent              Means of arrival   Walk-In    Escorted by   Spouse    Service   Hospitalist    Admission type   Emergency              Arrival complaint   chills, pain in right lung on breathing             Chief Complaint   Patient presents with    Weakness - Generalized     Pt to ED c/o weakness & chest pain under L breast and vomiting. States pain seems to be associated with her breathing, this has happened before and states it was pneumonia.        Initial Presentation: 69 y.o. female to ED presents for right-sided pleuritic chest pain associated with nausea and 1 episode of vomiting, shortness of breath, chills, cough and generalized weakness. Pt states that she has been feeling weak for the past 1 week and has been having cough with some productive phlegm for few days. She noticed right-sided chest pain which is under her breast and worse with breathing and associated with nausea and 1 episode of vomiting this am. Had similar symptoms 1 yr ago and was found to have pneumonia. Given Iv antibiotics in ED. PMH for Sjogren's syndrome, autoimmune hepatitis, thrombocytopenia,  hypothyroidism.   Admit Inpatient level of care for Pneumonia, Pleuritic chest pain, Elevated troponin. Initial troponin [id number]. Trend troponin. Tele monitoring. Aspirin and statin. Echo. Cardiology consult. EKG did not reveal acute ischemic changes. Iv antibiotics. Bld culture. Sputum culture and urine Legionella and Streptococcus antigen. CT PE study. Trend Cbc.     Date: 4/14  Day 2:   Progress notes; Continue Iv antibiotics. Bld and sputum cultures pending.   Chest pain improved.     Pulmonology cons; Pneumonia. Medically induced immunocompromise state (chronic prednisone). CT chest abnormality. Elevated troponins.  Continue Iv antibiotics. Recommend repeat CT chest 6 weeks from initial CT chest.     Cardiology cons; Non Mi troponin elevation. troponin elevation is likely secondary to her pneumonia. Resting EKG is normal.  Check Echo tomorrow 4/15. Right sided chest discomfort subsided.         ED Triage Vitals [04/13/24 1306]   Temperature Pulse Respirations Blood Pressure SpO2   98.4 °F (36.9 °C) 78 18 153/66 95 %      Temp Source Heart Rate Source Patient Position - Orthostatic VS BP Location FiO2 (%)   Oral Monitor Sitting Left arm --      Pain Score       7          Wt Readings from Last 1 Encounters:   04/13/24 81.6 kg (180 lb)     Additional Vital Signs:   04/14/24 15:22:50 97.9 °F (36.6 °C) 69 14 126/65 85 96 % None (Room air) Lying   04/14/24 0925 -- -- -- -- -- 96 % None (Room air) --   04/14/24 07:19:49 98.9 °F (37.2 °C) 68 18 123/64 84 93 % -- --   04/13/24 20:48:37 98 °F (36.7 °C) 70 18 116/64 81 96 % None (Room air) Sitting   04/13/24 1700 -- 78 20 122/56 80 92 % None (Room air) Sitting   04/13/24 1600 -- 78 20 115/56 78 93 % None (Room air) Sitting   04/13/24 1530 -- 77 18 112/58 82 92 % None (Room air) Sitting   04/13/24 1430 -- 79 18 129/63 90 93 % None (Room air) Sitting     Pertinent Labs/Diagnostic Test Results:   CT pe study w abdomen pelvis w contrast   Final Result by Deyanira Vargas,  MD (04/13 1913)      No pulmonary embolism seen   .   Area of consolidation right middle lobe with adjacent groundglass density, suggest pneumonia         A nodular appearing density at the lateral aspect of the left lower lobe abutting the pleura measuring 2 cm x 1.3 cm, indeterminate may be additional consolidation related to pneumonia      However follow-up at 6 to 8 weeks suggested to exclude any pulmonary nodule and parenchymal lesion      Mild branching densities in the lungs suggest associated infectious bronchiolitis no acute inflammatory stranding in the abdominal pelvis   No no bowel obstruction   Stable splenic lesion      The study was marked in EPIC for immediate notification.         Workstation performed: [id number]         XR chest portable   ED Interpretation by Fidel Monte DO (04/13 1455)   Subtle opacity right lower lung interpreted by me, no ptx, no effusion      Final Result by Amanda Escalera MD (04/13 1817)      Right middle lobe pneumonia.            Workstation performed: [id number]           Results from last 7 days   Lab Units 04/13/24  1335   SARS-COV-2  Negative     Results from last 7 days   Lab Units 04/14/24  0344 04/13/24  1335   WBC Thousand/uL 12.50* 15.52*   HEMOGLOBIN g/dL 11.8 13.1   HEMATOCRIT % 37.3 39.9   PLATELETS Thousands/uL 118* 113*   TOTAL NEUT ABS Thousands/µL 9.67* 13.24*         Results from last 7 days   Lab Units 04/14/24  0344 04/13/24  1335   SODIUM mmol/L 138 138   POTASSIUM mmol/L 4.0 3.7   CHLORIDE mmol/L 105 102   CO2 mmol/L 27 27   ANION GAP mmol/L 6 9   BUN mg/dL 17 21   CREATININE mg/dL 0.82 0.93   EGFR ml/min/1.73sq m 74 63   CALCIUM mg/dL 8.5 9.4   MAGNESIUM mg/dL 2.0  --    PHOSPHORUS mg/dL 3.9  --      Results from last 7 days   Lab Units 04/14/24  0344 04/13/24  1335   AST U/L 39 55*   ALT U/L 70* 93*   ALK PHOS U/L 107* 113*   TOTAL PROTEIN g/dL 6.3* 7.5   ALBUMIN g/dL 3.4* 4.3   TOTAL BILIRUBIN mg/dL 0.87 0.86         Results from last 7 days    Lab Units 04/14/24  0344 04/13/24  1335   GLUCOSE RANDOM mg/dL 96 94       Results from last 7 days   Lab Units 04/13/24  1749 04/13/24  1546 04/13/24  1335   HS TNI 0HR ng/L  --   --  1,368*   HS TNI 2HR ng/L  --  1,227*  --    HSTNI D2 ng/L  --  -141  --    HS TNI 4HR ng/L 1,301*  --   --    HSTNI D4 ng/L -67  --   --      Results from last 7 days   Lab Units 04/13/24  1335   D-DIMER QUANTITATIVE ug/ml FEU 0.68*     Results from last 7 days   Lab Units 04/13/24  1335   PROTIME seconds 11.9   INR  0.84   PTT seconds 27         Results from last 7 days   Lab Units 04/15/24  0521 04/14/24  0344 04/13/24  1335   PROCALCITONIN ng/ml 0.18 0.28* 0.05     Results from last 7 days   Lab Units 04/13/24  1335   LACTIC ACID mmol/L 1.6             Results from last 7 days   Lab Units 04/13/24  1335   BNP pg/mL 88       Results from last 7 days   Lab Units 04/13/24  1335   LIPASE u/L 21       Results from last 7 days   Lab Units 04/13/24  1752   CLARITY UA  Clear   COLOR UA  Yellow   SPEC GRAV UA  <1.005*   PH UA  6.5   GLUCOSE UA mg/dl Negative   KETONES UA mg/dl Negative   BLOOD UA  Negative   PROTEIN UA mg/dl 30 (1+)*   NITRITE UA  Negative   BILIRUBIN UA  Negative   UROBILINOGEN UA (BE) mg/dl <2.0   LEUKOCYTES UA  Negative   WBC UA /hpf 1-2   RBC UA /hpf 0-1   BACTERIA UA /hpf Occasional   EPITHELIAL CELLS WET PREP /hpf Occasional   MUCUS THREADS  Occasional*     Results from last 7 days   Lab Units 04/13/24  1751 04/13/24  1335   STREP PNEUMONIAE ANTIGEN, URINE  Negative  --    LEGIONELLA URINARY ANTIGEN  Negative  --    INFLUENZA A PCR   --  Negative   INFLUENZA B PCR   --  Negative   RSV PCR   --  Negative           Results from last 7 days   Lab Units 04/13/24  1353 04/13/24  1335   BLOOD CULTURE  No Growth at 24 hrs. No Growth at 24 hrs.         ED Treatment:   Medication Administration from 04/13/2024 1301 to 04/13/2024 1818         Date/Time Order Dose Route Action     04/13/2024 1353 EDT acetaminophen (TYLENOL)  tablet 975 mg 975 mg Oral Given     04/13/2024 1505 EDT cefTRIAXone (ROCEPHIN) IVPB (premix in dextrose) 1,000 mg 50 mL 1,000 mg Intravenous New Bag     04/13/2024 1540 EDT azithromycin (ZITHROMAX) 500 mg in sodium chloride 0.9% 250mL IVPB 500 mg 500 mg Intravenous New Bag     04/13/2024 1632 EDT aspirin chewable tablet 324 mg 324 mg Oral Given     04/13/2024 1724 EDT iohexol (OMNIPAQUE) 350 MG/ML injection (SINGLE-DOSE) 100 mL 100 mL Intravenous Given     04/13/2024 1753 EDT sodium chloride 0.9 % infusion 75 mL/hr Intravenous New Bag          Past Medical History:   Diagnosis Date    Anemia 1977    I stopped taking iron supplements after I went through menop    Disease of thyroid gland     Hypothyroid     Sjogren's disease (HCC)      Present on Admission:   Sjogren's disease (HCC)   Other specified hypothyroidism   Thrombocytopenia (HCC)      Admitting Diagnosis: Pleuritic chest pain [R07.81]  Pneumonia [J18.9]  Weakness [R53.1]  Elevated troponin [R79.89]  Age/Sex: 69 y.o. female    Admission Orders:  Scheduled Medications:  aspirin, 325 mg, Oral, Daily  atorvastatin, 40 mg, Oral, Daily With Dinner  azithromycin, 500 mg, Intravenous, Q24H  cefTRIAXone, 1,000 mg, Intravenous, Q24H  citalopram, 20 mg, Oral, Daily  enoxaparin, 40 mg, Subcutaneous, Daily  guaiFENesin, 600 mg, Oral, BID  levothyroxine, 75 mcg, Oral, Early Morning  pantoprazole, 40 mg, Oral, Early Morning  predniSONE, 20 mg, Oral, Daily  ursodiol, 300 mg, Oral, TID      Continuous IV Infusions:     sodium chloride 0.9 % infusion  Rate: 75 mL/hr Dose: 75 mL/hr  Freq: Continuous Route: IV  Indications of Use: IV Hydration  Last Dose: 75 mL/hr (04/15/24 0731)  Start: 04/13/24 1730      PRN Meds:  acetaminophen, 650 mg, Oral, Q6H PRN  albuterol, 2 puff, Inhalation, Q6H PRN  ondansetron, 4 mg, Intravenous, Q6H PRN      Continuous pulse ox  Continuous cardiac monitoring  IP CONSULT TO CARDIOLOGY  IP CONSULT TO PULMONOLOGY  IP CONSULT TO CASE  MANAGEMENT    Network Utilization Review Department  ATTENTION: Please call with any questions or concerns to [phone] and carefully listen to the prompts so that you are directed to the right person. All voicemails are confidential.   For Discharge needs, contact Care Management DC Support Team at [phone] opt. [phone]  Send all requests for admission clinical reviews, approved or denied determinations and any other requests to dedicated fax number below belonging to the campus where the patient is receiving treatment. List of dedicated fax numbers for the Facilities:  FACILITY NAME UR FAX NUMBER   ADMISSION DENIALS (Administrative/Medical Necessity) [phone]   DISCHARGE SUPPORT TEAM (NETWORK) [phone]   PARENT CHILD HEALTH (Maternity/NICU/Pediatrics) [phone]   [facility] [phone]   [facility] [phone]   [facility] [phone]   [facility] [phone]   [facility] [phone]   [facility] [phone]   [facility] [phone]   [facility] [phone]   [facility] [phone]   [facility], [facility] [phone]   [facility] [phone]   [facility] [phone]

## 2024-04-15 NOTE — ASSESSMENT & PLAN NOTE
Patient noted to have elevated troponin  Initial troponin [id number]  EKG did not reveal acute ischemic changes  Monitor on telemetry  Aspirin and statin  Cardiology consult  Echocardiogram pending  F/u with cardio outpt

## 2024-04-15 NOTE — INCIDENTAL FINDINGS
The following findings require follow up:  Radiographic finding   Finding: CT pe study w abdomen pelvis w contrast: No pulmonary embolism seen, ., Area of consolidation right middle lobe with adjacent groundglass density, suggest pneumonia, A nodular appearing density at the lateral aspect of the left lower lobe abutting the pleura measuring 2 cm x 1.3 cm, indeterminate may be additional consolidation related to pneumonia, However follow-up at 6 to 8 weeks suggested to exclude any pulmonary nodule and parenchymal lesion, Mild branching densities in the lungs suggest associated infectious bronchiolitis no acute inflammatory stranding in the abdominal pelvis, No no bowel obstruction, Stable splenic lesion    Follow up required: yes   Follow up should be done within 6-8 week(s)    Please notify the following clinician to assist with the follow up:   PCP and Pulm

## 2024-04-15 NOTE — ASSESSMENT & PLAN NOTE
"Patient presented with pneumonia as evidenced by cough, chills, shortness of breath and right-sided pleuritic chest pain  Does not meet SIRS criteria  Dimer is 0.68  Lactate is 1.6  COVID/flu/RSV is negative  Pro-Arben is 0.05  CT chest PE study, abdomen pelvis-\"No pulmonary embolism seen.Area of consolidation right middle lobe with adjacent groundglass density, suggest pneumonia. A nodular appearing density at the lateral aspect of the left lower lobe abutting the pleura measuring 2 cm x 1.3 cm, indeterminate may be additional consolidation related to pneumonia.  Mild branching densities in the lungs suggest associated infectious bronchiolitis no acute inflammatory stranding in the abdominal pelvis. No bowel obstruction\"  Rocephin transitioned to 3 more days of cefdinir  Completed Zithromax  Urine Legionella and Streptococcus antigen is negative  blood culture NGTD @ 24h  Trend WBC and fever curve  "

## 2024-04-15 NOTE — DISCHARGE INSTR - AVS FIRST PAGE
You are to follow-up with your PCP next week    You are to follow-up with pulmonology    You are to follow-up with cardiology    Would recommend that you follow-up with your PCP for blood pressure medications.  Your blood pressure has been fine here in the hospital.  Would defer whether or not to continue Norvasc or Diovan with your PCP

## 2024-04-15 NOTE — PLAN OF CARE
Problem: PAIN - ADULT  Goal: Verbalizes/displays adequate comfort level or baseline comfort level  Description: Interventions:  - Encourage patient to monitor pain and request assistance  - Assess pain using appropriate pain scale  - Administer analgesics based on type and severity of pain and evaluate response  - Implement non-pharmacological measures as appropriate and evaluate response  - Consider cultural and social influences on pain and pain management  - Notify physician/advanced practitioner if interventions unsuccessful or patient reports new pain  Outcome: Progressing     Problem: SAFETY ADULT  Goal: Patient will remain free of falls  Description: INTERVENTIONS:  - Educate patient/family on patient safety including physical limitations  - Instruct patient to call for assistance with activity   - Consult OT/PT to assist with strengthening/mobility   - Keep Call bell within reach  - Keep bed low and locked with side rails adjusted as appropriate  - Keep care items and personal belongings within reach  - Initiate and maintain comfort rounds  - Make Fall Risk Sign visible to staff  - Offer Toileting every 2 Hours, in advance of need  - Obtain necessary fall risk management equipment:   - Apply yellow socks and bracelet for high fall risk patients  - Consider moving patient to room near nurses station  Outcome: Progressing     Problem: CARDIOVASCULAR - ADULT  Goal: Maintains optimal cardiac output and hemodynamic stability  Description: INTERVENTIONS:  - Monitor I/O, vital signs and rhythm  - Monitor for S/S and trends of decreased cardiac output  - Administer and titrate ordered vasoactive medications to optimize hemodynamic stability  - Assess quality of pulses, skin color and temperature  - Assess for signs of decreased coronary artery perfusion  - Instruct patient to report change in severity of symptoms  Outcome: Progressing     Problem: RESPIRATORY - ADULT  Goal: Achieves optimal ventilation and  "Caller states I had some menstrual cramping and crushed a naprosyn. Am I going to be ok? Advised per guideline.     Reason for Disposition  • Caller has medication question only, adult not sick, and triager answers question    Additional Information  • Negative: Drug overdose and nurse unable to answer question  • Negative: Caller requesting information not related to medicine  • Negative: Caller requesting a prescription for Strep throat and has a positive culture result  • Negative: Rash while taking a medication or within 3 days of stopping it  • Negative: Immunization reaction suspected  • Negative: [1] Asthma and [2] having symptoms of asthma (cough, wheezing, etc)  • Negative: MORE THAN A DOUBLE DOSE of a prescription or over-the-counter (OTC) drug  • Negative: [1] DOUBLE DOSE (an extra dose or lesser amount) of over-the-counter (OTC) drug AND [2] any symptoms (e.g., dizziness, nausea, pain, sleepiness)  • Negative: [1] DOUBLE DOSE (an extra dose or lesser amount) of prescription drug AND [2] any symptoms (e.g., dizziness, nausea, pain, sleepiness)  • Negative: Took another person's prescription drug  • Negative: [1] DOUBLE DOSE (an extra dose or lesser amount) of prescription drug AND [2] NO symptoms (Exception: a double dose of antibiotics)  • Negative: Diabetes drug error or overdose (e.g., insulin or extra dose)  • Negative: [1] Request for URGENT new prescription or refill of \"essential\" medication (i.e., likelihood of harm to patient if not taken) AND [2] triager unable to fill per unit policy  • Negative: [1] Prescription not at pharmacy AND [2] was prescribed today by PCP  • Negative: Pharmacy calling with prescription questions and triager unable to answer question  • Negative: Caller has URGENT medication question about med that PCP prescribed and triager unable to answer question  • Negative: Caller has NON-URGENT medication question about med that PCP prescribed and triager unable to answer " "question  • Negative: Caller requesting a NON-URGENT new prescription or refill and triager unable to refill per unit policy  • Negative: Caller has medication question about med not prescribed by PCP and triager unable to answer question (e.g., compatibility with other med, storage)  • Negative: [1] DOUBLE DOSE (an extra dose or lesser amount) of over-the-counter (OTC) drug AND [2] NO symptoms  • Negative: [1] DOUBLE DOSE (an extra dose or lesser amount) of antibiotic drug AND [2] NO symptoms    Answer Assessment - Initial Assessment Questions  1. SYMPTOMS: \"Do you have any symptoms?\"       Mild menstrual cramping   2. SEVERITY: If symptoms are present, ask \"Are they mild, moderate or severe?\"      Mild. I took a naprosyn and crushed it is that ok?    Protocols used: MEDICATION QUESTION CALL-ADULT-      " oxygenation  Description: INTERVENTIONS:  - Assess for changes in respiratory status  - Assess for changes in mentation and behavior  - Position to facilitate oxygenation and minimize respiratory effort  - Oxygen administered by appropriate delivery if ordered  - Initiate smoking cessation education as indicated  - Encourage broncho-pulmonary hygiene including cough, deep breathe, Incentive Spirometry  - Assess the need for suctioning and aspirate as needed  - Assess and instruct to report SOB or any respiratory difficulty  - Respiratory Therapy support as indicated  Outcome: Progressing

## 2024-04-15 NOTE — DISCHARGE SUMMARY
"[facility]  Discharge- Estella Stein 1955, 69 y.o. female MRN: [id number]  Unit/Bed#: -[facility] Encounter: [id number]  Primary Care Provider: Louisa Pendleton DO   Date and time admitted to hospital: 4/13/2024  1:07 PM    * Pneumonia of right middle lobe due to infectious organism  Assessment & Plan  Patient presented with pneumonia as evidenced by cough, chills, shortness of breath and right-sided pleuritic chest pain  Does not meet SIRS criteria  Dimer is 0.68  Lactate is 1.6  COVID/flu/RSV is negative  Pro-Arben is 0.05  CT chest PE study, abdomen pelvis-\"No pulmonary embolism seen.Area of consolidation right middle lobe with adjacent groundglass density, suggest pneumonia. A nodular appearing density at the lateral aspect of the left lower lobe abutting the pleura measuring 2 cm x 1.3 cm, indeterminate may be additional consolidation related to pneumonia.  Mild branching densities in the lungs suggest associated infectious bronchiolitis no acute inflammatory stranding in the abdominal pelvis. No bowel obstruction\"  Rocephin transitioned to 3 more days of cefdinir  Completed Zithromax  Urine Legionella and Streptococcus antigen is negative  blood culture NGTD @ 24h  Trend WBC and fever curve    Pleuritic chest pain  Assessment & Plan  Patient admitted with right-sided pleuritic chest pain  Possibly in setting of pneumonia  CT PE study showed no PE  Pain management as needed      Elevated troponin  Assessment & Plan  Patient noted to have elevated troponin  Initial troponin [id number]  EKG did not reveal acute ischemic changes  Monitor on telemetry  Aspirin and statin  Cardiology consult  Echocardiogram pending  F/u with cardio outpt    Autoimmune hepatitis (HCC)  Assessment & Plan  Follows up with Dr. Prabhakar as outpatient  On prednisone 20 mg daily and is scheduled to be started on Imuran as outpatient  CMP in 1 week    Thrombocytopenia (HCC)  Assessment & " Plan  Has history of thrombocytopenia  Follows up with hematology as outpatient  Platelets on admission is 113 K  Stable  Trend CBC    Sjogren's disease (HCC)  Assessment & Plan  Follows up with rheumatology as outpatient  Stable    Other specified hypothyroidism  Assessment & Plan  On Synthroid        Medical Problems       Resolved Problems  Date Reviewed: 4/14/2024   None       Discharging Physician / Practitioner: Pauly Valderrama MD  PCP: Louisa Pendleton DO  Admission Date:   Admission Orders (From admission, onward)       Ordered        04/13/24 1715  INPATIENT ADMISSION  Once                          Discharge Date: 04/15/24    Consultations During Hospital Stay:  Cardio  Pulm  CM      Procedures Performed:   CT pe study w abdomen pelvis w contrast   Final Result      No pulmonary embolism seen   .   Area of consolidation right middle lobe with adjacent groundglass density, suggest pneumonia         A nodular appearing density at the lateral aspect of the left lower lobe abutting the pleura measuring 2 cm x 1.3 cm, indeterminate may be additional consolidation related to pneumonia      However follow-up at 6 to 8 weeks suggested to exclude any pulmonary nodule and parenchymal lesion      Mild branching densities in the lungs suggest associated infectious bronchiolitis no acute inflammatory stranding in the abdominal pelvis   No no bowel obstruction   Stable splenic lesion      The study was marked in EPIC for immediate notification.         Workstation performed: [id number]         XR chest portable   ED Interpretation   Subtle opacity right lower lung interpreted by me, no ptx, no effusion      Final Result      Right middle lobe pneumonia.            Workstation performed: [id number]           Echo pending    Significant Findings / Test Results:   See above    Incidental Findings:   See above   I reviewed the above mentioned incidental findings with the patient and/or family and they expressed  "understanding.    Test Results Pending at Discharge (will require follow up):   Echo  BC x2     Outpatient Tests Requested:  CMP 1 week    Complications:   none known at this time    Reason for Admission: PNA    Hospital Course:   Estella Stein is a 69 y.o. female patient who originally presented to the hospital on 4/13/2024 due to generalized weakness chills cough shortness of breath and pleuritic chest pain associated with nausea and vomiting.  In the ED patient was noted to have a right sided pneumonia.  Patient was started on ceftriaxone and azithromycin.  Also on admission patient was noted to have elevated troponins.  Pulmonology and cardiology consulted.  Patient gradually started to improve and pleuritic chest pain improved with Tylenol.  She is otherwise remained hemodynamically stable afebrile in no acute respiratory distress.  She has been medically cleared for discharge.  She is to follow-up with her PCP cardiology and pulmonology.  She will be discharged with 3 more days of cefdinir.      Please see above list of diagnoses and related plan for additional information.     Condition at Discharge: stable    Discharge Day Visit / Exam:   Subjective: Estella was seen and examined at bedside.  No acute events overnight.  Discussed plan of care.  All questions and concerns were answered and addressed.  Has no acute complaints at this time.  States that her right-sided pleuritic chest pain has improved.  Continues to have a cough but nonproductive in nature.  Vitals: Blood Pressure: 137/65 (04/15/24 0757)  Pulse: 68 (04/15/24 0757)  Temperature: 97.8 °F (36.6 °C) (04/15/24 0757)  Temp Source: Oral (04/15/24 0757)  Respirations: 18 (04/15/24 0757)  Height: 5' 5\" (165.1 cm) (04/13/24 2300)  Weight - Scale: 81.6 kg (180 lb) (04/13/24 2300)  SpO2: 96 % (04/15/24 0757)  Exam:   Physical Exam  Vitals and nursing note reviewed.   Constitutional:       General: She is not in acute distress.     Appearance: She is " not ill-appearing.   HENT:      Head: Normocephalic and atraumatic.   Cardiovascular:      Rate and Rhythm: Normal rate and regular rhythm.      Pulses: Normal pulses.      Heart sounds: Normal heart sounds.   Pulmonary:      Effort: Pulmonary effort is normal.      Breath sounds: Normal breath sounds.   Abdominal:      General: Abdomen is flat. Bowel sounds are normal.      Palpations: Abdomen is soft.   Musculoskeletal:      Right lower leg: No edema.      Left lower leg: No edema.   Skin:     General: Skin is warm.   Neurological:      General: No focal deficit present.      Mental Status: She is alert and oriented to person, place, and time.          Discussion with Family: Patient declined call to .     Discharge instructions/Information to patient and family:   See after visit summary for information provided to patient and family.      Provisions for Follow-Up Care:  See after visit summary for information related to follow-up care and any pertinent home health orders.      Mobility at time of Discharge:   Basic Mobility Inpatient Raw Score: 23  JH-HLM Goal: 7: Walk 25 feet or more  JH-HLM Achieved: 6: Walk 10 steps or more  HLM Goal NOT achieved. Continue to encourage mobility in post discharge setting.     Disposition:   Home    Planned Readmission: no     Discharge Statement:  I spent 40 minutes discharging the patient. This time was spent on the day of discharge. I had direct contact with the patient on the day of discharge. Greater than 50% of the total time was spent examining patient, answering all patient questions, arranging and discussing plan of care with patient as well as directly providing post-discharge instructions.  Additional time then spent on discharge activities.    Discharge Medications:  See after visit summary for reconciled discharge medications provided to patient and/or family.      **Please Note: This note may have been constructed using a voice recognition  system**

## 2024-04-16 NOTE — CASE MANAGEMENT
Case Management Progress Note    Patient name Estella Stein  Location /-01 MRN [id number]  : 1955 Date 2024       LOS (days): 2  Geometric Mean LOS (GMLOS) (days):   Days to GMLOS:        OBJECTIVE:        Current admission status: Inpatient  Preferred Pharmacy:   [facility]/pharmacy #[id number] - [place] - [address] [address]  [address] [address]  Phone: [phone] Fax: [phone]    Primary Care Provider: Louisa Pendleton DO    Primary Insurance: MEDICARE  Secondary Insurance: BLUE CROSS    PROGRESS NOTE:    Notification made to OP CM Handoff: [facility] OP CM regarding discharge planning and disposition.   Spoke to Bindu in CM office.

## 2024-04-16 NOTE — CASE MANAGEMENT
Case Management Assessment & Discharge Planning Note    Patient name Estella Stein  Location /-01 MRN [id number]  : 1955 Date 4/15/2024       Current Admission Date: 2024  Current Admission Diagnosis:Pneumonia of right middle lobe due to infectious organism   Patient Active Problem List    Diagnosis Date Noted    Pneumonia of right middle lobe due to infectious organism 2024    Autoimmune hepatitis (HCC) 2024    Elevated troponin 2024    Pleuritic chest pain 2024    Ocular migraine 2024    Thrombocytopenia (HCC) 2024    Other neutropenia (HCC) 2024    Other specified hypothyroidism     Sjogren's disease (HCC)     Elevated liver enzymes 2022      LOS (days): 2  Geometric Mean LOS (GMLOS) (days):   Days to GMLOS:     OBJECTIVE:    Risk of Unplanned Readmission Score: 11.38      Current admission status: Inpatient    Preferred Pharmacy:   [facility]/pharmacy #[id number] - [place] - [address] [address]  [address] [address]  Phone: [phone] Fax: [phone]    Primary Care Provider: Louisa Pendleton DO    Primary Insurance: BLUE CROSS  Secondary Insurance: MEDICARE    ASSESSMENT:  Active Health Care Proxies    There are no active Health Care Proxies on file.       Readmission Root Cause  30 Day Readmission: No    Patient Information  Admitted from:: Home  Mental Status: Alert  During Assessment patient was accompanied by: Not accompanied during assessment  Assessment information provided by:: Patient  Primary Caregiver: Self  Support Systems: Spouse/significant other, Daughter, Friend, Son, Children  County of Residence: [place]  What city do you live in?: [place]  Home entry access options. Select all that apply.: Stairs  Number of steps to enter home.: 2  Type of Current Residence: 2 story home  Upon entering residence, is there a bedroom on the main floor (no further steps)?: Yes  Living Arrangements:  Lives w/ Spouse/significant other    Activities of Daily Living Prior to Admission  Functional Status: Independent  Completes ADLs independently?: Yes  Ambulates independently?: Yes  Does patient use assisted devices?: No  Does patient currently own DME?: No  Does patient have a history of Outpatient Therapy (PT/OT)?: Yes  Does the patient have a history of Short-Term Rehab?: No  Does patient have a history of HHC?: No  Does patient currently have HHC?: No     Patient Information Continued  Income Source: Employed  Does patient receive dialysis treatments?: No  Does patient have a history of substance abuse?: No  Does patient have a history of Mental Health Diagnosis?: No    Means of Transportation  Means of Transport to Appts:: Drives Self      Social Determinants of Health (SDOH)      Flowsheet Row Most Recent Value   Housing Stability    In the last 12 months, was there a time when you were not able to pay the mortgage or rent on time? N   In the last 12 months, how many places have you lived? 1   In the last 12 months, was there a time when you did not have a steady place to sleep or slept in a shelter (including now)? N   Transportation Needs    In the past 12 months, has lack of transportation kept you from medical appointments or from getting medications? no   In the past 12 months, has lack of transportation kept you from meetings, work, or from getting things needed for daily living? No   Food Insecurity    Within the past 12 months, you worried that your food would run out before you got the money to buy more. Never true   Within the past 12 months, the food you bought just didn't last and you didn't have money to get more. Never true            DISCHARGE DETAILS:    Discharge planning discussed with:: pt     Additional Comments: CM spoke with pt regarding dc plan and cm role. Pt lives with spouse in [facility] but has a first floor set up with 2 UDAY. SHe is ind, works, and drives. SHe is ind in her room. SHe  has had outpt PT, no hx of hhc, STR or MH/D/A tx hx. SHe uses [employer]. No needs. Sposue to transport home.

## 2024-04-16 NOTE — UTILIZATION REVIEW
NOTIFICATION OF ADMISSION DISCHARGE   This is a Notification of Discharge from [facility]. Please be advised that this patient has been discharge from our facility. Below you will find the admission and discharge date and time including the patient’s disposition.   UTILIZATION REVIEW CONTACT:  Romina Post  Utilization   Network Utilization Review Department  Phone: [phone] carefully listen to the prompts. All voicemails are confidential.  Email: [email or web address]     ADMISSION INFORMATION  PRESENTATION DATE: 4/13/2024  1:07 PM  OBERVATION ADMISSION DATE:   INPATIENT ADMISSION DATE: 4/13/24  5:15 PM   DISCHARGE DATE: 4/15/2024  2:01 PM   DISPOSITION:Home/Self Care    Network Utilization Review Department  ATTENTION: Please call with any questions or concerns to [phone] and carefully listen to the prompts so that you are directed to the right person. All voicemails are confidential.   For Discharge needs, contact Care Management DC Support Team at [phone] opt. [phone]  Send all requests for admission clinical reviews, approved or denied determinations and any other requests to dedicated fax number below belonging to the campus where the patient is receiving treatment. List of dedicated fax numbers for the Facilities:  FACILITY NAME UR FAX NUMBER   ADMISSION DENIALS (Administrative/Medical Necessity) [phone]   DISCHARGE SUPPORT TEAM ([facility]) [phone]   PARENT CHILD HEALTH (Maternity/NICU/Pediatrics) [phone]   [facility] [phone]   [facility] [phone]   [facility] [phone]   [facility] [phone]   [facility] [phone]   [facility] [phone]   [facility] [phone]   [facility] [phone]    [facility] [phone]   [facility] [phone]   [facility] [phone]   [facility] [phone]

## 2024-04-19 LAB
BACTERIA BLD CULT: NORMAL
BACTERIA BLD CULT: NORMAL

## 2024-04-22 ENCOUNTER — PATIENT MESSAGE (OUTPATIENT)
Dept: GASTROENTEROLOGY | Facility: CLINIC | Age: 69
End: 2024-04-22

## 2024-04-22 ENCOUNTER — TELEPHONE (OUTPATIENT)
Dept: GASTROENTEROLOGY | Facility: CLINIC | Age: 69
End: 2024-04-22

## 2024-04-24 DIAGNOSIS — Z00.6 ENCOUNTER FOR EXAMINATION FOR NORMAL COMPARISON OR CONTROL IN CLINICAL RESEARCH PROGRAM: ICD-10-CM

## 2024-04-30 LAB
BASOPHILS # BLD AUTO: 0 X10E3/UL (ref 0–0.2)
BASOPHILS NFR BLD AUTO: 1 %
EOSINOPHIL # BLD AUTO: 0.2 X10E3/UL (ref 0–0.4)
EOSINOPHIL NFR BLD AUTO: 3 %
ERYTHROCYTE [DISTWIDTH] IN BLOOD BY AUTOMATED COUNT: 12.4 % (ref 11.7–15.4)
HCT VFR BLD AUTO: 31.7 % (ref 34–46.6)
HGB BLD-MCNC: 11.9 G/DL (ref 11.1–15.9)
IMM GRANULOCYTES # BLD: 0.1 X10E3/UL (ref 0–0.1)
IMM GRANULOCYTES NFR BLD: 2 %
LAB DIRECTOR NAME PROVIDER: NORMAL
LAB DIRECTOR NAME PROVIDER: NORMAL
LYMPHOCYTES # BLD AUTO: 2.3 X10E3/UL (ref 0.7–3.1)
LYMPHOCYTES NFR BLD AUTO: 34 %
Lab: NORMAL
Lab: NORMAL
MCH RBC QN AUTO: 35.2 PG (ref 26.6–33)
MCHC RBC AUTO-ENTMCNC: 37.5 G/DL (ref 31.5–35.7)
MCV RBC AUTO: 94 FL (ref 79–97)
MONOCYTES # BLD AUTO: 0.5 X10E3/UL (ref 0.1–0.9)
MONOCYTES NFR BLD AUTO: 8 %
MORPHOLOGY BLD-IMP: ABNORMAL
NEUTROPHILS # BLD AUTO: 3.6 X10E3/UL (ref 1.4–7)
NEUTROPHILS NFR BLD AUTO: 52 %
PLATELET # BLD AUTO: 191 X10E3/UL (ref 150–450)
RBC # BLD AUTO: 3.38 X10E6/UL (ref 3.77–5.28)
REF LAB TEST METHOD: NORMAL
REF LAB TEST METHOD: NORMAL
TCRB GENE REAR BLD/T QL: NORMAL
TCRG GENE REAR BLD/T QL: NORMAL
WBC # BLD AUTO: 6.7 X10E3/UL (ref 3.4–10.8)

## 2024-05-05 LAB
ALBUMIN SERPL-MCNC: 3.9 G/DL (ref 3.9–4.9)
ALBUMIN/GLOB SERPL: 1.6 {RATIO} (ref 1.2–2.2)
ALP SERPL-CCNC: 90 IU/L (ref 44–121)
ALT SERPL-CCNC: 53 IU/L (ref 0–32)
AST SERPL-CCNC: 30 IU/L (ref 0–40)
BASOPHILS # BLD AUTO: 0.1 X10E3/UL (ref 0–0.2)
BASOPHILS NFR BLD AUTO: 1 %
BILIRUB SERPL-MCNC: 0.5 MG/DL (ref 0–1.2)
BUN SERPL-MCNC: 27 MG/DL (ref 8–27)
BUN/CREAT SERPL: 29 (ref 12–28)
CALCIUM SERPL-MCNC: 8.8 MG/DL (ref 8.7–10.3)
CHLORIDE SERPL-SCNC: 104 MMOL/L (ref 96–106)
CO2 SERPL-SCNC: 27 MMOL/L (ref 20–29)
CREAT SERPL-MCNC: 0.92 MG/DL (ref 0.57–1)
EGFR: 68 ML/MIN/1.73
EOSINOPHIL # BLD AUTO: 0.2 X10E3/UL (ref 0–0.4)
EOSINOPHIL NFR BLD AUTO: 3 %
ERYTHROCYTE [DISTWIDTH] IN BLOOD BY AUTOMATED COUNT: 13.1 % (ref 11.7–15.4)
GLOBULIN SER-MCNC: 2.4 G/DL (ref 1.5–4.5)
GLUCOSE SERPL-MCNC: 85 MG/DL (ref 70–99)
HCT VFR BLD AUTO: 31.9 % (ref 34–46.6)
HGB BLD-MCNC: 11.7 G/DL (ref 11.1–15.9)
IMM GRANULOCYTES # BLD: 0.1 X10E3/UL (ref 0–0.1)
IMM GRANULOCYTES NFR BLD: 1 %
INR PPP: 1 (ref 0.9–1.2)
LYMPHOCYTES # BLD AUTO: 2.8 X10E3/UL (ref 0.7–3.1)
LYMPHOCYTES NFR BLD AUTO: 41 %
MCH RBC QN AUTO: 34.6 PG (ref 26.6–33)
MCHC RBC AUTO-ENTMCNC: 36.7 G/DL (ref 31.5–35.7)
MCV RBC AUTO: 94 FL (ref 79–97)
MONOCYTES # BLD AUTO: 0.6 X10E3/UL (ref 0.1–0.9)
MONOCYTES NFR BLD AUTO: 9 %
MORPHOLOGY BLD-IMP: ABNORMAL
NEUTROPHILS # BLD AUTO: 3.1 X10E3/UL (ref 1.4–7)
NEUTROPHILS NFR BLD AUTO: 45 %
PLATELET # BLD AUTO: 109 X10E3/UL (ref 150–450)
POTASSIUM SERPL-SCNC: 4.2 MMOL/L (ref 3.5–5.2)
PROT SERPL-MCNC: 6.3 G/DL (ref 6–8.5)
PROTHROMBIN TIME: 10.4 SEC (ref 9.1–12)
RBC # BLD AUTO: 3.38 X10E6/UL (ref 3.77–5.28)
SODIUM SERPL-SCNC: 142 MMOL/L (ref 134–144)
WBC # BLD AUTO: 6.8 X10E3/UL (ref 3.4–10.8)

## 2024-05-07 ENCOUNTER — TELEMEDICINE (OUTPATIENT)
Dept: GASTROENTEROLOGY | Facility: CLINIC | Age: 69
End: 2024-05-07
Payer: COMMERCIAL

## 2024-05-07 VITALS — WEIGHT: 182 LBS | HEIGHT: 65 IN | BODY MASS INDEX: 30.32 KG/M2

## 2024-05-07 DIAGNOSIS — K75.4 AUTOIMMUNE HEPATITIS (HCC): Primary | ICD-10-CM

## 2024-05-07 PROCEDURE — 99214 OFFICE O/P EST MOD 30 MIN: CPT | Performed by: INTERNAL MEDICINE

## 2024-05-07 RX ORDER — AZATHIOPRINE 50 MG/1
100 TABLET ORAL DAILY
Qty: 180 TABLET | Refills: 3 | Status: SHIPPED | OUTPATIENT
Start: 2024-05-07

## 2024-05-07 NOTE — PROGRESS NOTES
Virtual Regular Visit    Verification of patient location:    Patient is located at Other in the following state in which I hold an active license [place]    Assessment/Plan:    Ms. Estella Stein is a 69-year-old female with a history of chronic liver lesions secondary to AIH/PBC overlap syndrome, hypothyroidism, Sjogren's disease, and hypertension who presents for virtual visit for hepatology follow-up.    # AIH/PBC Overlap Syndrome: Patient with a longstanding history of chronically elevated liver enzymes.  This was also complicated by positive serologies including LYNSEY and AMA.  Negative ASMA.  Patient's status post liver biopsy 5/2023 which did reveal patchy portal and interface hepatitis with periportal fibrosis.  She is therefore diagnosed with AIH/PBC overlap syndrome given positive serologies and biopsy findings.  In December, patient had been started on prednisone along with ursodiol.  Since initiation of these medications, patient has had significant symptom improvement.  LFTs have also started to normalize.  Patient did obtain outpatient blood work which revealed an AST/ALT of 52/29, alkaline phosphatase of 90, total bilirubin 0.4.  Symptoms today improved and patient currently without radiologic or histological evidence of cirrhosis and liver function appears to be intact at this time.  TPMT level checked in 29.  At this time, would recommend initiation of immunosuppression with Imuran for treatment of AIH.  Will plan to continue on prednisone with long-term taper.  Will continue checking liver function every 2 weeks and hold on tapering until LFTs have completely normalized.  Will continue to require close hepatology follow-up.    Plan:  Begin on Imuran 100 mg daily; script sent to pharmacy   Continue on Prednisone 20 mg daily; plan to tape to 10 mg once LFTs normalize  Continue on Ursodiol 300 mg twice daily   Continue monitoring of CMP q2 weeks to evaluate LFTs  Will check Vitamins ADEK to evaluate for  deficiency; patient UTD on DEXA scan imaging  Recommend completion of Hepatitis A and B Vaccinations  Schedule a follow up visit in 3 months for continued follow up     Problem List Items Addressed This Visit          Digestive    Autoimmune hepatitis (HCC) - Primary    Relevant Medications    azaTHIOprine (IMURAN) 50 mg tablet    Other Relevant Orders    Vitamin A    Vitamin D 1,25 dihydroxy    Vitamin E    Vitamin K            Reason for visit is   Chief Complaint   Patient presents with    Follow-up     Auto hep    Virtual Regular Visit          Encounter provider Macario Prabhakar MD      Recent Visits  No visits were found meeting these conditions.  Showing recent visits within past 7 days and meeting all other requirements  Today's Visits  Date Type Provider Dept   05/07/24 Telemedicine Macario Prabhakar MD [facility]   Showing today's visits and meeting all other requirements  Future Appointments  No visits were found meeting these conditions.  Showing future appointments within next 150 days and meeting all other requirements       The patient was identified by name and date of birth. Estella Stein was informed that this is a telemedicine visit and that the visit is being conducted through the Epic Embedded platform. She agrees to proceed..  My office door was closed. No one else was in the room.  She acknowledged consent and understanding of privacy and security of the video platform. The patient has agreed to participate and understands they can discontinue the visit at any time.    Patient is aware this is a billable service.     Subjective  Estella Stein is a 69 y.o. female with a past medical history of chronically elevated liver enzymes secondary to AIH/PBC overlap, hypothyroidism, Sjogren's disease, and hypertension who presents for hepatology follow-up.  Patient was last seen and evaluated 12/2023.  At that time, patient was started on prednisone and ursodiol for treatment of  AIH/PBC overlap syndrome.  Following initiation of these medications, patient has had improvement in clinical symptoms along with liver chemistries.  She remains compliant with current medication regimen and laboratory follow-up.    Since her last visit, patient was hospitalized from  to 4/15 due to pneumonia requiring antibiotic therapy.  Since her discharge, patient states that she is feeling well.  Today she is asymptomatic with no complaints.  Denies significant fatigue, arthralgias, pruritus, jaundice, or episodes of confusion.  No complaints during today's visit.    HPI     Past Medical History:   Diagnosis Date    Anemia     I stopped taking iron supplements after I went through menop    Disease of thyroid gland     Hypothyroid     Sjogren's disease (HCC)        Past Surgical History:   Procedure Laterality Date    CARPAL TUNNEL RELEASE       SECTION      COLONOSCOPY      HYSTERECTOMY       approx    IR BIOPSY LIVER RANDOM  2023    LIVER BIOPSY  2017?    OOPHORECTOMY Bilateral     approx     SKIN BIOPSY      TUBAL LIGATION         Current Outpatient Medications   Medication Sig Dispense Refill    acetaminophen (TYLENOL) 325 mg tablet Take 2 tablets (650 mg total) by mouth every 6 (six) hours as needed for fever 15 tablet 0    albuterol (PROVENTIL HFA,VENTOLIN HFA) 90 mcg/act inhaler Inhale 2 puffs every 6 (six) hours as needed      amLODIPine (NORVASC) 5 mg tablet Take 5 mg by mouth daily      atorvastatin (LIPITOR) 40 mg tablet Take 1 tablet (40 mg total) by mouth daily with dinner 30 tablet 0    azaTHIOprine (IMURAN) 50 mg tablet Take 2 tablets (100 mg total) by mouth daily 180 tablet 3    Calcium Carbonate-Vit D-Min (Calcium 600+D Plus Minerals) 600-400 MG-UNIT TABS Take 1 tablet by mouth 2 (two) times a day      citalopram (CeleXA) 20 mg tablet Take 20 mg by mouth daily      fexofenadine (ALLEGRA) 180 MG tablet Take 180 mg by mouth if needed      Multiple Vitamin  "(multivitamin) tablet Take 1 tablet by mouth daily      NON FORMULARY Garlacin      Omega-3 Fatty Acids (OMEGA-3 FISH OIL PO) Take by mouth      omeprazole (PriLOSEC) 20 mg delayed release capsule Take 1 capsule (20 mg total) by mouth daily 30 capsule 2    predniSONE 20 mg tablet Take 1 tablet (20 mg total) by mouth daily 30 tablet 3    PreviDent 5000 Dry Mouth 1.1 % GEL       Sodium Fluoride 5000 PPM 1.1 % PSTE USE 1-2 TIMES AS INSTRUCTED      Synthroid 75 MCG tablet TAKE 1 TABLET BY MOUTH EVERY DAY IN THE MORNING ON AN EMPTY STOMACH FOR 30 DAYS      TURMERIC PO Take by mouth      ursodiol (ACTIGALL) 300 mg capsule TAKE 1 CAPSULE BY MOUTH THREE TIMES A  capsule 1    valsartan (DIOVAN) 320 MG tablet Take 320 mg by mouth daily      ibuprofen (MOTRIN) 200 mg tablet Take 400 mg by mouth every 6 (six) hours as needed for mild pain (Patient not taking: Reported on 5/7/2024)       No current facility-administered medications for this visit.        Allergies   Allergen Reactions    Other Tongue Swelling     Eggplant - dysphagia    Shellfish-Derived Products - Food Allergy Swelling       Review of Systems   Constitutional:  Negative for activity change, chills, fatigue and fever.   Eyes:  Negative for visual disturbance.   Respiratory:  Negative for shortness of breath and wheezing.    Gastrointestinal:  Negative for constipation, diarrhea, nausea and vomiting.   Musculoskeletal:  Negative for back pain and gait problem.   Neurological:  Negative for dizziness, weakness and light-headedness.       Video Exam    Vitals:    05/07/24 0904   Weight: 82.6 kg (182 lb)   Height: 5' 5\" (1.651 m)       Physical Exam  Constitutional:       General: She is not in acute distress.     Appearance: She is not ill-appearing or toxic-appearing.   HENT:      Head: Normocephalic.      Nose: Nose normal.   Eyes:      General: No scleral icterus.  Pulmonary:      Effort: Pulmonary effort is normal.   Skin:     Coloration: Skin is not " jaundiced or pale.   Neurological:      Mental Status: She is alert and oriented to person, place, and time.   Psychiatric:         Mood and Affect: Mood normal.         Behavior: Behavior normal.          Visit Time  Total Visit Duration: 30 minutes

## 2024-05-08 ENCOUNTER — OFFICE VISIT (OUTPATIENT)
Age: 69
End: 2024-05-08
Payer: COMMERCIAL

## 2024-05-08 VITALS
HEIGHT: 65 IN | WEIGHT: 187.4 LBS | HEART RATE: 70 BPM | BODY MASS INDEX: 31.22 KG/M2 | OXYGEN SATURATION: 97 % | DIASTOLIC BLOOD PRESSURE: 70 MMHG | SYSTOLIC BLOOD PRESSURE: 122 MMHG

## 2024-05-08 DIAGNOSIS — K75.4 AUTOIMMUNE HEPATITIS (HCC): ICD-10-CM

## 2024-05-08 DIAGNOSIS — D84.9 IMMUNOCOMPROMISED (HCC): ICD-10-CM

## 2024-05-08 DIAGNOSIS — J18.9 PNEUMONIA DUE TO INFECTIOUS ORGANISM, UNSPECIFIED LATERALITY, UNSPECIFIED PART OF LUNG: ICD-10-CM

## 2024-05-08 DIAGNOSIS — R91.1 LUNG NODULE: ICD-10-CM

## 2024-05-08 DIAGNOSIS — D69.6 THROMBOCYTOPENIA (HCC): ICD-10-CM

## 2024-05-08 DIAGNOSIS — M35.00 SJOGREN'S SYNDROME, WITH UNSPECIFIED ORGAN INVOLVEMENT (HCC): ICD-10-CM

## 2024-05-08 DIAGNOSIS — R93.89 ABNORMAL CHEST CT: Primary | ICD-10-CM

## 2024-05-08 PROCEDURE — 99214 OFFICE O/P EST MOD 30 MIN: CPT | Performed by: PHYSICIAN ASSISTANT

## 2024-05-08 NOTE — PROGRESS NOTES
Assessment:    1. Abnormal chest CT  CT chest without contrast      2. Lung nodule  CT chest without contrast      3. Pneumonia due to infectious organism, unspecified laterality, unspecified part of lung  CT chest without contrast      4. Autoimmune hepatitis (HCC)      on prednisone 20 mg daily      5. Immunocompromised (HCC)        6. Sjogren's syndrome, with unspecified organ involvement (HCC)        7. Thrombocytopenia (HCC)              Plan:     Patient presenting for hospital follow-up, seen by Dr. Louis last month for pneumonia and abnormal CT scan  We reviewed CT findings in detail, could all be infectious vs inflammatory however there are abnormalities mentioned on another scan she had done at an outside facility in 2023.  I discussed with her that some changes could be related to her autoimmune disease however it is important for us to follow-up with additional imaging to exclude malignancy.  She understands if there is any enlargement of lung nodules on the follow-up scan that she will need a PET scan versus biopsy  At this time, she does not have any infectious or respiratory symptoms.  Based on history, I suspect that her pneumonia was likely due to aspiration  She remains on prednisone 20 mg.  Just recently seen by her gastroenterologist and is going to be starting on Imuran.  We discussed potential side effects of that.  She will keep close communication with them as well as her rheumatologist  Her rheumatologist is Dr. Beronica Delaney of [facility]  Received her pneumonia vaccine and will be getting hepatitis vaccines with her PCP    Subjective:     Patient ID: Estella Stein is a 69 y.o. female.    Chief Complaint:    Estella Argueta is a very pleasant 69-year-old female with past medical history including several autoimmune disease including Sjogren's, autoimmune hepatitis, Hashimoto's thyroiditis, thrombocytopenia, chronic immunosuppression presenting for hospital  follow-up.    Patient was admitted with pneumonia.  2 days prior had malaise, fevers, chills, and right-sided chest pain.  There was also nausea and forceful vomiting prior to admission.  She was admitted due to troponin elevation despite her stable respiratory status.  She was seen by cardiology and respiratory during her admission.  She was treated with ceftriaxone and azithromycin and discharged with Ceftin to complete total course for her pneumonia.  She will be following up with cardiology in a few months.  She says that she has been doing well since being out of the hospital.  She does not have any shortness of breath or cough, nor did she when she was acutely ill.  She is not having any chest pain or chills.  She notes that she had a similar illness last summer when she was out of state visiting her daughter.  That episode was also preceded by nausea and vomiting.  Of note, she was not on prednisone at that time.    She has chronic fatigue and headaches. Chronic dry mouth due to Sjogren's. Feels to be in her normal state of health.    She has a history of mild allergies which typically do not bother her.    Minor smoking history from  through college, quit in her 20s.    No personal history of malignancy.      The following portions of the patient's history were reviewed in this encounter and updated as appropriate: [unfilled]  Review of Systems   HENT:  Negative for congestion.         Dry mouth from Sjogren's   Respiratory:  Negative for cough and shortness of breath.    Cardiovascular:  Negative for chest pain.   Gastrointestinal:  Negative for abdominal pain, nausea and vomiting.   Neurological:  Positive for headaches.   Psychiatric/Behavioral:  Positive for agitation.    All other systems reviewed and are negative.        Objective:    Physical Exam  Vitals reviewed.   Constitutional:       General: She is not in acute distress.     Appearance: She is not toxic-appearing.   HENT:      Head:  Normocephalic and atraumatic.   Eyes:      General: No scleral icterus.  Cardiovascular:      Rate and Rhythm: Normal rate and regular rhythm.   Pulmonary:      Effort: Pulmonary effort is normal.      Breath sounds: Normal breath sounds.   Musculoskeletal:         General: No signs of injury.   Skin:     General: Skin is warm and dry.   Neurological:      General: No focal deficit present.      Mental Status: She is alert. Mental status is at baseline.   Psychiatric:         Mood and Affect: Mood normal.         Behavior: Behavior normal.         Lab Review:   Orders Only on 05/03/2024   Component Date Value    White Blood Cell Count 05/03/2024 6.8     Red Blood Cell Count 05/03/2024 3.38 (L)     Hemoglobin 05/03/2024 11.7     HCT 05/03/2024 31.9 (L)     MCV 05/03/2024 94     MCH 05/03/2024 34.6 (H)     MCHC 05/03/2024 36.7 (H)     RDW 05/03/2024 13.1     Platelet Count 05/03/2024 109 (L)     Neutrophils 05/03/2024 45     Lymphocytes 05/03/2024 41     Monocytes 05/03/2024 9     Eosinophils 05/03/2024 3     Basophils PCT 05/03/2024 1     Neutrophils (Absolute) 05/03/2024 3.1     Lymphocytes (Absolute) 05/03/2024 2.8     Monocytes (Absolute) 05/03/2024 0.6     Eosinophils (Absolute) 05/03/2024 0.2     Basophils ABS 05/03/2024 0.1     Immature Granulocytes 05/03/2024 1     Immature Granulocytes (A* 05/03/2024 0.1     Hematology Comments 05/03/2024 Note:     Glucose, Random 05/03/2024 85     BUN 05/03/2024 27     Creatinine 05/03/2024 0.92     eGFR 05/03/2024 68     SL AMB BUN/CREATININE RA* 05/03/2024 29 (H)     Sodium 05/03/2024 142     Potassium 05/03/2024 4.2     Chloride 05/03/2024 104     CO2 05/03/2024 27     CALCIUM 05/03/2024 8.8     Protein, Total 05/03/2024 6.3     Albumin 05/03/2024 3.9     Globulin, Total 05/03/2024 2.4     Albumin/Globulin Ratio 05/03/2024 1.6     TOTAL BILIRUBIN 05/03/2024 0.5     Alk Phos Isoenzymes 05/03/2024 90     AST 05/03/2024 30     ALT 05/03/2024 53 (H)     INR 05/03/2024 1.0      Prothrombin Time 05/03/2024 10.4    Orders Only on 04/18/2024   Component Date Value    T-Cell PCR Interpretation 04/18/2024 Comment     DIRECTOR REVIEW: 04/18/2024 Comment     METHODOLOGY 04/18/2024 Comment     REFERENCES: 04/18/2024 Comment     TCRB GENE CLONALITY RESU* 04/18/2024 Comment     DIRECTOR REVIEW: 04/18/2024 Comment     METHODOLOGY 04/18/2024 Comment     REFERENCES: 04/18/2024 Comment     White Blood Cell Count 04/18/2024 6.7     Red Blood Cell Count 04/18/2024 3.38 (L)     Hemoglobin 04/18/2024 11.9     HCT 04/18/2024 31.7 (L)     MCV 04/18/2024 94     MCH 04/18/2024 35.2 (H)     MCHC 04/18/2024 37.5 (H)     RDW 04/18/2024 12.4     Platelet Count 04/18/2024 191     Neutrophils 04/18/2024 52     Lymphocytes 04/18/2024 34     Monocytes 04/18/2024 8     Eosinophils 04/18/2024 3     Basophils PCT 04/18/2024 1     Neutrophils (Absolute) 04/18/2024 3.6     Lymphocytes (Absolute) 04/18/2024 2.3     Monocytes (Absolute) 04/18/2024 0.5     Eosinophils (Absolute) 04/18/2024 0.2     Basophils ABS 04/18/2024 0.0     Immature Granulocytes 04/18/2024 2     Immature Granulocytes (A* 04/18/2024 0.1     Hematology Comments 04/18/2024 Note:    No results displayed because visit has over 200 results.      Ancillary Orders on 04/12/2024   Component Date Value    Glucose, Random 04/18/2024 82     BUN 04/18/2024 17     Creatinine 04/18/2024 0.96     eGFR 04/18/2024 65     SL AMB BUN/CREATININE RA* 04/18/2024 18     Sodium 04/18/2024 142     Potassium 04/18/2024 4.1     Chloride 04/18/2024 103     CO2 04/18/2024 25     CALCIUM 04/18/2024 9.0     Protein, Total 04/18/2024 6.3     Albumin 04/18/2024 3.7 (L)     Globulin, Total 04/18/2024 2.6     Albumin/Globulin Ratio 04/18/2024 1.4     TOTAL BILIRUBIN 04/18/2024 0.4     Alk Phos Isoenzymes 04/18/2024 130 (H)     AST 04/18/2024 31     ALT 04/18/2024 85 (H)    Ancillary Orders on 04/05/2024   Component Date Value    White Blood Cell Count 04/18/2024 7.0     Red Blood Cell  Count 04/18/2024 3.41 (L)     Hemoglobin 04/18/2024 12.0     HCT 04/18/2024 32.7 (L)     MCV 04/18/2024 96     MCH 04/18/2024 35.2 (H)     MCHC 04/18/2024 36.7 (H)     RDW 04/18/2024 12.6     Platelet Count 04/18/2024 186     Neutrophils 04/18/2024 59     Lymphocytes 04/18/2024 34     Monocytes 04/18/2024 5     Eosinophils 04/18/2024 0     Basophils PCT 04/18/2024 2     Neutrophils (Absolute) 04/18/2024 4.1     Lymphocytes (Absolute) 04/18/2024 2.4     Monocytes (Absolute) 04/18/2024 0.4     Eosinophils (Absolute) 04/18/2024 0.0     Basophils ABS 04/18/2024 0.1     Hematology Comments 04/18/2024 Note:     INR 04/18/2024 0.9     Prothrombin Time 04/18/2024 10.0    Orders Only on 04/03/2024   Component Date Value    White Blood Cell Count 04/03/2024 6.1     Red Blood Cell Count 04/03/2024 3.47 (L)     Hemoglobin 04/03/2024 11.9     HCT 04/03/2024 32.4 (L)     MCV 04/03/2024 93     MCH 04/03/2024 34.3 (H)     MCHC 04/03/2024 36.7 (H)     RDW 04/03/2024 12.4     Platelet Count 04/03/2024 119 (L)     Neutrophils 04/03/2024 50     Lymphocytes 04/03/2024 39     Monocytes 04/03/2024 7     Eosinophils 04/03/2024 2     Basophils PCT 04/03/2024 1     Neutrophils (Absolute) 04/03/2024 3.1     Lymphocytes (Absolute) 04/03/2024 2.4     Monocytes (Absolute) 04/03/2024 0.5     Eosinophils (Absolute) 04/03/2024 0.1     Basophils ABS 04/03/2024 0.0     Immature Granulocytes 04/03/2024 1     Immature Granulocytes (A* 04/03/2024 0.0     Glucose, Random 04/03/2024 88     BUN 04/03/2024 25     Creatinine 04/03/2024 0.91     eGFR 04/03/2024 69     SL AMB BUN/CREATININE RA* 04/03/2024 27     Sodium 04/03/2024 142     Potassium 04/03/2024 4.0     Chloride 04/03/2024 105     CO2 04/03/2024 22     CALCIUM 04/03/2024 8.9     Protein, Total 04/03/2024 6.3     Albumin 04/03/2024 3.9     Globulin, Total 04/03/2024 2.4     Albumin/Globulin Ratio 04/03/2024 1.6     TOTAL BILIRUBIN 04/03/2024 0.5     Alk Phos Isoenzymes 04/03/2024 88     AST  04/03/2024 29     ALT 04/03/2024 43 (H)     TPMT ACTIVITY 04/03/2024 30.4     Interpretation 04/03/2024 Comment     METHODOLOGY 04/03/2024 Comment     TSH (ICMA) 04/03/2024 19 (H)     Free T4 by Dialysis/Mass* 04/03/2024 1.2     INR 04/03/2024 1.0     Prothrombin Time 04/03/2024 10.3    Orders Only on 03/21/2024   Component Date Value    White Blood Cell Count 03/21/2024 6.0     Red Blood Cell Count 03/21/2024 3.57 (L)     Hemoglobin 03/21/2024 12.3     HCT 03/21/2024 32.7 (L)     MCV 03/21/2024 92     MCH 03/21/2024 34.5 (H)     MCHC 03/21/2024 37.6 (H)     RDW 03/21/2024 12.5     Platelet Count 03/21/2024 97 (LL)     Neutrophils 03/21/2024 44     Lymphocytes 03/21/2024 44     Monocytes 03/21/2024 7     Eosinophils 03/21/2024 3     Basophils PCT 03/21/2024 1     Neutrophils (Absolute) 03/21/2024 2.7     Lymphocytes (Absolute) 03/21/2024 2.6     Monocytes (Absolute) 03/21/2024 0.4     Eosinophils (Absolute) 03/21/2024 0.2     Basophils ABS 03/21/2024 0.0     Immature Granulocytes 03/21/2024 1     Immature Granulocytes (A* 03/21/2024 0.0     Hematology Comments 03/21/2024 Note:     Glucose, Random 03/21/2024 90     BUN 03/21/2024 21     Creatinine 03/21/2024 0.90     eGFR 03/21/2024 70     SL AMB BUN/CREATININE RA* 03/21/2024 23     Sodium 03/21/2024 139     Potassium 03/21/2024 4.0     Chloride 03/21/2024 104     CO2 03/21/2024 25     CALCIUM 03/21/2024 8.9     Protein, Total 03/21/2024 6.4     Albumin 03/21/2024 3.8 (L)     Globulin, Total 03/21/2024 2.6     Albumin/Globulin Ratio 03/21/2024 1.5     TOTAL BILIRUBIN 03/21/2024 0.3     Alk Phos Isoenzymes 03/21/2024 115     AST 03/21/2024 48 (H)     ALT 03/21/2024 99 (H)     TPMT ACTIVITY 03/21/2024 30.0     Interpretation 03/21/2024 Comment     METHODOLOGY 03/21/2024 Comment     TSH (ICMA) 03/21/2024 16 (H)     Free T4 by Dialysis/Mass* 03/21/2024 0.86     INR 03/21/2024 1.0     Prothrombin Time 03/21/2024 10.3    Ancillary Orders on 03/15/2024   Component Date  Value    White Blood Cell Count 03/21/2024 6.1     Red Blood Cell Count 03/21/2024 3.52 (L)     Hemoglobin 03/21/2024 12.0     HCT 03/21/2024 32.6 (L)     MCV 03/21/2024 93     MCH 03/21/2024 34.1 (H)     MCHC 03/21/2024 36.8 (H)     RDW 03/21/2024 12.9     Platelet Count 03/21/2024 94 (LL)     Neutrophils 03/21/2024 45     Lymphocytes 03/21/2024 44     Monocytes 03/21/2024 7     Eosinophils 03/21/2024 3     Basophils PCT 03/21/2024 0     Neutrophils (Absolute) 03/21/2024 2.7     Lymphocytes (Absolute) 03/21/2024 2.7     Monocytes (Absolute) 03/21/2024 0.4     Eosinophils (Absolute) 03/21/2024 0.2     Basophils ABS 03/21/2024 0.0     Immature Granulocytes 03/21/2024 1     Immature Granulocytes (A* 03/21/2024 0.0     Hematology Comments 03/21/2024 Note:

## 2024-05-15 PROBLEM — J18.9 PNEUMONIA OF RIGHT MIDDLE LOBE DUE TO INFECTIOUS ORGANISM: Status: RESOLVED | Noted: 2024-04-13 | Resolved: 2024-05-15

## 2024-05-18 LAB
1,25(OH)2D3 SERPL-MCNC: 57.1 PG/ML (ref 24.8–81.5)
A-TOCOPHEROL VIT E SERPL-MCNC: 14.8 MG/L (ref 9–29)
GAMMA TOCOPHEROL SERPL-MCNC: 0.3 MG/L (ref 0.5–4.9)
PHYTONADIONE SERPL-MCNC: 0.64 NG/ML (ref 0.1–2.2)
VIT A SERPL-MCNC: 59.7 UG/DL (ref 22–69.5)

## 2024-05-21 LAB
ALBUMIN SERPL-MCNC: 4 G/DL (ref 3.9–4.9)
ALBUMIN/GLOB SERPL: 2.2 {RATIO} (ref 1.2–2.2)
ALP SERPL-CCNC: 84 IU/L (ref 44–121)
ALT SERPL-CCNC: 45 IU/L (ref 0–32)
AST SERPL-CCNC: 29 IU/L (ref 0–40)
BASOPHILS # BLD AUTO: 0 X10E3/UL (ref 0–0.2)
BASOPHILS NFR BLD AUTO: 1 %
BILIRUB SERPL-MCNC: 0.6 MG/DL (ref 0–1.2)
BUN SERPL-MCNC: 23 MG/DL (ref 8–27)
BUN/CREAT SERPL: 27 (ref 12–28)
CALCIUM SERPL-MCNC: 8.7 MG/DL (ref 8.7–10.3)
CHLORIDE SERPL-SCNC: 106 MMOL/L (ref 96–106)
CO2 SERPL-SCNC: 26 MMOL/L (ref 20–29)
CREAT SERPL-MCNC: 0.86 MG/DL (ref 0.57–1)
EGFR: 74 ML/MIN/1.73
EOSINOPHIL # BLD AUTO: 0.2 X10E3/UL (ref 0–0.4)
EOSINOPHIL NFR BLD AUTO: 3 %
ERYTHROCYTE [DISTWIDTH] IN BLOOD BY AUTOMATED COUNT: 12.9 % (ref 11.7–15.4)
GLOBULIN SER-MCNC: 1.8 G/DL (ref 1.5–4.5)
GLUCOSE SERPL-MCNC: 80 MG/DL (ref 70–99)
HCT VFR BLD AUTO: 32.1 % (ref 34–46.6)
HGB BLD-MCNC: 11.9 G/DL (ref 11.1–15.9)
IMM GRANULOCYTES # BLD: 0.1 X10E3/UL (ref 0–0.1)
IMM GRANULOCYTES NFR BLD: 1 %
INR PPP: 1 (ref 0.9–1.2)
LYMPHOCYTES # BLD AUTO: 2.7 X10E3/UL (ref 0.7–3.1)
LYMPHOCYTES NFR BLD AUTO: 33 %
MCH RBC QN AUTO: 33.9 PG (ref 26.6–33)
MCHC RBC AUTO-ENTMCNC: 37.1 G/DL (ref 31.5–35.7)
MCV RBC AUTO: 92 FL (ref 79–97)
MONOCYTES # BLD AUTO: 0.6 X10E3/UL (ref 0.1–0.9)
MONOCYTES NFR BLD AUTO: 8 %
MORPHOLOGY BLD-IMP: ABNORMAL
NEUTROPHILS # BLD AUTO: 4.5 X10E3/UL (ref 1.4–7)
NEUTROPHILS NFR BLD AUTO: 54 %
PLATELET # BLD AUTO: 129 X10E3/UL (ref 150–450)
POTASSIUM SERPL-SCNC: 4.1 MMOL/L (ref 3.5–5.2)
PROT SERPL-MCNC: 5.8 G/DL (ref 6–8.5)
PROTHROMBIN TIME: 10.3 SEC (ref 9.1–12)
RBC # BLD AUTO: 3.51 X10E6/UL (ref 3.77–5.28)
REF LAB TEST METHOD: NORMAL
SODIUM SERPL-SCNC: 142 MMOL/L (ref 134–144)
T4 FREE SERPL DIALY-MCNC: 1.2 NG/DL
TEST INTERPRETATION: NORMAL
TPMT RBC-CCNC: 25.3 UNITS/ML RBC
TSH SERPL-ACNC: 24 UU/ML
WBC # BLD AUTO: 8.1 X10E3/UL (ref 3.4–10.8)

## 2024-05-24 ENCOUNTER — APPOINTMENT (OUTPATIENT)
Dept: LAB | Facility: CLINIC | Age: 69
End: 2024-05-24

## 2024-05-24 DIAGNOSIS — Z00.6 ENCOUNTER FOR EXAMINATION FOR NORMAL COMPARISON OR CONTROL IN CLINICAL RESEARCH PROGRAM: ICD-10-CM

## 2024-05-24 PROCEDURE — 36415 COLL VENOUS BLD VENIPUNCTURE: CPT

## 2024-05-28 ENCOUNTER — HOSPITAL ENCOUNTER (OUTPATIENT)
Dept: CT IMAGING | Facility: HOSPITAL | Age: 69
Discharge: HOME | End: 2024-05-28
Payer: COMMERCIAL

## 2024-05-28 DIAGNOSIS — R93.89 ABNORMAL CHEST CT: ICD-10-CM

## 2024-05-28 DIAGNOSIS — J18.9 PNEUMONIA DUE TO INFECTIOUS ORGANISM, UNSPECIFIED LATERALITY, UNSPECIFIED PART OF LUNG: ICD-10-CM

## 2024-05-28 DIAGNOSIS — R91.1 LUNG NODULE: ICD-10-CM

## 2024-05-28 LAB
ALBUMIN SERPL-MCNC: 4.1 G/DL (ref 3.9–4.9)
ALBUMIN/GLOB SERPL: 2 {RATIO} (ref 1.2–2.2)
ALP SERPL-CCNC: 95 IU/L (ref 44–121)
ALT SERPL-CCNC: 64 IU/L (ref 0–32)
AST SERPL-CCNC: 38 IU/L (ref 0–40)
BASOPHILS # BLD AUTO: 0.1 X10E3/UL (ref 0–0.2)
BASOPHILS NFR BLD AUTO: 1 %
BILIRUB SERPL-MCNC: 0.5 MG/DL (ref 0–1.2)
BUN SERPL-MCNC: 15 MG/DL (ref 8–27)
BUN/CREAT SERPL: 15 (ref 12–28)
CALCIUM SERPL-MCNC: 9.1 MG/DL (ref 8.7–10.3)
CHLORIDE SERPL-SCNC: 103 MMOL/L (ref 96–106)
CO2 SERPL-SCNC: 26 MMOL/L (ref 20–29)
CREAT SERPL-MCNC: 0.97 MG/DL (ref 0.57–1)
EGFR: 64 ML/MIN/1.73
EOSINOPHIL # BLD AUTO: 0.1 X10E3/UL (ref 0–0.4)
EOSINOPHIL NFR BLD AUTO: 1 %
ERYTHROCYTE [DISTWIDTH] IN BLOOD BY AUTOMATED COUNT: 12.6 % (ref 11.7–15.4)
GLOBULIN SER-MCNC: 2.1 G/DL (ref 1.5–4.5)
GLUCOSE SERPL-MCNC: 91 MG/DL (ref 70–99)
HCT VFR BLD AUTO: 35.2 % (ref 34–46.6)
HGB BLD-MCNC: 12.5 G/DL (ref 11.1–15.9)
IMM GRANULOCYTES # BLD: 0.1 X10E3/UL (ref 0–0.1)
IMM GRANULOCYTES NFR BLD: 1 %
INR PPP: 0.9 (ref 0.9–1.2)
LYMPHOCYTES # BLD AUTO: 2.7 X10E3/UL (ref 0.7–3.1)
LYMPHOCYTES NFR BLD AUTO: 30 %
MCH RBC QN AUTO: 32.8 PG (ref 26.6–33)
MCHC RBC AUTO-ENTMCNC: 35.5 G/DL (ref 31.5–35.7)
MCV RBC AUTO: 92 FL (ref 79–97)
MONOCYTES # BLD AUTO: 0.6 X10E3/UL (ref 0.1–0.9)
MONOCYTES NFR BLD AUTO: 6 %
NEUTROPHILS # BLD AUTO: 5.5 X10E3/UL (ref 1.4–7)
NEUTROPHILS NFR BLD AUTO: 61 %
PLATELET # BLD AUTO: 141 X10E3/UL (ref 150–450)
POTASSIUM SERPL-SCNC: 4 MMOL/L (ref 3.5–5.2)
PROT SERPL-MCNC: 6.2 G/DL (ref 6–8.5)
PROTHROMBIN TIME: 9.9 SEC (ref 9.1–12)
RBC # BLD AUTO: 3.81 X10E6/UL (ref 3.77–5.28)
REF LAB TEST METHOD: NORMAL
SODIUM SERPL-SCNC: 142 MMOL/L (ref 134–144)
T4 FREE SERPL DIALY-MCNC: 1 NG/DL
TEST INTERPRETATION: NORMAL
TPMT RBC-CCNC: 24.4 UNITS/ML RBC
TSH SERPL-ACNC: 40 UU/ML
WBC # BLD AUTO: 9.1 X10E3/UL (ref 3.4–10.8)

## 2024-05-28 PROCEDURE — 71250 CT THORAX DX C-: CPT

## 2024-06-04 DIAGNOSIS — K75.4 AUTOIMMUNE HEPATITIS (HCC): Primary | ICD-10-CM

## 2024-06-04 DIAGNOSIS — K74.3 PRIMARY BILIARY CIRRHOSIS (HCC): ICD-10-CM

## 2024-06-08 LAB
ALBUMIN SERPL-MCNC: 3.9 G/DL (ref 3.9–4.9)
ALBUMIN/GLOB SERPL: 1.7 {RATIO} (ref 1.2–2.2)
ALP SERPL-CCNC: 85 IU/L (ref 44–121)
ALT SERPL-CCNC: 54 IU/L (ref 0–32)
AST SERPL-CCNC: 35 IU/L (ref 0–40)
BILIRUB SERPL-MCNC: 0.5 MG/DL (ref 0–1.2)
BUN SERPL-MCNC: 23 MG/DL (ref 8–27)
BUN/CREAT SERPL: 26 (ref 12–28)
CALCIUM SERPL-MCNC: 8.7 MG/DL (ref 8.7–10.3)
CHLORIDE SERPL-SCNC: 105 MMOL/L (ref 96–106)
CO2 SERPL-SCNC: 21 MMOL/L (ref 20–29)
CREAT SERPL-MCNC: 0.9 MG/DL (ref 0.57–1)
EGFR: 70 ML/MIN/1.73
GLOBULIN SER-MCNC: 2.3 G/DL (ref 1.5–4.5)
GLUCOSE SERPL-MCNC: 114 MG/DL (ref 70–99)
POTASSIUM SERPL-SCNC: 3.8 MMOL/L (ref 3.5–5.2)
PROT SERPL-MCNC: 6.2 G/DL (ref 6–8.5)
SODIUM SERPL-SCNC: 139 MMOL/L (ref 134–144)

## 2024-06-10 DIAGNOSIS — K74.3 PRIMARY BILIARY CHOLANGITIS (HCC): ICD-10-CM

## 2024-06-10 DIAGNOSIS — K75.4 AUTOIMMUNE HEPATITIS (HCC): ICD-10-CM

## 2024-06-10 RX ORDER — PREDNISONE 5 MG/1
10 TABLET ORAL DAILY
Qty: 180 TABLET | Refills: 1 | Status: SHIPPED | OUTPATIENT
Start: 2024-06-10

## 2024-06-15 LAB
APOB+LDLR+PCSK9 GENE MUT ANL BLD/T: NOT DETECTED
BRCA1+BRCA2 DEL+DUP + FULL MUT ANL BLD/T: NOT DETECTED
MLH1+MSH2+MSH6+PMS2 GN DEL+DUP+FUL M: NOT DETECTED

## 2024-06-23 DIAGNOSIS — K75.4 AUTOIMMUNE HEPATITIS (HCC): ICD-10-CM

## 2024-06-23 DIAGNOSIS — K74.3 PRIMARY BILIARY CHOLANGITIS (HCC): ICD-10-CM

## 2024-06-23 RX ORDER — OMEPRAZOLE 20 MG/1
20 CAPSULE, DELAYED RELEASE ORAL DAILY
Qty: 30 CAPSULE | Refills: 5 | Status: SHIPPED | OUTPATIENT
Start: 2024-06-23

## 2024-07-11 ENCOUNTER — OFFICE VISIT (OUTPATIENT)
Dept: CARDIOLOGY CLINIC | Facility: CLINIC | Age: 69
End: 2024-07-11
Payer: COMMERCIAL

## 2024-07-11 VITALS
HEART RATE: 76 BPM | DIASTOLIC BLOOD PRESSURE: 66 MMHG | SYSTOLIC BLOOD PRESSURE: 126 MMHG | BODY MASS INDEX: 31.65 KG/M2 | HEIGHT: 65 IN | OXYGEN SATURATION: 98 % | WEIGHT: 190 LBS

## 2024-07-11 DIAGNOSIS — I10 PRIMARY HYPERTENSION: Primary | ICD-10-CM

## 2024-07-11 DIAGNOSIS — E78.2 MIXED HYPERLIPIDEMIA: ICD-10-CM

## 2024-07-11 DIAGNOSIS — R79.89 ELEVATED TROPONIN: ICD-10-CM

## 2024-07-11 PROCEDURE — 99214 OFFICE O/P EST MOD 30 MIN: CPT | Performed by: INTERNAL MEDICINE

## 2024-07-11 NOTE — PROGRESS NOTES
Cardiology Follow Up    Estella Stein  1955  [id number]  [facility] [place] [address] [address]  [address] [address]  [phone]    1. Primary hypertension        2. Elevated troponin  Ambulatory referral to Cardiology      3. Mixed hyperlipidemia            Interval History: Followup    No chest pain, dyspnea or palps.     Medical Problems       Problem List       Elevated liver enzymes    Other specified hypothyroidism    Sjogren's disease (HCC)    Thrombocytopenia (HCC)    Other neutropenia (HCC)    Ocular migraine    Autoimmune hepatitis (HCC)    Elevated troponin    Pleuritic chest pain        Past Medical History:   Diagnosis Date    Anemia     I stopped taking iron supplements after I went through menop    Disease of thyroid gland     Hypothyroid     Sjogren's disease (HCC)      Social History     Socioeconomic History    Marital status: /Civil Union     Spouse name: Not on file    Number of children: Not on file    Years of education: Not on file    Highest education level: Not on file   Occupational History    Not on file   Tobacco Use    Smoking status: Former     Current packs/day: 0.00     Average packs/day: 0.3 packs/day for 15.0 years (3.8 ttl pk-yrs)     Types: Cigarettes     Start date: 1971     Quit date: 1979     Years since quittin.1    Smokeless tobacco: Never    Tobacco comments:     I was never a heavy smoker   Vaping Use    Vaping status: Never Used   Substance and Sexual Activity    Alcohol use: Yes     Comment: 1 drink social drinker    Drug use: Never    Sexual activity: Not Currently     Partners: Female     Birth control/protection: Post-menopausal     Comment: when I did use birth control I was on the pill   Other Topics Concern    Not on file   Social History Narrative    Not on file     Social Determinants of Health     Financial Resource Strain: Not on file   Food  Insecurity: No Food Insecurity (4/15/2024)    Hunger Vital Sign     Worried About Running Out of Food in the Last Year: Never true     Ran Out of Food in the Last Year: Never true   Transportation Needs: No Transportation Needs (4/15/2024)    PRAPARE - Transportation     Lack of Transportation (Medical): No     Lack of Transportation (Non-Medical): No   Physical Activity: Not on file   Stress: Not on file   Social Connections: Not on file   Intimate Partner Violence: Not on file   Housing Stability: Low Risk  (4/15/2024)    Housing Stability Vital Sign     Unable to Pay for Housing in the Last Year: No     Number of Times Moved in the Last Year: 1     Homeless in the Last Year: No      Family History   Problem Relation Age of Onset    Breast cancer Mother         late 80s onset    Arthritis Mother     Hypothyroidism Mother     Vision loss Mother     Prostate cancer Father         diagnosed in his 80s    Asthma Father     Hypothyroidism Father     No Known Problems Daughter     Anemia Maternal Grandmother     No Known Problems Maternal Grandfather     No Known Problems Paternal Grandmother     No Known Problems Paternal Grandfather     Skin cancer Brother     Prostate cancer Brother 74    Colon polyps Neg Hx     Colon cancer Neg Hx      Past Surgical History:   Procedure Laterality Date    CARPAL TUNNEL RELEASE       SECTION      COLONOSCOPY      HYSTERECTOMY      2014 approx    IR BIOPSY LIVER RANDOM  2023    LIVER BIOPSY  2017?    OOPHORECTOMY Bilateral     approx 2014    SKIN BIOPSY      TUBAL LIGATION         Current Outpatient Medications:     albuterol (PROVENTIL HFA,VENTOLIN HFA) 90 mcg/act inhaler, Inhale 2 puffs every 6 (six) hours as needed, Disp: , Rfl:     amLODIPine (NORVASC) 5 mg tablet, Take 5 mg by mouth daily, Disp: , Rfl:     azaTHIOprine (IMURAN) 50 mg tablet, Take 2 tablets (100 mg total) by mouth daily, Disp: 180 tablet, Rfl: 3    Calcium Carbonate-Vit D-Min (Calcium 600+D Plus  Minerals) 600-400 MG-UNIT TABS, Take 1 tablet by mouth 2 (two) times a day, Disp: , Rfl:     citalopram (CeleXA) 20 mg tablet, Take 20 mg by mouth daily, Disp: , Rfl:     fexofenadine (ALLEGRA) 180 MG tablet, Take 180 mg by mouth if needed, Disp: , Rfl:     Multiple Vitamin (multivitamin) tablet, Take 1 tablet by mouth daily, Disp: , Rfl:     NON FORMULARY, Garlacin, Disp: , Rfl:     Omega-3 Fatty Acids (OMEGA-3 FISH OIL PO), Take by mouth, Disp: , Rfl:     omeprazole (PriLOSEC) 20 mg delayed release capsule, TAKE 1 CAPSULE BY MOUTH EVERY DAY, Disp: 30 capsule, Rfl: 5    predniSONE 5 mg tablet, Take 2 tablets (10 mg total) by mouth daily, Disp: 180 tablet, Rfl: 1    PreviDent 5000 Dry Mouth 1.1 % GEL, , Disp: , Rfl:     Sodium Fluoride 5000 PPM 1.1 % PSTE, USE 1-2 TIMES AS INSTRUCTED, Disp: , Rfl:     Synthroid 75 MCG tablet, TAKE 1 TABLET BY MOUTH EVERY DAY IN THE MORNING ON AN EMPTY STOMACH FOR 30 DAYS, Disp: , Rfl:     TURMERIC PO, Take by mouth, Disp: , Rfl:     ursodiol (ACTIGALL) 300 mg capsule, TAKE 1 CAPSULE BY MOUTH THREE TIMES A DAY, Disp: 270 capsule, Rfl: 1    valsartan (DIOVAN) 320 MG tablet, Take 320 mg by mouth daily, Disp: , Rfl:     atorvastatin (LIPITOR) 40 mg tablet, Take 1 tablet (40 mg total) by mouth daily with dinner (Patient not taking: Reported on 7/11/2024), Disp: 30 tablet, Rfl: 0  Allergies   Allergen Reactions    Other Tongue Swelling     Eggplant - dysphagia    Shellfish-Derived Products - Food Allergy Swelling       Labs:     Chemistry        Component Value Date/Time     02/12/2014 1545    K 3.8 06/28/2024 1308     06/28/2024 1308    CO2 26 06/28/2024 1308    BUN 26 06/28/2024 1308    CREATININE 1.03 (H) 06/28/2024 1308    CREATININE 0.73 04/15/2024 0521    CREATININE 0.68 02/12/2014 1545        Component Value Date/Time    CALCIUM 8.1 (L) 04/15/2024 0521    CALCIUM 8.5 02/12/2014 1545    ALKPHOS 120 (H) 04/15/2024 0521    ALKPHOS 160 (H) 02/12/2014 1545    AST 38  "06/28/2024 1308    ALT 49 (H) 06/28/2024 1308    BILITOT 0.4 02/12/2014 1545            No results found for: \"CHOL\"  Lab Results   Component Value Date    HDL 85 04/14/2024     Lab Results   Component Value Date    LDLCALC 141 (H) 04/14/2024     Lab Results   Component Value Date    TRIG 68 04/14/2024     No results found for: \"CHOLHDL\"    Imaging: No results found.    EKG: .    Review of Systems   Constitutional: Negative.   HENT: Negative.     Eyes: Negative.    Cardiovascular: Negative.    Respiratory: Negative.     Endocrine: Negative.    Hematologic/Lymphatic: Negative.    Skin: Negative.    Musculoskeletal: Negative.    Gastrointestinal: Negative.    Genitourinary: Negative.    Neurological: Negative.    Psychiatric/Behavioral: Negative.     Allergic/Immunologic: Negative.        Vitals:    07/11/24 1518   BP: 126/66   Pulse: 76   SpO2: 98%           Physical Exam  Vitals and nursing note reviewed.   Constitutional:       Appearance: Normal appearance.   HENT:      Head: Normocephalic.      Nose: Nose normal.      Mouth/Throat:      Mouth: Mucous membranes are moist.   Eyes:      General: No scleral icterus.     Conjunctiva/sclera: Conjunctivae normal.   Cardiovascular:      Rate and Rhythm: Normal rate and regular rhythm.      Heart sounds: No murmur heard.     No gallop.   Pulmonary:      Effort: Pulmonary effort is normal. No respiratory distress.      Breath sounds: Normal breath sounds. No wheezing or rales.   Abdominal:      General: Abdomen is flat. Bowel sounds are normal. There is no distension.      Palpations: Abdomen is soft.      Tenderness: There is no abdominal tenderness. There is no guarding.   Musculoskeletal:      Cervical back: Normal range of motion and neck supple.      Right lower leg: No edema.      Left lower leg: No edema.   Skin:     General: Skin is warm and dry.   Neurological:      General: No focal deficit present.      Mental Status: She is alert and oriented to person, place, " and time.   Psychiatric:         Mood and Affect: Mood normal.         Behavior: Behavior normal.         Discussion/Summary:    Elevated troponin in setting of pneumonia.     Hypertension: Continue with amlodipine and valsartan. BP is controlled.       The patient was counseled regarding diagnostic results, instructions for management, risk factor reductions, impressions. total time of encounter was 25 minutes and 15 minutes was spent counseling.

## 2024-07-23 ENCOUNTER — TRANSCRIBE ORDERS (OUTPATIENT)
Dept: GASTROENTEROLOGY | Facility: CLINIC | Age: 69
End: 2024-07-23

## 2024-07-23 DIAGNOSIS — R79.89 ABNORMAL LIVER FUNCTION TESTS: ICD-10-CM

## 2024-07-23 DIAGNOSIS — K75.4 AUTOIMMUNE HEPATITIS (HCC): ICD-10-CM

## 2024-07-23 RX ORDER — URSODIOL 300 MG/1
300 CAPSULE ORAL 3 TIMES DAILY
Qty: 270 CAPSULE | Refills: 1 | Status: SHIPPED | OUTPATIENT
Start: 2024-07-23

## 2024-08-03 LAB
ALBUMIN SERPL-MCNC: 3.8 G/DL (ref 3.9–4.9)
ALP SERPL-CCNC: 89 IU/L (ref 44–121)
ALT SERPL-CCNC: 46 IU/L (ref 0–32)
AST SERPL-CCNC: 34 IU/L (ref 0–40)
BILIRUB SERPL-MCNC: 0.4 MG/DL (ref 0–1.2)
BUN SERPL-MCNC: 25 MG/DL (ref 8–27)
BUN/CREAT SERPL: 26 (ref 12–28)
CALCIUM SERPL-MCNC: 8.9 MG/DL (ref 8.7–10.3)
CHLORIDE SERPL-SCNC: 104 MMOL/L (ref 96–106)
CHOLEST SERPL-MCNC: 264 MG/DL (ref 100–199)
CO2 SERPL-SCNC: 25 MMOL/L (ref 20–29)
CREAT SERPL-MCNC: 0.96 MG/DL (ref 0.57–1)
EGFR: 65 ML/MIN/1.73
GLOBULIN SER-MCNC: 2.2 G/DL (ref 1.5–4.5)
GLUCOSE SERPL-MCNC: 88 MG/DL (ref 70–99)
HDLC SERPL-MCNC: 91 MG/DL
LDLC SERPL CALC-MCNC: 162 MG/DL (ref 0–99)
LDLC/HDLC SERPL: 1.8 RATIO (ref 0–3.2)
POTASSIUM SERPL-SCNC: 4.6 MMOL/L (ref 3.5–5.2)
PROT SERPL-MCNC: 6 G/DL (ref 6–8.5)
SL AMB VLDL CHOLESTEROL CALC: 11 MG/DL (ref 5–40)
SODIUM SERPL-SCNC: 143 MMOL/L (ref 134–144)
TRIGL SERPL-MCNC: 69 MG/DL (ref 0–149)

## 2024-08-05 ENCOUNTER — TELEPHONE (OUTPATIENT)
Dept: GASTROENTEROLOGY | Facility: CLINIC | Age: 69
End: 2024-08-05

## 2024-08-05 DIAGNOSIS — K75.4 AUTOIMMUNE HEPATITIS (HCC): ICD-10-CM

## 2024-08-05 RX ORDER — AZATHIOPRINE 50 MG/1
150 TABLET ORAL DAILY
Qty: 270 TABLET | Refills: 1 | Status: SHIPPED | OUTPATIENT
Start: 2024-08-05

## 2024-08-05 NOTE — TELEPHONE ENCOUNTER
Gastroenterology Telephone Encounter:    Was able to reach Estella by phone. She is currently taking Prednisone 10mg daily, Imuran 100mg daily, and Ursodiol 300mg TID.     Reviewed current labs with her. LFT's remain stable, platelet count improving. Will titrate Prednisone to 7.5mg daily. Increase Imuran to 150mg daily. Continue ursodiol at current dosing. Patient understanding and agreeable. Will repeat labs around 8/20. Will also review recommendation in regard to titration or ursodiol with Dr. Prabhakar and Letitia Angulo and hopefully discuss at upcoming OV.    Refill of Imuran sent to pharmacy. Patient has appointment with hepatology team on 8/8 which she will keep.     Did discuss side effects of long-term steroid use, and patient is anxious to continue titrating off as able.    Advised to please reach out with any additional questions/concerns.    Gisela Oneal DO  Gastroenterology Fellow  [facility]  Division of Gastroenterology and Hepatology  Available on Mingly

## 2024-08-08 ENCOUNTER — OFFICE VISIT (OUTPATIENT)
Dept: GASTROENTEROLOGY | Facility: CLINIC | Age: 69
End: 2024-08-08
Payer: COMMERCIAL

## 2024-08-08 DIAGNOSIS — K75.4 AUTOIMMUNE HEPATITIS (HCC): Primary | ICD-10-CM

## 2024-08-08 DIAGNOSIS — Z12.11 SCREENING FOR COLON CANCER: ICD-10-CM

## 2024-08-08 DIAGNOSIS — K74.3 PRIMARY BILIARY CHOLANGITIS (HCC): ICD-10-CM

## 2024-08-08 DIAGNOSIS — R79.89 ABNORMAL LIVER FUNCTION TESTS: ICD-10-CM

## 2024-08-08 PROCEDURE — 99213 OFFICE O/P EST LOW 20 MIN: CPT | Performed by: FAMILY MEDICINE

## 2024-08-08 NOTE — PROGRESS NOTES
[facility] - Outpatient Follow-up Note  Estella Stein 69 y.o. female MRN: [id number]  Encounter: [id number]          ASSESSMENT AND PLAN:      1. Autoimmune hepatitis (HCC)  2. Primary biliary cholangitis (HCC)  3. Abnormal liver function tests  Patient with biopsy-proven seronegative AIH with PBC overlap. She was also seen to have chronic thrombocytopenia c/f advanced hepatic fibrosis although her biopsy also showed patchy periportal fibrosis. She is currently taking Imuran 150 mg once daily and prednisone 7.5 mg once daily with a mild but persistently elevated serum ALT. She is also taking ursodiol 300 mg 3 times daily with normalization of her alkaline phosphatase.    She will continue her Imuran, prednisone and ursodiol at their current doses. Her Imuran was recently increased to 150 mg and prednisone tapered to 7.5 mg after review of her labs from 8/2. She will continue to have serial labs every 2 weeks and we will consider further tapering her prednisone if her serum transaminases remain stable/improved and with normal IgG immunoglobulins. If she can be tapered to 5 mg then would recommend she hold this dose for at least 1 month before tapering further. Of note, her ursodiol is slightly under-dosed but will keep as is given her alk phos is normal.    She also reported with vision changes and dizziness. MRI brain was unremarkable and she has been seen in consultation by neurology and evaluated by ophthalmology. Given that she raise these concerns prior to starting prednisone low suspicion that this is contributing to her symptoms but we will keep this in mind to try and taper her as quickly as possible.    Also discussed iscussed routine healthcare maintenance for both AIH and PBC as follows:   - Close monitoring of liver function studies (CBC, CMP, INR)    A.  Standing lab orders for CBC, CMP, INR and IgG immunoglobulins q2 weeks  - U/S elastography as  clinically indicated to screen for advanced fibrosis   A.  Liver biopsy (5/2023) with patchy periportal fibrosis.   B.  Plan for a repeat US elastography in 5/2025.  - Screening for osteopenia/porosis with DEXA scan now and q2-4 yrs   A.  DXA (10/2023) with osteopenia   B.  Recommended vitamin D/calcium supplementation and weightbearing exercise   C.  Plan for a repeat DXA in 10/2025  - Screening for fat-soluble vitamin deficiency (A, D, E and K)     A.  Next due 5/2025  - Screening for hypothyroidism annually   A.  Hypothyroidism is managed by her PCP    - CBC and differential; Standing  - Comprehensive metabolic panel; Standing  - Protime-INR; Standing  - IgG, IgA, IgM; Standing    4. Screening for colon cancer  Patient is up-to-date with CRC screening. Colonoscopy (2/24/2022) with large internal hemorrhoids but otherwise normal. Recommended she repeat a colonoscopy in 5 years due to personal history of colonic polyps. Next due 2/2027.    Follow-up in 3 months or sooner if necessary.  ______________________________________________________________________    SUBJECTIVE: Patient is a 69 y.o. female with PMH significant for ocular migraines, hypothyroidism, Sjogren's disease and seronegative AIH with PBC overlap who presents today for follow-up.     Estella is familiar to [facility] hepatology last seen by Dr. Tim and Dr. Prabhakar on 5/7/2024. She has had chronically abnormal liver test, predominantly hepatocellular pattern, since her 40s. She had a serologic evaluation which revealed a positive LYNSEY and AMA but negative ASMA and normal IgG immunoglobulins.  She ultimately underwent a liver biopsy showing patchy portal and interface hepatitis and patchy periportal fibrosis without evidence of cholestasis. She was diagnosed with seronegative AIH and PBC overlap and started on both ursodiol and prednisone.     Her serum transaminases greatly improved and she was started on Imuran. She has had a persistently elevated serum  ALT and her Imuran has been increased to 150 mg once daily and prednisone taper to 7.5 mg once daily. Her alkaline phosphatase has normalized and she remains on ursodiol 300 mg 3 times daily.    Today, she states that she is feeling well but continues to have intermittent blurry vision and has had a 12 pound weight gain since starting prednisone. She has been seen by an ophthalmologist.      REVIEW OF SYSTEMS IS OTHERWISE NEGATIVE.      Historical Information   Past Medical History:   Diagnosis Date   • Anemia     I stopped taking iron supplements after I went through menop   • Disease of thyroid gland    • Hypothyroid    • Sjogren's disease (HCC)      Past Surgical History:   Procedure Laterality Date   • CARPAL TUNNEL RELEASE     •  SECTION     • COLONOSCOPY     • HYSTERECTOMY       approx   • IR BIOPSY LIVER RANDOM  2023   • LIVER BIOPSY  ?   • OOPHORECTOMY Bilateral     approx    • SKIN BIOPSY     • TUBAL LIGATION       Social History   Social History     Substance and Sexual Activity   Alcohol Use Yes    Comment: 1 drink social drinker     Social History     Substance and Sexual Activity   Drug Use Never     Social History     Tobacco Use   Smoking Status Former   • Current packs/day: 0.00   • Average packs/day: 0.3 packs/day for 15.0 years (3.8 ttl pk-yrs)   • Types: Cigarettes   • Start date: 1971   • Quit date: 1979   • Years since quittin.2   Smokeless Tobacco Never   Tobacco Comments    I was never a heavy smoker     Family History   Problem Relation Age of Onset   • Breast cancer Mother         late 80s onset   • Arthritis Mother    • Hypothyroidism Mother    • Vision loss Mother    • Prostate cancer Father         diagnosed in his 80s   • Asthma Father    • Hypothyroidism Father    • No Known Problems Daughter    • Anemia Maternal Grandmother    • No Known Problems Maternal Grandfather    • No Known Problems Paternal Grandmother    • No Known Problems Paternal  "Grandfather    • Skin cancer Brother    • Prostate cancer Brother 74   • Colon polyps Neg Hx    • Colon cancer Neg Hx        Meds/Allergies       Current Outpatient Medications:   •  albuterol (PROVENTIL HFA,VENTOLIN HFA) 90 mcg/act inhaler  •  amLODIPine (NORVASC) 5 mg tablet  •  azaTHIOprine (IMURAN) 50 mg tablet  •  Calcium Carbonate-Vit D-Min (Calcium 600+D Plus Minerals) 600-400 MG-UNIT TABS  •  citalopram (CeleXA) 20 mg tablet  •  fexofenadine (ALLEGRA) 180 MG tablet  •  Multiple Vitamin (multivitamin) tablet  •  NON FORMULARY  •  Omega-3 Fatty Acids (OMEGA-3 FISH OIL PO)  •  omeprazole (PriLOSEC) 20 mg delayed release capsule  •  predniSONE 5 mg tablet  •  PreviDent 5000 Dry Mouth 1.1 % GEL  •  Sodium Fluoride 5000 PPM 1.1 % PSTE  •  Synthroid 75 MCG tablet  •  TURMERIC PO  •  ursodiol (ACTIGALL) 300 mg capsule  •  valsartan (DIOVAN) 320 MG tablet  •  atorvastatin (LIPITOR) 40 mg tablet    Allergies   Allergen Reactions   • Other Tongue Swelling     Eggplant - dysphagia   • Shellfish-Derived Products - Food Allergy Swelling           Objective     Height 5' 5\" (1.651 m), weight 87 kg (191 lb 12.8 oz). Body mass index is 31.92 kg/m².      PHYSICAL EXAM:      General Appearance:   Alert, cooperative, no distress   HEENT:   Normocephalic, atraumatic, anicteric.     Neck:  Supple, symmetrical, trachea midline   Lungs:   Clear to auscultation bilaterally; no rales, rhonchi or wheezing; respirations unlabored    Heart::   Regular rate and rhythm; no murmur, rub, or gallop.   Abdomen:   Soft, non-tender, non-distended; normal bowel sounds; no masses, no organomegaly    Genitalia:   Deferred    Rectal:   Deferred    Extremities:  No cyanosis, clubbing or edema    Pulses:  2+ and symmetric    Skin:  No jaundice, rashes, or lesions    Lymph nodes:  No palpable cervical lymphadenopathy        Lab Results:   No visits with results within 1 Day(s) from this visit.   Latest known visit with results is:   Orders Only on " 08/02/2024   Component Date Value   • Glucose, Random 08/02/2024 88    • BUN 08/02/2024 25    • Creatinine 08/02/2024 0.96    • eGFR 08/02/2024 65    • SL AMB BUN/CREATININE RA* 08/02/2024 26    • Sodium 08/02/2024 143    • Potassium 08/02/2024 4.6    • Chloride 08/02/2024 104    • CO2 08/02/2024 25    • CALCIUM 08/02/2024 8.9    • Protein, Total 08/02/2024 6.0    • Albumin 08/02/2024 3.8 (L)    • Globulin, Total 08/02/2024 2.2    • TOTAL BILIRUBIN 08/02/2024 0.4    • Alk Phos Isoenzymes 08/02/2024 89    • AST 08/02/2024 34    • ALT 08/02/2024 46 (H)    • Cholesterol, Total 08/02/2024 264 (H)    • Triglycerides 08/02/2024 69    • HDL 08/02/2024 91    • VLDL Cholesterol Calcula* 08/02/2024 11    • LDL Calculated 08/02/2024 162 (H)    • LDl/HDL Ratio 08/02/2024 1.8          Radiology Results:   No results found.    Letitia Angulo PA-C     **Please note: Dictation voice to text software may have been used in the creation of this record. Occasional wrong word or “sound alike” substitutions may have occurred due to the inherent limitations of voice recognition software. Read the chart carefully and recognize, using context, where substitutions have occurred.**

## 2024-08-09 VITALS
DIASTOLIC BLOOD PRESSURE: 66 MMHG | HEIGHT: 65 IN | SYSTOLIC BLOOD PRESSURE: 134 MMHG | BODY MASS INDEX: 31.96 KG/M2 | WEIGHT: 191.8 LBS

## 2024-08-29 ENCOUNTER — PATIENT MESSAGE (OUTPATIENT)
Dept: GASTROENTEROLOGY | Facility: CLINIC | Age: 69
End: 2024-08-29

## 2024-09-03 ENCOUNTER — TELEPHONE (OUTPATIENT)
Dept: GASTROENTEROLOGY | Facility: CLINIC | Age: 69
End: 2024-09-03

## 2024-09-03 ENCOUNTER — TELEPHONE (OUTPATIENT)
Age: 69
End: 2024-09-03

## 2024-09-03 NOTE — TELEPHONE ENCOUNTER
Pt called stated she has not heard from anyone regarding message from [employer] about recent bloodwork. I spoke with Slime JHONSON and she stated she will send a message to Letitia and will call patient back.

## 2024-09-03 NOTE — TELEPHONE ENCOUNTER
Called to speak with patient regarding lab results and recommendations. No answer, LVM to return call. Please refer to the [email or web address] message from 8/29/2024.

## 2024-09-06 LAB
ALBUMIN SERPL-MCNC: 4 G/DL (ref 3.9–4.9)
ALP SERPL-CCNC: 184 IU/L (ref 44–121)
ALT SERPL-CCNC: 175 IU/L (ref 0–32)
AST SERPL-CCNC: 118 IU/L (ref 0–40)
BASOPHILS # BLD AUTO: 0 X10E3/UL (ref 0–0.2)
BASOPHILS NFR BLD AUTO: 1 %
BILIRUB SERPL-MCNC: 0.7 MG/DL (ref 0–1.2)
BUN SERPL-MCNC: 24 MG/DL (ref 8–27)
BUN/CREAT SERPL: 23 (ref 12–28)
CALCIUM SERPL-MCNC: 8.8 MG/DL (ref 8.7–10.3)
CHLORIDE SERPL-SCNC: 104 MMOL/L (ref 96–106)
CO2 SERPL-SCNC: 28 MMOL/L (ref 20–29)
CREAT SERPL-MCNC: 1.06 MG/DL (ref 0.57–1)
EGFR: 58 ML/MIN/1.73
EOSINOPHIL # BLD AUTO: 0.2 X10E3/UL (ref 0–0.4)
EOSINOPHIL NFR BLD AUTO: 4 %
ERYTHROCYTE [DISTWIDTH] IN BLOOD BY AUTOMATED COUNT: 12.7 % (ref 11.7–15.4)
GLOBULIN SER-MCNC: 2.2 G/DL (ref 1.5–4.5)
GLUCOSE SERPL-MCNC: 90 MG/DL (ref 70–99)
HCT VFR BLD AUTO: 34.6 % (ref 34–46.6)
HGB BLD-MCNC: 12.1 G/DL (ref 11.1–15.9)
IGA SERPL-MCNC: 117 MG/DL (ref 87–352)
IGG SERPL-MCNC: 978 MG/DL (ref 586–1602)
IGM SERPL-MCNC: 248 MG/DL (ref 26–217)
IMM GRANULOCYTES # BLD: 0 X10E3/UL (ref 0–0.1)
IMM GRANULOCYTES NFR BLD: 1 %
INR PPP: 1 (ref 0.9–1.2)
LYMPHOCYTES # BLD AUTO: 1.6 X10E3/UL (ref 0.7–3.1)
LYMPHOCYTES NFR BLD AUTO: 34 %
MCH RBC QN AUTO: 35.1 PG (ref 26.6–33)
MCHC RBC AUTO-ENTMCNC: 35 G/DL (ref 31.5–35.7)
MCV RBC AUTO: 100 FL (ref 79–97)
MONOCYTES # BLD AUTO: 0.4 X10E3/UL (ref 0.1–0.9)
MONOCYTES NFR BLD AUTO: 9 %
NEUTROPHILS # BLD AUTO: 2.3 X10E3/UL (ref 1.4–7)
NEUTROPHILS NFR BLD AUTO: 51 %
PLATELET # BLD AUTO: 149 X10E3/UL (ref 150–450)
POTASSIUM SERPL-SCNC: 4.4 MMOL/L (ref 3.5–5.2)
PROT SERPL-MCNC: 6.2 G/DL (ref 6–8.5)
PROTHROMBIN TIME: 10.9 SEC (ref 9.1–12)
RBC # BLD AUTO: 3.45 X10E6/UL (ref 3.77–5.28)
SODIUM SERPL-SCNC: 144 MMOL/L (ref 134–144)
WBC # BLD AUTO: 4.5 X10E3/UL (ref 3.4–10.8)

## 2024-09-09 DIAGNOSIS — K75.4 AUTOIMMUNE HEPATITIS (HCC): Primary | ICD-10-CM

## 2024-09-09 RX ORDER — MYCOPHENOLATE MOFETIL 500 MG/1
500 TABLET ORAL EVERY 12 HOURS SCHEDULED
Qty: 60 TABLET | Refills: 5 | Status: SHIPPED | OUTPATIENT
Start: 2024-09-09

## 2024-09-09 RX ORDER — PREDNISONE 20 MG/1
20 TABLET ORAL DAILY
Qty: 30 TABLET | Refills: 2 | Status: SHIPPED | OUTPATIENT
Start: 2024-09-09

## 2024-10-26 LAB
ALBUMIN SERPL-MCNC: 3.9 G/DL (ref 3.9–4.9)
ALP SERPL-CCNC: 79 IU/L (ref 44–121)
ALT SERPL-CCNC: 46 IU/L (ref 0–32)
AST SERPL-CCNC: 22 IU/L (ref 0–40)
BASOPHILS # BLD AUTO: 0 X10E3/UL (ref 0–0.2)
BASOPHILS NFR BLD AUTO: 0 %
BILIRUB SERPL-MCNC: 0.5 MG/DL (ref 0–1.2)
BUN SERPL-MCNC: 23 MG/DL (ref 8–27)
BUN/CREAT SERPL: 22 (ref 12–28)
CALCIUM SERPL-MCNC: 8.8 MG/DL (ref 8.7–10.3)
CHLORIDE SERPL-SCNC: 102 MMOL/L (ref 96–106)
CO2 SERPL-SCNC: 26 MMOL/L (ref 20–29)
CREAT SERPL-MCNC: 1.03 MG/DL (ref 0.57–1)
EGFR: 60 ML/MIN/1.73
EOSINOPHIL # BLD AUTO: 0.1 X10E3/UL (ref 0–0.4)
EOSINOPHIL NFR BLD AUTO: 1 %
ERYTHROCYTE [DISTWIDTH] IN BLOOD BY AUTOMATED COUNT: 13 % (ref 11.7–15.4)
GGT SERPL-CCNC: 67 IU/L (ref 0–60)
GLOBULIN SER-MCNC: 2 G/DL (ref 1.5–4.5)
GLUCOSE SERPL-MCNC: 90 MG/DL (ref 70–99)
HCT VFR BLD AUTO: 33.2 % (ref 34–46.6)
HGB BLD-MCNC: 12 G/DL (ref 11.1–15.9)
IGA SERPL-MCNC: 158 MG/DL (ref 87–352)
IGG SERPL-MCNC: 943 MG/DL (ref 586–1602)
IGM SERPL-MCNC: 248 MG/DL (ref 26–217)
IMM GRANULOCYTES # BLD: 0.1 X10E3/UL (ref 0–0.1)
IMM GRANULOCYTES NFR BLD: 1 %
INR PPP: 0.9 (ref 0.9–1.2)
LYMPHOCYTES # BLD AUTO: 1.8 X10E3/UL (ref 0.7–3.1)
LYMPHOCYTES NFR BLD AUTO: 25 %
MCH RBC QN AUTO: 34.7 PG (ref 26.6–33)
MCHC RBC AUTO-ENTMCNC: 36.1 G/DL (ref 31.5–35.7)
MCV RBC AUTO: 96 FL (ref 79–97)
MONOCYTES # BLD AUTO: 0.6 X10E3/UL (ref 0.1–0.9)
MONOCYTES NFR BLD AUTO: 9 %
NEUTROPHILS # BLD AUTO: 4.6 X10E3/UL (ref 1.4–7)
NEUTROPHILS NFR BLD AUTO: 64 %
PLATELET # BLD AUTO: 149 X10E3/UL (ref 150–450)
POTASSIUM SERPL-SCNC: 3.8 MMOL/L (ref 3.5–5.2)
PROT SERPL-MCNC: 5.9 G/DL (ref 6–8.5)
PROTHROMBIN TIME: 10.3 SEC (ref 9.1–12)
RBC # BLD AUTO: 3.46 X10E6/UL (ref 3.77–5.28)
SODIUM SERPL-SCNC: 140 MMOL/L (ref 134–144)
WBC # BLD AUTO: 7.2 X10E3/UL (ref 3.4–10.8)

## 2024-11-11 ENCOUNTER — OFFICE VISIT (OUTPATIENT)
Dept: GASTROENTEROLOGY | Facility: CLINIC | Age: 69
End: 2024-11-11
Payer: COMMERCIAL

## 2024-11-11 VITALS
HEIGHT: 65 IN | BODY MASS INDEX: 32.42 KG/M2 | WEIGHT: 194.6 LBS | DIASTOLIC BLOOD PRESSURE: 70 MMHG | SYSTOLIC BLOOD PRESSURE: 128 MMHG

## 2024-11-11 DIAGNOSIS — K75.4 AUTOIMMUNE HEPATITIS (HCC): Primary | ICD-10-CM

## 2024-11-11 DIAGNOSIS — Z12.11 SCREENING FOR COLON CANCER: ICD-10-CM

## 2024-11-11 DIAGNOSIS — R79.89 ABNORMAL LIVER FUNCTION TESTS: ICD-10-CM

## 2024-11-11 DIAGNOSIS — K74.3 PRIMARY BILIARY CHOLANGITIS (HCC): ICD-10-CM

## 2024-11-11 LAB
ALBUMIN SERPL-MCNC: 4 G/DL (ref 3.9–4.9)
ALP SERPL-CCNC: 90 IU/L (ref 44–121)
ALT SERPL-CCNC: 49 IU/L (ref 0–32)
AST SERPL-CCNC: 23 IU/L (ref 0–40)
BASOPHILS # BLD AUTO: 0 X10E3/UL (ref 0–0.2)
BASOPHILS NFR BLD AUTO: 0 %
BILIRUB SERPL-MCNC: 0.5 MG/DL (ref 0–1.2)
BUN SERPL-MCNC: 22 MG/DL (ref 8–27)
BUN/CREAT SERPL: 23 (ref 12–28)
CALCIUM SERPL-MCNC: 9 MG/DL (ref 8.7–10.3)
CHLORIDE SERPL-SCNC: 102 MMOL/L (ref 96–106)
CO2 SERPL-SCNC: 27 MMOL/L (ref 20–29)
CREAT SERPL-MCNC: 0.96 MG/DL (ref 0.57–1)
EGFR: 65 ML/MIN/1.73
EOSINOPHIL # BLD AUTO: 0.2 X10E3/UL (ref 0–0.4)
EOSINOPHIL NFR BLD AUTO: 2 %
ERYTHROCYTE [DISTWIDTH] IN BLOOD BY AUTOMATED COUNT: 13 % (ref 11.7–15.4)
GLOBULIN SER-MCNC: 2.1 G/DL (ref 1.5–4.5)
GLUCOSE SERPL-MCNC: 91 MG/DL (ref 70–99)
HCT VFR BLD AUTO: 33.4 % (ref 34–46.6)
HGB BLD-MCNC: 12.3 G/DL (ref 11.1–15.9)
IGA SERPL-MCNC: 144 MG/DL (ref 87–352)
IGG SERPL-MCNC: 915 MG/DL (ref 586–1602)
IGM SERPL-MCNC: 248 MG/DL (ref 26–217)
IMM GRANULOCYTES # BLD: 0.2 X10E3/UL (ref 0–0.1)
IMM GRANULOCYTES NFR BLD: 2 %
INR PPP: 1 (ref 0.9–1.2)
LYMPHOCYTES # BLD AUTO: 2.3 X10E3/UL (ref 0.7–3.1)
LYMPHOCYTES NFR BLD AUTO: 23 %
MCH RBC QN AUTO: 35.7 PG (ref 26.6–33)
MCHC RBC AUTO-ENTMCNC: 36.8 G/DL (ref 31.5–35.7)
MCV RBC AUTO: 97 FL (ref 79–97)
MONOCYTES # BLD AUTO: 0.8 X10E3/UL (ref 0.1–0.9)
MONOCYTES NFR BLD AUTO: 8 %
MORPHOLOGY BLD-IMP: ABNORMAL
NEUTROPHILS # BLD AUTO: 6.2 X10E3/UL (ref 1.4–7)
NEUTROPHILS NFR BLD AUTO: 65 %
PLATELET # BLD AUTO: 150 X10E3/UL (ref 150–450)
POTASSIUM SERPL-SCNC: 3.9 MMOL/L (ref 3.5–5.2)
PROT SERPL-MCNC: 6.1 G/DL (ref 6–8.5)
PROTHROMBIN TIME: 10.9 SEC (ref 9.1–12)
RBC # BLD AUTO: 3.45 X10E6/UL (ref 3.77–5.28)
SODIUM SERPL-SCNC: 142 MMOL/L (ref 134–144)
WBC # BLD AUTO: 9.7 X10E3/UL (ref 3.4–10.8)

## 2024-11-11 PROCEDURE — 99213 OFFICE O/P EST LOW 20 MIN: CPT | Performed by: FAMILY MEDICINE

## 2024-11-11 NOTE — PROGRESS NOTES
[facility] - Outpatient Follow-up Note  Estella Stein 69 y.o. female MRN: [id number]  Encounter: [id number]          ASSESSMENT AND PLAN:      1. Autoimmune hepatitis (HCC)  2. Primary biliary cholangitis (HCC)  3. Abnormal liver function tests  Patient with biopsy-proven seronegative AIH with PBC overlap. She was also seen to have chronic thrombocytopenia c/f advanced hepatic fibrosis although her biopsy also showed patchy periportal fibrosis. Since her last appointment, she was seen to have uptrending serum transaminases and alkaline phosphatase c/f AIH flare vs intolerance to Imuran. Her Imuran was stopped, she was transitioned to CellCept 500 mg twice daily and her prednisone was increased to 20 mg once daily.    Fortunately, her labs show improved liver chemistries. She reportedly had labs drawn on 11/9 which are unavailable for review at the time of today's appointment. Plan to continue with her current dose of CellCept (500 mg twice daily) and ursodiol (300 mg 3 times daily). If her labs showed normalization of her transaminases then would recommend tapering her prednisone by 5 mg with repeat labs q2 weeks.     Her dizziness has also significantly improved since she stopped taking her supplements and she will remain off of these for the time being.    Also reviewed routine healthcare maintenance for both AIH and PBC as follows:   Close monitoring of liver function studies (CBC, CMP, INR)   - Standing lab orders for CBC, CMP, INR and IgG immunoglobulins q2 weeks    U/S elastography as clinically indicated to screen for advanced fibrosis  - Liver biopsy (5/2023) with patchy periportal fibrosis.  - Plan for a repeat US elastography in 5/2025.    Screening for osteopenia/porosis with DEXA scan now and q2-4 yrs  - DXA (10/2023) with osteopenia  - Recommended vitamin D/calcium supplementation and weightbearing exercise  - Plan for a repeat DXA in 10/2025    Screening  for fat-soluble vitamin deficiency (A, D, E and K)   - Next due 5/2025    Screening for hypothyroidism annually  - Hypothyroidism is managed by her PCP    4. Screening for colon cancer  Patient is up-to-date with CRC screening. Colonoscopy (2/24/2022) with large internal hemorrhoids but otherwise normal. Recommended she repeat a colonoscopy in 5 years due to personal history of colonic polyps. Next due 2/2027.       Follow-up in 3 months or sooner if necessary.     ______________________________________________________________________    SUBJECTIVE: Patient is a 69 y.o. female  with PMH significant for ocular migraines, hypothyroidism, Sjogren's disease and seronegative AIH with PBC overlap who presents today for follow-up.     Interval history  - Serial labs with uptrending serum transaminases and alkaline phosphatase but normal IgG immunoglobulins c/f AIH flare vs intolerance to Imuran.  - Her imuran was stopped, she was started on CellCept 500 mg twice weekly and her prednisone was increased to 20 mg once daily.  - Most recent labs (10/25) show downtrending serum transaminases and normalization of her alkaline phosphatase. [facility] but they are still in process at the time of today's appointment.   - She had the stomach flu and had a brief period where she was not taking her medications.   - Restarted Cellcept and Imuran 4 weeks prior to her most recent set of labs.   - Dizziness is much improved since stopping her supplements.     Extended liver history  Estella is familiar to [facility] hepatology last seen by Dr. Tim and Dr. Prabhakar on 5/7/2024. She has had chronically abnormal liver test, predominantly hepatocellular pattern, since her 40s. She had a serologic evaluation which revealed a positive LYNSEY and AMA but negative ASMA and normal IgG immunoglobulins.  She ultimately underwent a liver biopsy showing patchy portal and interface hepatitis and patchy periportal fibrosis  without evidence of cholestasis. She was diagnosed with seronegative AIH and PBC overlap and started on both ursodiol and prednisone.      Her serum transaminases greatly improved and she was started on Imuran. She has had a persistently elevated serum ALT and her Imuran has been increased to 150 mg once daily and prednisone taper to 7.5 mg once daily. Her alkaline phosphatase has normalized and she remains on ursodiol 300 mg 3 times daily.     Continues to have intermittent blurry vision and has had a 12 pound weight gain since starting prednisone. She has been seen by an ophthalmologist.      REVIEW OF SYSTEMS IS OTHERWISE NEGATIVE.      Historical Information   Past Medical History:   Diagnosis Date    Allergic rhinitis     Anemia     I stopped taking iron supplements after I went through menop    Dental disease  angelina related to Sjogren's    Disease of thyroid gland     Dizziness recent    Fatigue     Headache(784.0)     Hypothyroid     Sjogren's disease (HCC)     Tinnitus recent    Tonsillitis      Past Surgical History:   Procedure Laterality Date    CARPAL TUNNEL RELEASE       SECTION      COLONOSCOPY      HYSTERECTOMY       approx    IR BIOPSY LIVER RANDOM  2023    LIVER BIOPSY  2017?    OOPHORECTOMY Bilateral     approx 2014    SKIN BIOPSY      TONSILLECTOMY  1963    TUBAL LIGATION       Social History   Social History     Substance and Sexual Activity   Alcohol Use Yes    Comment: 1 drink social drinker     Social History     Substance and Sexual Activity   Drug Use Never     Social History     Tobacco Use   Smoking Status Former    Current packs/day: 0.00    Average packs/day: 0.3 packs/day for 15.0 years (3.8 ttl pk-yrs)    Types: Cigarettes    Start date: 1971    Quit date: 1979    Years since quittin.4   Smokeless Tobacco Never   Tobacco Comments    I was never a heavy smoker     Family History   Problem Relation Age of Onset    Breast cancer Mother         late 80s  onset    Arthritis Mother     Hypothyroidism Mother     Vision loss Mother     Prostate cancer Father         diagnosed in his 80s    Asthma Father     Hypothyroidism Father     No Known Problems Daughter     Anemia Maternal Grandmother     No Known Problems Maternal Grandfather     No Known Problems Paternal Grandmother     No Known Problems Paternal Grandfather     Skin cancer Brother     Prostate cancer Brother 74    Colon polyps Neg Hx     Colon cancer Neg Hx        Meds/Allergies       Current Outpatient Medications:     albuterol (PROVENTIL HFA,VENTOLIN HFA) 90 mcg/act inhaler    amLODIPine (NORVASC) 5 mg tablet    atorvastatin (LIPITOR) 40 mg tablet    Calcium Carbonate-Vit D-Min (Calcium 600+D Plus Minerals) 600-400 MG-UNIT TABS    citalopram (CeleXA) 20 mg tablet    famotidine (PEPCID) 40 MG tablet    fexofenadine (ALLEGRA) 180 MG tablet    Multiple Vitamin (multivitamin) tablet    mycophenolate (CELLCEPT) 500 mg tablet    NON FORMULARY    Omega-3 Fatty Acids (OMEGA-3 FISH OIL PO)    omeprazole (PriLOSEC) 40 MG capsule    predniSONE 20 mg tablet    PreviDent 5000 Dry Mouth 1.1 % GEL    Sodium Fluoride 5000 PPM 1.1 % PSTE    Synthroid 75 MCG tablet    TURMERIC PO    ursodiol (ACTIGALL) 300 mg capsule    valsartan (DIOVAN) 320 MG tablet    Allergies   Allergen Reactions    Other Tongue Swelling     Eggplant - dysphagia    Shellfish-Derived Products - Food Allergy Swelling           Objective     There were no vitals taken for this visit. There is no height or weight on file to calculate BMI.      PHYSICAL EXAM:      General Appearance:   Alert, cooperative, no distress   HEENT:   Normocephalic, atraumatic, anicteric.     Neck:  Supple, symmetrical, trachea midline   Lungs:   Clear to auscultation bilaterally; no rales, rhonchi or wheezing; respirations unlabored    Heart::   Regular rate and rhythm; no murmur, rub, or gallop.   Abdomen:   Soft, non-tender, non-distended; normal bowel sounds; no masses, no  organomegaly    Genitalia:   Deferred    Rectal:   Deferred    Extremities:  No cyanosis, clubbing or edema    Pulses:  2+ and symmetric    Skin:  No jaundice, rashes, or lesions    Lymph nodes:  No palpable cervical lymphadenopathy        Lab Results:   No visits with results within 1 Day(s) from this visit.   Latest known visit with results is:   Ancillary Orders on 09/27/2024   Component Date Value    White Blood Cell Count 10/02/2024 7.0     Red Blood Cell Count 10/02/2024 3.51 (L)     Hemoglobin 10/02/2024 12.2     HCT 10/02/2024 34.0     MCV 10/02/2024 97     MCH 10/02/2024 34.8 (H)     MCHC 10/02/2024 35.9 (H)     RDW 10/02/2024 13.5     Platelet Count 10/02/2024 143 (L)     Neutrophils 10/02/2024 83     Lymphocytes 10/02/2024 10     Monocytes 10/02/2024 6     Eosinophils 10/02/2024 0     Basophils PCT 10/02/2024 0     Neutrophils (Absolute) 10/02/2024 5.7     Lymphocytes (Absolute) 10/02/2024 0.7     Monocytes (Absolute) 10/02/2024 0.4     Eosinophils (Absolute) 10/02/2024 0.0     Basophils ABS 10/02/2024 0.0     Immature Granulocytes 10/02/2024 1     Immature Granulocytes (A* 10/02/2024 0.1     Glucose, Random 10/02/2024 108 (H)     BUN 10/02/2024 25     Creatinine 10/02/2024 0.89     eGFR 10/02/2024 71     SL AMB BUN/CREATININE RA* 10/02/2024 28     Sodium 10/02/2024 140     Potassium 10/02/2024 4.7     Chloride 10/02/2024 104     CO2 10/02/2024 23     CALCIUM 10/02/2024 8.8     Protein, Total 10/02/2024 6.4     Albumin 10/02/2024 4.1     Globulin, Total 10/02/2024 2.3     TOTAL BILIRUBIN 10/02/2024 0.5     Alk Phos Isoenzymes 10/02/2024 89     AST 10/02/2024 41 (H)     ALT 10/02/2024 93 (H)     INR 10/02/2024 0.9     Prothrombin Time 10/02/2024 10.2     IgG 10/02/2024 931     IgA 10/02/2024 121     IGM 10/02/2024 229 (H)          Radiology Results:   No results found.    Letitia Angulo PA-C     **Please note: Dictation voice to text software may have been used in the creation of this record. Occasional  wrong word or “sound alike” substitutions may have occurred due to the inherent limitations of voice recognition software. Read the chart carefully and recognize, using context, where substitutions have occurred.**

## 2024-11-13 ENCOUNTER — RESULTS FOLLOW-UP (OUTPATIENT)
Age: 69
End: 2024-11-13

## 2024-11-22 ENCOUNTER — RESULTS FOLLOW-UP (OUTPATIENT)
Age: 69
End: 2024-11-22

## 2024-11-22 LAB
ALBUMIN SERPL-MCNC: 4 G/DL (ref 3.9–4.9)
ALP SERPL-CCNC: 73 IU/L (ref 44–121)
ALT SERPL-CCNC: 30 IU/L (ref 0–32)
AST SERPL-CCNC: 23 IU/L (ref 0–40)
BASOPHILS # BLD AUTO: 0.1 X10E3/UL (ref 0–0.2)
BASOPHILS NFR BLD AUTO: 1 %
BILIRUB SERPL-MCNC: 0.5 MG/DL (ref 0–1.2)
BUN SERPL-MCNC: 26 MG/DL (ref 8–27)
BUN/CREAT SERPL: 24 (ref 12–28)
CALCIUM SERPL-MCNC: 8.9 MG/DL (ref 8.7–10.3)
CHLORIDE SERPL-SCNC: 101 MMOL/L (ref 96–106)
CO2 SERPL-SCNC: 24 MMOL/L (ref 20–29)
CREAT SERPL-MCNC: 1.08 MG/DL (ref 0.57–1)
EGFR: 56 ML/MIN/1.73
EOSINOPHIL # BLD AUTO: 0.1 X10E3/UL (ref 0–0.4)
EOSINOPHIL NFR BLD AUTO: 2 %
ERYTHROCYTE [DISTWIDTH] IN BLOOD BY AUTOMATED COUNT: 13 % (ref 11.7–15.4)
GLOBULIN SER-MCNC: 2.1 G/DL (ref 1.5–4.5)
GLUCOSE SERPL-MCNC: 90 MG/DL (ref 70–99)
HCT VFR BLD AUTO: 32.6 % (ref 34–46.6)
HGB BLD-MCNC: 12.1 G/DL (ref 11.1–15.9)
IGA SERPL-MCNC: 139 MG/DL (ref 87–352)
IGG SERPL-MCNC: 906 MG/DL (ref 586–1602)
IGM SERPL-MCNC: 251 MG/DL (ref 26–217)
IMM GRANULOCYTES # BLD: 0.2 X10E3/UL (ref 0–0.1)
IMM GRANULOCYTES NFR BLD: 2 %
INR PPP: 0.9 (ref 0.9–1.2)
LYMPHOCYTES # BLD AUTO: 2 X10E3/UL (ref 0.7–3.1)
LYMPHOCYTES NFR BLD AUTO: 26 %
MCH RBC QN AUTO: 36.1 PG (ref 26.6–33)
MCHC RBC AUTO-ENTMCNC: 37.1 G/DL (ref 31.5–35.7)
MCV RBC AUTO: 97 FL (ref 79–97)
MONOCYTES # BLD AUTO: 0.7 X10E3/UL (ref 0.1–0.9)
MONOCYTES NFR BLD AUTO: 9 %
MORPHOLOGY BLD-IMP: ABNORMAL
NEUTROPHILS # BLD AUTO: 4.5 X10E3/UL (ref 1.4–7)
NEUTROPHILS NFR BLD AUTO: 60 %
PLATELET # BLD AUTO: 138 X10E3/UL (ref 150–450)
POTASSIUM SERPL-SCNC: 3.8 MMOL/L (ref 3.5–5.2)
PROT SERPL-MCNC: 6.1 G/DL (ref 6–8.5)
PROTHROMBIN TIME: 10.2 SEC (ref 9.1–12)
RBC # BLD AUTO: 3.35 X10E6/UL (ref 3.77–5.28)
SODIUM SERPL-SCNC: 140 MMOL/L (ref 134–144)
WBC # BLD AUTO: 7.6 X10E3/UL (ref 3.4–10.8)

## 2024-12-06 LAB
ALBUMIN SERPL-MCNC: 4.1 G/DL (ref 3.9–4.9)
ALP SERPL-CCNC: 80 IU/L (ref 44–121)
ALT SERPL-CCNC: 43 IU/L (ref 0–32)
AST SERPL-CCNC: 28 IU/L (ref 0–40)
BASOPHILS # BLD AUTO: 0 X10E3/UL (ref 0–0.2)
BASOPHILS NFR BLD AUTO: 1 %
BILIRUB SERPL-MCNC: 0.4 MG/DL (ref 0–1.2)
BUN SERPL-MCNC: 23 MG/DL (ref 8–27)
BUN/CREAT SERPL: 24 (ref 12–28)
CALCIUM SERPL-MCNC: 8.7 MG/DL (ref 8.7–10.3)
CHLORIDE SERPL-SCNC: 102 MMOL/L (ref 96–106)
CO2 SERPL-SCNC: 24 MMOL/L (ref 20–29)
CREAT SERPL-MCNC: 0.96 MG/DL (ref 0.57–1)
EGFR: 65 ML/MIN/1.73
EOSINOPHIL # BLD AUTO: 0.1 X10E3/UL (ref 0–0.4)
EOSINOPHIL NFR BLD AUTO: 2 %
ERYTHROCYTE [DISTWIDTH] IN BLOOD BY AUTOMATED COUNT: 13 % (ref 11.7–15.4)
GLOBULIN SER-MCNC: 1.9 G/DL (ref 1.5–4.5)
GLUCOSE SERPL-MCNC: 83 MG/DL (ref 70–99)
HCT VFR BLD AUTO: 32.8 % (ref 34–46.6)
HGB BLD-MCNC: 12 G/DL (ref 11.1–15.9)
IGA SERPL-MCNC: 155 MG/DL (ref 87–352)
IGG SERPL-MCNC: 961 MG/DL (ref 586–1602)
IGM SERPL-MCNC: 259 MG/DL (ref 26–217)
IMM GRANULOCYTES # BLD: 0.2 X10E3/UL (ref 0–0.1)
IMM GRANULOCYTES NFR BLD: 3 %
INR PPP: 0.9 (ref 0.9–1.2)
LYMPHOCYTES # BLD AUTO: 1.7 X10E3/UL (ref 0.7–3.1)
LYMPHOCYTES NFR BLD AUTO: 25 %
MCH RBC QN AUTO: 35.2 PG (ref 26.6–33)
MCHC RBC AUTO-ENTMCNC: 36.6 G/DL (ref 31.5–35.7)
MCV RBC AUTO: 96 FL (ref 79–97)
MONOCYTES # BLD AUTO: 0.7 X10E3/UL (ref 0.1–0.9)
MONOCYTES NFR BLD AUTO: 11 %
MORPHOLOGY BLD-IMP: ABNORMAL
NEUTROPHILS # BLD AUTO: 4.1 X10E3/UL (ref 1.4–7)
NEUTROPHILS NFR BLD AUTO: 58 %
PLATELET # BLD AUTO: 150 X10E3/UL (ref 150–450)
POTASSIUM SERPL-SCNC: 3.9 MMOL/L (ref 3.5–5.2)
PROT SERPL-MCNC: 6 G/DL (ref 6–8.5)
PROTHROMBIN TIME: 10.1 SEC (ref 9.1–12)
RBC # BLD AUTO: 3.41 X10E6/UL (ref 3.77–5.28)
SODIUM SERPL-SCNC: 140 MMOL/L (ref 134–144)
WBC # BLD AUTO: 7 X10E3/UL (ref 3.4–10.8)

## 2024-12-10 DIAGNOSIS — K75.4 AUTOIMMUNE HEPATITIS (HCC): ICD-10-CM

## 2024-12-10 RX ORDER — PREDNISONE 5 MG/1
15 TABLET ORAL DAILY
Qty: 90 TABLET | Refills: 2 | Status: SHIPPED | OUTPATIENT
Start: 2024-12-10

## 2024-12-13 ENCOUNTER — RESULTS FOLLOW-UP (OUTPATIENT)
Age: 69
End: 2024-12-13

## 2024-12-19 ENCOUNTER — HOSPITAL ENCOUNTER (OUTPATIENT)
Dept: CT IMAGING | Facility: HOSPITAL | Age: 69
Discharge: HOME | End: 2024-12-19
Attending: FAMILY MEDICINE
Payer: COMMERCIAL

## 2024-12-19 DIAGNOSIS — R91.1 PULMONARY NODULE: ICD-10-CM

## 2024-12-19 PROCEDURE — 71250 CT THORAX DX C-: CPT

## 2024-12-20 ENCOUNTER — PATIENT MESSAGE (OUTPATIENT)
Dept: GASTROENTEROLOGY | Facility: CLINIC | Age: 69
End: 2024-12-20

## 2024-12-20 DIAGNOSIS — K75.4 AUTOIMMUNE HEPATITIS (HCC): Primary | ICD-10-CM

## 2024-12-20 DIAGNOSIS — K74.3 PRIMARY BILIARY CHOLANGITIS (HCC): ICD-10-CM

## 2025-01-01 ENCOUNTER — RESULTS FOLLOW-UP (OUTPATIENT)
Age: 70
End: 2025-01-01

## 2025-01-03 ENCOUNTER — TELEPHONE (OUTPATIENT)
Age: 70
End: 2025-01-03

## 2025-01-03 ENCOUNTER — TRANSCRIBE ORDERS (OUTPATIENT)
Age: 70
End: 2025-01-03

## 2025-01-03 NOTE — TELEPHONE ENCOUNTER
Demographics, last OV note and insurance information faxed to [facility], Fax confirmation scanned into chart.

## 2025-01-07 LAB
ANNOTATION COMMENT IMP: NORMAL
BASOPHILS # BLD AUTO: 0.1 X10E3/UL (ref 0–0.2)
BASOPHILS NFR BLD AUTO: 1 %
CLINICAL INFO: NORMAL
EOSINOPHIL # BLD AUTO: 0.1 X10E3/UL (ref 0–0.4)
EOSINOPHIL NFR BLD AUTO: 2 %
ERYTHROCYTE [DISTWIDTH] IN BLOOD BY AUTOMATED COUNT: 12.5 % (ref 11.7–15.4)
FOLATE SERPL-MCNC: 7.2 NG/ML
HCT VFR BLD AUTO: 31.3 % (ref 34–46.6)
HGB BLD-MCNC: 11.7 G/DL (ref 11.1–15.9)
IMM GRANULOCYTES # BLD: 0.1 X10E3/UL (ref 0–0.1)
IMM GRANULOCYTES NFR BLD: 2 %
IMMUNOPHENOTYPING STUDY: NORMAL
LABORATORY COMMENT REPORT: NORMAL
LYMPHOCYTES # BLD AUTO: 2.2 X10E3/UL (ref 0.7–3.1)
LYMPHOCYTES NFR BLD AUTO: 28 %
MCH RBC QN AUTO: 36.2 PG (ref 26.6–33)
MCHC RBC AUTO-ENTMCNC: 37.4 G/DL (ref 31.5–35.7)
MCV RBC AUTO: 97 FL (ref 79–97)
METHYLMALONATE SERPL-SCNC: 416 NMOL/L (ref 0–378)
MONOCYTES # BLD AUTO: 0.7 X10E3/UL (ref 0.1–0.9)
MONOCYTES NFR BLD AUTO: 9 %
MORPHOLOGY BLD-IMP: ABNORMAL
NEUTROPHILS # BLD AUTO: 4.7 X10E3/UL (ref 1.4–7)
NEUTROPHILS NFR BLD AUTO: 58 %
PATH INTERP SPEC-IMP: NORMAL
PATHOLOGIST NAME: NORMAL
PLATELET # BLD AUTO: 149 X10E3/UL (ref 150–450)
RBC # BLD AUTO: 3.23 X10E6/UL (ref 3.77–5.28)
SL AMB ANALYSIS AND GATING STRATEGY: NORMAL
SL AMB ASSESSMENT OF LEUKOCYTES: NORMAL
SPECIMEN SOURCE: NORMAL
VIABLE CELLS NFR SPEC: NORMAL %
WBC # BLD AUTO: 8 X10E3/UL (ref 3.4–10.8)

## 2025-01-10 ENCOUNTER — RESULTS FOLLOW-UP (OUTPATIENT)
Age: 70
End: 2025-01-10

## 2025-01-10 NOTE — TELEPHONE ENCOUNTER
Called patient and scheduled FU APPT with Kristina for 2/20  1130 am.  Date and time is fine with patient.

## 2025-01-15 ENCOUNTER — TELEPHONE (OUTPATIENT)
Age: 70
End: 2025-01-15

## 2025-01-15 DIAGNOSIS — K75.4 AUTOIMMUNE HEPATITIS (HCC): Primary | ICD-10-CM

## 2025-01-15 DIAGNOSIS — D69.6 THROMBOCYTOPENIA (HCC): Primary | ICD-10-CM

## 2025-01-15 DIAGNOSIS — K74.3 PRIMARY BILIARY CHOLANGITIS (HCC): ICD-10-CM

## 2025-01-15 NOTE — TELEPHONE ENCOUNTER
Please see TE from earlier today. Pt was advised to call [place] to register at that time. Called pt again LVM advising of information received and MCM sent to pt with call back number for questions/concerns

## 2025-01-15 NOTE — TELEPHONE ENCOUNTER
[facility] called, received referral forms Hepatology for pt. To be seen by [facility] Dr. Rubalcava, pt. Was never seen at [facility] and will have to call and register herself at [phone], tell them she would like to schedule an appointment with Dr. Rubalcava, pt. Will need to be triaged and depending on triage they may have to schedule her with someone else, please advise patient , [place] does not call patient to register them for referral appointment , pt. Will have to call herself , just wanted to make provider aware

## 2025-01-17 ENCOUNTER — TELEPHONE (OUTPATIENT)
Age: 70
End: 2025-01-17

## 2025-01-17 NOTE — TELEPHONE ENCOUNTER
Patient had B12 level drawn as ordered by DOLLY Dalal. She was reviewing her labs and was not sure if she should repeat her T cell receptor gamma gene rearrange PCR and the T cell receptor beta gene rearrange PCR prior to the appointment she has on 2/20 with Lilian. She last had these labs drawn in April 2024 but does not remember if they are needed again.    She would appreciate a MyChart message to advise whether or not she needs to repeat these labs (may need new order if required) or if only the B12 is necessary.

## 2025-01-18 DIAGNOSIS — K75.4 AUTOIMMUNE HEPATITIS (HCC): ICD-10-CM

## 2025-01-18 DIAGNOSIS — R79.89 ABNORMAL LIVER FUNCTION TESTS: ICD-10-CM

## 2025-01-18 LAB — VIT B12 SERPL-MCNC: 624 PG/ML (ref 232–1245)

## 2025-01-20 RX ORDER — URSODIOL 300 MG/1
300 CAPSULE ORAL 3 TIMES DAILY
Qty: 270 CAPSULE | Refills: 1 | Status: SHIPPED | OUTPATIENT
Start: 2025-01-20

## 2025-01-23 ENCOUNTER — HOSPITAL ENCOUNTER (OUTPATIENT)
Dept: MAMMOGRAPHY | Facility: CLINIC | Age: 70
Discharge: HOME | End: 2025-01-23
Payer: COMMERCIAL

## 2025-01-23 VITALS — BODY MASS INDEX: 30.82 KG/M2 | HEIGHT: 65 IN | WEIGHT: 185 LBS

## 2025-01-23 DIAGNOSIS — Z12.31 ENCOUNTER FOR SCREENING MAMMOGRAM FOR MALIGNANT NEOPLASM OF BREAST: ICD-10-CM

## 2025-01-23 PROCEDURE — 77063 BREAST TOMOSYNTHESIS BI: CPT

## 2025-01-23 PROCEDURE — 77067 SCR MAMMO BI INCL CAD: CPT

## 2025-02-17 NOTE — ASSESSMENT & PLAN NOTE
Very mild.  Suspect secondary to underlying autoimmune disease.  Patient does have Sjogren's syndrome, history of autoimmune hepatitis.  Will continue to monitor    MMA resulted slightly elevated, serum B12 normal.  I have recommended she start daily B12 supplementation    Orders:    CBC and differential; Future    Comprehensive metabolic panel; Future    cyanocobalamin (VITAMIN B-12) 1000 MCG tablet; Take 1 tablet (1,000 mcg total) by mouth daily    Vitamin B12; Future    Methylmalonic acid, serum; Future

## 2025-02-17 NOTE — PROGRESS NOTES
Name: Estella Stein      : 1955      MRN: [id number]  Encounter Provider: DOLLY Hui  Encounter Date: 2025   Encounter department: [facility]AT[facility]  :  Assessment & Plan  Thrombocytopenia (HCC)  Very mild.  Suspect secondary to underlying autoimmune disease.  Patient does have Sjogren's syndrome, history of autoimmune hepatitis.  Will continue to monitor    MMA resulted slightly elevated, serum B12 normal.  I have recommended she start daily B12 supplementation    Orders:    CBC and differential; Future    Comprehensive metabolic panel; Future    cyanocobalamin (VITAMIN B-12) 1000 MCG tablet; Take 1 tablet (1,000 mcg total) by mouth daily    Vitamin B12; Future    Methylmalonic acid, serum; Future    Neutropenia, unspecified type (HCC)  Intermittent.  No evidence of leukopenia or neutropenia on most recent blood work  Continue to monitor  Workup was negative for any underlying hematologic cause.    Orders:    CBC and differential; Future    Comprehensive metabolic panel; Future    cyanocobalamin (VITAMIN B-12) 1000 MCG tablet; Take 1 tablet (1,000 mcg total) by mouth daily    Vitamin B12; Future    Methylmalonic acid, serum; Future    Estella will continue on yearly observation for her mild cytopenias which are likely secondary to her underlying autoimmune conditions.  She is aware to call anytime with questions or concerns.  Should she develop any acute worsening of her cytopenias we will see her sooner.    Return in about 1 year (around 2026) for Kristina, bloodwork.    History of Present Illness   Chief Complaint   Patient presents with    Follow-up     Estella Stein is a 68-year-old female with a medical history of hypothyroidism, Sjogren's disease not on medication, autoimmune hepatitis with transaminitis and hyperbilirubinemia.  She follows closely with hepatology. She was referred to hematology in 2024 for evaluation of  "intermittent neutropenia, worsening thrombocytopenia.     Pertinent Medical History     4/18/24:  T-Cell PCR Interpretation Comment   Comment: NEGATIVE: No clonal T-cell receptor gamma (TCRG) population was            detected     1/19/24:  T-Cell PCR Interpretation Comment   Comment: POSITIVE: A clonal T-cell receptor gamma (TCRG) population was            detected       02/20/25: Estella presents for follow-up for history of cytopenias.  She reports she has an appointment at [place] given her AIH with PBC overlap.  She has been unable to wean off prednisone  Overall, she is doing quite well  Recent blood work demonstrates normal white count, hemoglobin 11.8, platelet count 142, differential normal.  CMP shows normal serum chemistries, ALT continues to trend down, now 40.  Alk phos, AST and T. bili are normal.  Serum B12 600, MMA slightly elevated       Review of Systems   HENT:          Dry mouth    All other systems reviewed and are negative.    Medical History Reviewed by provider this encounter.      Objective   /80 (BP Location: Left arm, Patient Position: Sitting, Cuff Size: Adult)   Pulse 76   Temp 98 °F (36.7 °C) (Temporal)   Resp 16   Ht 5' 5\" (1.651 m)   Wt 86.6 kg (191 lb)   SpO2 95%   BMI 31.78 kg/m²     Physical Exam  Constitutional:       General: She is not in acute distress.     Appearance: Normal appearance.   HENT:      Head: Normocephalic and atraumatic.   Eyes:      General: No scleral icterus.        Right eye: No discharge.         Left eye: No discharge.      Conjunctiva/sclera: Conjunctivae normal.   Cardiovascular:      Rate and Rhythm: Normal rate and regular rhythm.   Pulmonary:      Effort: Pulmonary effort is normal. No respiratory distress.      Breath sounds: Normal breath sounds.   Abdominal:      General: Bowel sounds are normal. There is no distension.      Palpations: Abdomen is soft. There is no mass.      Tenderness: There is no abdominal tenderness.   Musculoskeletal:    "      General: Normal range of motion.   Lymphadenopathy:      Cervical: No cervical adenopathy.      Upper Body:      Right upper body: No supraclavicular, axillary or pectoral adenopathy.      Left upper body: No supraclavicular, axillary or pectoral adenopathy.   Skin:     General: Skin is warm and dry.   Neurological:      General: No focal deficit present.      Mental Status: She is alert and oriented to person, place, and time.   Psychiatric:         Mood and Affect: Mood normal.         Behavior: Behavior normal.         Labs: I have reviewed the following labs:  Results for orders placed or performed in visit on 01/15/25   Vitamin B12   Result Value Ref Range    Vitamin B-12 624 232 - 1,245 pg/mL     Lab Results   Component Value Date/Time    White Blood Cell Count 9.0 02/15/2025 08:45 AM    Red Blood Cell Count 3.42 (L) 02/15/2025 08:45 AM    Hemoglobin 11.8 02/15/2025 08:45 AM    HCT 32.5 (L) 02/15/2025 08:45 AM    MCV 95 02/15/2025 08:45 AM    MCH 34.5 (H) 02/15/2025 08:45 AM    RDW 11.7 02/15/2025 08:45 AM    Platelet Count 142 (L) 02/15/2025 08:45 AM    Neutrophils 64 02/15/2025 08:45 AM    Lymphocytes 26 02/15/2025 08:45 AM    Monocytes 8 02/15/2025 08:45 AM    Eosinophils 1 02/15/2025 08:45 AM    Neutrophils (Absolute) 5.8 02/15/2025 08:45 AM      Lab Results   Component Value Date/Time    Sodium 143 02/15/2025 08:45 AM    Potassium 4.4 02/15/2025 08:45 AM    Chloride 107 (H) 02/15/2025 08:45 AM    CO2 25 02/15/2025 08:45 AM    BUN 24 02/15/2025 08:45 AM    Creatinine 0.94 02/15/2025 08:45 AM    Glucose, Random 81 02/15/2025 08:45 AM    AST 19 02/15/2025 08:45 AM    ALT 40 (H) 02/15/2025 08:45 AM    Protein, Total 5.9 (L) 02/15/2025 08:45 AM    Albumin 3.9 02/15/2025 08:45 AM    TOTAL BILIRUBIN 0.4 02/15/2025 08:45 AM    eGFR 67 02/15/2025 08:45 AM      Lab Results   Component Value Date/Time    IgG 827 02/15/2025 08:45 AM    IgA 106 02/15/2025 08:45 AM     02/15/2025 08:45 AM      Lab Results    Component Value Date/Time    Vitamin B-12 624 01/17/2025 09:10 AM    FOLATE, SERUM 7.2 01/03/2025 08:59 AM            Administrative Statements   I have spent a total time of 25 minutes in caring for this patient on the day of the visit/encounter including Diagnostic results, Instructions for management, Documenting in the medical record, Reviewing/placing orders in the medical record (including tests, medications, and/or procedures), and Obtaining or reviewing history  .

## 2025-02-19 DIAGNOSIS — K75.4 AUTOIMMUNE HEPATITIS (HCC): ICD-10-CM

## 2025-02-19 RX ORDER — MYCOPHENOLATE MOFETIL 500 MG/1
500 TABLET ORAL EVERY 12 HOURS
Qty: 180 TABLET | Refills: 3 | Status: SHIPPED | OUTPATIENT
Start: 2025-02-19

## 2025-02-20 ENCOUNTER — OFFICE VISIT (OUTPATIENT)
Age: 70
End: 2025-02-20
Payer: COMMERCIAL

## 2025-02-20 VITALS
BODY MASS INDEX: 31.82 KG/M2 | RESPIRATION RATE: 16 BRPM | WEIGHT: 191 LBS | HEIGHT: 65 IN | TEMPERATURE: 98 F | SYSTOLIC BLOOD PRESSURE: 132 MMHG | HEART RATE: 76 BPM | OXYGEN SATURATION: 95 % | DIASTOLIC BLOOD PRESSURE: 80 MMHG

## 2025-02-20 DIAGNOSIS — D69.6 THROMBOCYTOPENIA (HCC): Primary | ICD-10-CM

## 2025-02-20 DIAGNOSIS — D70.9 NEUTROPENIA, UNSPECIFIED TYPE (HCC): ICD-10-CM

## 2025-02-20 PROCEDURE — 99213 OFFICE O/P EST LOW 20 MIN: CPT | Performed by: NURSE PRACTITIONER

## 2025-02-21 ENCOUNTER — TELEPHONE (OUTPATIENT)
Age: 70
End: 2025-02-21

## 2025-03-10 DIAGNOSIS — K75.4 AUTOIMMUNE HEPATITIS (HCC): ICD-10-CM

## 2025-03-10 RX ORDER — PREDNISONE 5 MG/1
15 TABLET ORAL DAILY
Qty: 90 TABLET | Refills: 2 | Status: SHIPPED | OUTPATIENT
Start: 2025-03-10

## 2025-03-15 LAB
ALBUMIN SERPL-MCNC: 4 G/DL (ref 3.9–4.9)
ALP SERPL-CCNC: 107 IU/L (ref 44–121)
ALT SERPL-CCNC: 50 IU/L (ref 0–32)
AST SERPL-CCNC: 28 IU/L (ref 0–40)
BASOPHILS # BLD AUTO: 0 X10E3/UL (ref 0–0.2)
BASOPHILS NFR BLD AUTO: 1 %
BILIRUB SERPL-MCNC: 0.3 MG/DL (ref 0–1.2)
BUN SERPL-MCNC: 26 MG/DL (ref 8–27)
BUN/CREAT SERPL: 27 (ref 12–28)
CALCIUM SERPL-MCNC: 9.1 MG/DL (ref 8.7–10.3)
CHLORIDE SERPL-SCNC: 106 MMOL/L (ref 96–106)
CO2 SERPL-SCNC: 25 MMOL/L (ref 20–29)
CREAT SERPL-MCNC: 0.95 MG/DL (ref 0.57–1)
EGFR: 66 ML/MIN/1.73
EOSINOPHIL # BLD AUTO: 0.2 X10E3/UL (ref 0–0.4)
EOSINOPHIL NFR BLD AUTO: 2 %
ERYTHROCYTE [DISTWIDTH] IN BLOOD BY AUTOMATED COUNT: 11.7 % (ref 11.7–15.4)
GLOBULIN SER-MCNC: 2.3 G/DL (ref 1.5–4.5)
GLUCOSE SERPL-MCNC: 84 MG/DL (ref 70–99)
HCT VFR BLD AUTO: 35.6 % (ref 34–46.6)
HGB BLD-MCNC: 12.5 G/DL (ref 11.1–15.9)
IGA SERPL-MCNC: 126 MG/DL (ref 87–352)
IGG SERPL-MCNC: 830 MG/DL (ref 586–1602)
IGM SERPL-MCNC: 194 MG/DL (ref 26–217)
IMM GRANULOCYTES # BLD: 0.1 X10E3/UL (ref 0–0.1)
IMM GRANULOCYTES NFR BLD: 1 %
INR PPP: 0.9 (ref 0.9–1.2)
LYMPHOCYTES # BLD AUTO: 2.5 X10E3/UL (ref 0.7–3.1)
LYMPHOCYTES NFR BLD AUTO: 32 %
MCH RBC QN AUTO: 32.8 PG (ref 26.6–33)
MCHC RBC AUTO-ENTMCNC: 35.1 G/DL (ref 31.5–35.7)
MCV RBC AUTO: 93 FL (ref 79–97)
MONOCYTES # BLD AUTO: 0.6 X10E3/UL (ref 0.1–0.9)
MONOCYTES NFR BLD AUTO: 8 %
NEUTROPHILS # BLD AUTO: 4.4 X10E3/UL (ref 1.4–7)
NEUTROPHILS NFR BLD AUTO: 56 %
PLATELET # BLD AUTO: 190 X10E3/UL (ref 150–450)
POTASSIUM SERPL-SCNC: 3.8 MMOL/L (ref 3.5–5.2)
PROT SERPL-MCNC: 6.3 G/DL (ref 6–8.5)
PROTHROMBIN TIME: 10.1 SEC (ref 9.1–12)
RBC # BLD AUTO: 3.81 X10E6/UL (ref 3.77–5.28)
SODIUM SERPL-SCNC: 143 MMOL/L (ref 134–144)
WBC # BLD AUTO: 7.8 X10E3/UL (ref 3.4–10.8)

## 2025-04-14 ENCOUNTER — RESULTS FOLLOW-UP (OUTPATIENT)
Age: 70
End: 2025-04-14

## 2025-05-29 ENCOUNTER — TELEPHONE (OUTPATIENT)
Dept: OBGYN CLINIC | Facility: CLINIC | Age: 70
End: 2025-05-29

## 2025-05-29 ENCOUNTER — OFFICE VISIT (OUTPATIENT)
Dept: OBGYN CLINIC | Facility: CLINIC | Age: 70
End: 2025-05-29
Payer: COMMERCIAL

## 2025-05-29 VITALS — WEIGHT: 195 LBS | BODY MASS INDEX: 32.49 KG/M2 | HEIGHT: 65 IN

## 2025-05-29 DIAGNOSIS — M67.40 MUCOID CYST OF JOINT: Primary | ICD-10-CM

## 2025-05-29 PROCEDURE — 99213 OFFICE O/P EST LOW 20 MIN: CPT | Performed by: ORTHOPAEDIC SURGERY

## 2025-05-29 RX ORDER — LEVOTHYROXINE SODIUM 125 MCG
125 TABLET ORAL DAILY PRN
COMMUNITY
Start: 2025-03-04

## 2025-05-29 NOTE — TELEPHONE ENCOUNTER
LVM with 06/26/25 Dr Pryor follow up appointment information for patient.  If patient needs to reschedule time/date or week, please assist with reschedule.  Patient appointment needs to be 4-6 weeks from today.

## 2025-05-29 NOTE — PATIENT INSTRUCTIONS
Orthopedic Hand Surgery Sterile Saline Soak Directions  Perform hand soak three times daily for 10-15 minutes    Fill a container with enough warm water to cover your wound  Add one pump of antibacterial soap that you use to wash your hands  While soaking, you should exercise and move your fingers if your therapist has instructed you to do so  Throw away solution after soaking your hand, and make up fresh solution for each new soak  Pat wound/incision dry and re-dress if instructed by physician to keep the area covered

## 2025-05-29 NOTE — PROGRESS NOTES
ORTHOPAEDIC HAND, WRIST, AND ELBOW OFFICE  VISIT     Name: Estella Stein      : 1955      MRN: [id number]  Encounter Provider: Ladonna Pryor MD  Encounter Date: 2025   Encounter department: [facility]  :  Assessment & Plan  Mucoid cyst of joint  Subjective history, clinical exam, and diagnostic studies reviewed  Diagnosis discussed- right long finger mucoid cyst   Treatment options discussed which include nonoperative and operative management.  We discussed use of warm soapy soaks and compression.  Surgery can be considered following unsuccessful treatment with nonoperative management.  The patient was given the opportunity to ask questions.  Questions were answered to the patient's satisfaction.  The patient decided to move forward with warm soapy soaks and using compression via shared decision making.  The patient was instructed to perform warm soapy soaks 3 x's today for 15 minutes.  She was provided with a Silipos sleeve she was advised to use for compression.   Follow up in 4-6 weeks for repeat evaluation. If she id doing well, she may cancel.                    History of Present Illness   HPI  Chief Complaint   Patient presents with    Right Middle Finger - New Patient Visit     Possible cyst, tender to the touch       Estella Stein is a 70 y.o. female who presents to the office today for evaluation of right long finger cyst. The patient notes a mass to the DIP joint. She states this has been ongoing for a while. She states it would get larger and she would delroy it. She states it recently turned black. She states she may have bumped it at some point. The patient has been dealing with autoimmune issues and is treating with a rheumatologist.       REVIEW OF SYSTEMS:  General: no fever, no chills  HEENT:  No loss of hearing or eyesight problems  Eyes:  No red eyes  Respiratory:  No coughing, shortness of breath or  "wheezing  Cardiovascular:  No chest pain, no palpitations  GI:  Abdomen soft nontender, denies nausea  Endocrine:  No muscle weakness, no frequent urination, no excessive thirst  Urinary:  No dysuria, no incontinence  Musculoskeletal: see HPI and PE  SKIN:  No skin rash, no dry skin  Neurological:  No headaches, no confusion  Psychiatric:  No suicide thoughts, no anxiety, no depression  Review of all other systems is negative    Objective   Ht 5' 5\" (1.651 m)   Wt 88.5 kg (195 lb)   BMI 32.45 kg/m²      General: well developed and well nourished, alert, oriented times 3, and appears comfortable  Psychiatric: Normal  HEENT: Trachea Midline, No torticollis  Cardiovascular: No discernable arrhythmia  Pulmonary: No wheezing or stridor  Abdomen: No rebound or guarding  Extremities: No peripheral edema  Skin: No Lacerations, No Fluctuation, No Ulcerations  Neurovascular: Sensation Intact to the Median, Ulnar, Radial Nerve, Motor Intact to the Median, Ulnar, Radial Nerve, and Pulses Intact    Musculoskeletal exam:  Right long finger  Mucoid cyst at the DIP joint. No drainage. +eschar. Mild erythema but no signs of infection. Full digit extension. NTTP.      STUDIES REVIEWED:  No Studies to review      PROCEDURES PERFORMED:  Procedures  No Procedures performed today       "

## 2025-06-02 DIAGNOSIS — K75.4 AUTOIMMUNE HEPATITIS (HCC): ICD-10-CM

## 2025-06-02 RX ORDER — PREDNISONE 5 MG/1
15 TABLET ORAL DAILY
Qty: 90 TABLET | Refills: 2 | OUTPATIENT
Start: 2025-06-02

## 2025-06-05 ENCOUNTER — TELEPHONE (OUTPATIENT)
Age: 70
End: 2025-06-05

## 2025-06-05 NOTE — TELEPHONE ENCOUNTER
Caller: Patient    Doctor: Dr. Pryor    Reason for call: Patient called rescheduled follow up appointment 6/24, stated middle finger is very red, tender and having throbbing pain. Patient has questions if she should be seen sooner or if this is normal. Please advise.    Call back#: [phone]

## 2025-06-06 ENCOUNTER — NURSE TRIAGE (OUTPATIENT)
Dept: OTHER | Facility: OTHER | Age: 70
End: 2025-06-06

## 2025-06-06 NOTE — TELEPHONE ENCOUNTER
"REASON FOR CONVERSATION: Finger Pain    SYMPTOMS: right middle finger redness, swelling, increased pain when wearing sleeve and clear drainage.     OTHER HEALTH INFORMATION: Patient was seen in the office on 5/29/25 and has follow up appointment on 6/24/25.     PROTOCOL DISPOSITION:  Now (overriding See HPC Within 4 Hours (Or PCP Triage))    CARE ADVICE PROVIDED: On call provider stated to continue warm soaks, but discontinue sleeve. If redness or swelling worsens patient should call back and can consider antibiotics. Patient notified and verbalized understanding.     PRACTICE FOLLOW-UP: none      Reason for Disposition   [1] Looks infected (spreading redness, red streak, pus) AND [2] large red area (> 2 inches or 5 cm, or entire finger)    Answer Assessment - Initial Assessment Questions  1. ONSET: \"When did the pain start?\"      Since last appointment    2. LOCATION and RADIATION: \"Where is the pain located?\"  (e.g., fingertip, around nail, joint, entire       Right middle finger    3. SEVERITY: \"How bad is the pain?\" \"What does it keep you from doing?\"   (Scale 1-10; or mild, moderate, severe)      0/10    4. APPEARANCE: \"What does the finger look like?\" (e.g., redness, swelling, bruising, pallor)      Looks slightly swollen and red    5. WORK OR EXERCISE: \"Has there been any recent work or exercise that involved this part (i.e., fingers or hand) of the body?\"      Denies    6. CAUSE: \"What do you think is causing the pain?\"      Mucoid cyst of joint    7. AGGRAVATING FACTORS: \"What makes the pain worse?\" (e.g., using computer)      Wearing silopos sleeve    8. OTHER SYMPTOMS: \"Do you have any other symptoms?\" (e.g., fever, neck pain, numbness)  Clear drainage from cyst on finger    Protocols used: Finger Pain-Adult-AH    "

## 2025-06-06 NOTE — TELEPHONE ENCOUNTER
"Regarding: finger cyst/redness/pain/discharge  ----- Message from Manisha LANDAVERDE sent at 6/6/2025  6:25 PM EDT -----  Pt stated, \"I have a cyst on my middle finger. There is some discharge coming out of it. It is slightly swollen, red and painful. At times it wakes me up at night. The office called me regarding this issue today but I missed their call.\"    "

## 2025-06-09 NOTE — TELEPHONE ENCOUNTER
Called patient to follow-up, no answer, left voicemail.  Looks like the on-call provider this past weekend was notified of her message and reached out.  No further action right now thank you.

## 2025-06-23 LAB
ALBUMIN SERPL-MCNC: 4.1 G/DL (ref 3.9–4.9)
ALP SERPL-CCNC: 108 IU/L (ref 44–121)
ALT SERPL-CCNC: 41 IU/L (ref 0–32)
AST SERPL-CCNC: 31 IU/L (ref 0–40)
BASOPHILS # BLD AUTO: 0 X10E3/UL (ref 0–0.2)
BASOPHILS NFR BLD AUTO: 1 %
BILIRUB SERPL-MCNC: 0.5 MG/DL (ref 0–1.2)
BUN SERPL-MCNC: 23 MG/DL (ref 8–27)
BUN/CREAT SERPL: 23 (ref 12–28)
CALCIUM SERPL-MCNC: 9.2 MG/DL (ref 8.7–10.3)
CHLORIDE SERPL-SCNC: 105 MMOL/L (ref 96–106)
CO2 SERPL-SCNC: 22 MMOL/L (ref 20–29)
CREAT SERPL-MCNC: 0.99 MG/DL (ref 0.57–1)
EGFR: 62 ML/MIN/1.73
EOSINOPHIL # BLD AUTO: 0.2 X10E3/UL (ref 0–0.4)
EOSINOPHIL NFR BLD AUTO: 5 %
ERYTHROCYTE [DISTWIDTH] IN BLOOD BY AUTOMATED COUNT: 12.8 % (ref 11.7–15.4)
GLOBULIN SER-MCNC: 1.8 G/DL (ref 1.5–4.5)
GLUCOSE SERPL-MCNC: 92 MG/DL (ref 70–99)
HCT VFR BLD AUTO: 35 % (ref 34–46.6)
HGB BLD-MCNC: 11.9 G/DL (ref 11.1–15.9)
IMM GRANULOCYTES # BLD: 0 X10E3/UL (ref 0–0.1)
IMM GRANULOCYTES NFR BLD: 0 %
LYMPHOCYTES # BLD AUTO: 1.5 X10E3/UL (ref 0.7–3.1)
LYMPHOCYTES NFR BLD AUTO: 31 %
MCH RBC QN AUTO: 31.6 PG (ref 26.6–33)
MCHC RBC AUTO-ENTMCNC: 34 G/DL (ref 31.5–35.7)
MCV RBC AUTO: 93 FL (ref 79–97)
MONOCYTES # BLD AUTO: 0.5 X10E3/UL (ref 0.1–0.9)
MONOCYTES NFR BLD AUTO: 11 %
NEUTROPHILS # BLD AUTO: 2.5 X10E3/UL (ref 1.4–7)
NEUTROPHILS NFR BLD AUTO: 52 %
PLATELET # BLD AUTO: 175 X10E3/UL (ref 150–450)
POTASSIUM SERPL-SCNC: 4 MMOL/L (ref 3.5–5.2)
PROT SERPL-MCNC: 5.9 G/DL (ref 6–8.5)
RBC # BLD AUTO: 3.76 X10E6/UL (ref 3.77–5.28)
SODIUM SERPL-SCNC: 140 MMOL/L (ref 134–144)
WBC # BLD AUTO: 4.8 X10E3/UL (ref 3.4–10.8)

## 2025-06-24 ENCOUNTER — OFFICE VISIT (OUTPATIENT)
Dept: OBGYN CLINIC | Facility: CLINIC | Age: 70
End: 2025-06-24
Payer: COMMERCIAL

## 2025-06-24 VITALS — BODY MASS INDEX: 31.82 KG/M2 | WEIGHT: 191 LBS | HEIGHT: 65 IN

## 2025-06-24 DIAGNOSIS — M67.40 MUCOID CYST OF JOINT: ICD-10-CM

## 2025-06-24 DIAGNOSIS — S66.819A STRAIN OF FLEXOR TENDON OF WRIST: Primary | ICD-10-CM

## 2025-06-24 PROCEDURE — 99213 OFFICE O/P EST LOW 20 MIN: CPT | Performed by: ORTHOPAEDIC SURGERY

## 2025-06-24 NOTE — PROGRESS NOTES
ORTHOPAEDIC HAND, WRIST, AND ELBOW OFFICE  VISIT     Name: Estella Stein      : 1955      MRN: [id number]  Encounter Provider: Ladonna Pryor MD  Encounter Date: 2025   Encounter department: [facility]  :  Assessment & Plan  Strain of flexor tendon of left wrist  Subjective history, clinical exam reviewed patient  Diagnoses discussed, left wrist flexor carpi radialis strain.  Treatment options were discussed which include bracing, gentle stretching, activity modification, voltaren gel use.   The patient was given the opportunity to ask questions. Questions were answered to the patient's satisfaction.   Patient was informed she can continue to wear her brace and to perform gentle range of motion stretching occasionally throughout the day to not  the wrist become stiff.   She can utilize Voltaren gel over the affected area 3 times a day, until symptoms resolve.  I informed patient to limit the amount of heavy lifting she is doing throughout the day.   She will follow-up on as-needed basis if her condition worsens or does not improve.       Mucoid cyst of joint  Mucoid cyst of right long finger has resolved, no signs of erythema, drainage, infection or warmth.  She can continue to wear the Silipos sleeve for compression as needed.               History of Present Illness   HPI  Chief Complaint   Patient presents with    Right Middle Finger - Follow-up       Estella Stein is a 70 y.o. female who presents for follow-up evaluation of her right long finger mucoid cyst.  Patient states she is compliant with wearing the compression and feels that her symptoms have gotten better since last visit.      She has a new complaint of left wrist pain.  She states she pushed her dog off her bed last night which resulted in severe pain of her wrist, she did not feel a pop.  She denies any numbness and tingling into the hand.  She has been wearing the brace  "injection on the last couple hours for relief.          REVIEW OF SYSTEMS:  General: no fever, no chills  HEENT:  No loss of hearing or eyesight problems  Eyes:  No red eyes  Respiratory:  No coughing, shortness of breath or wheezing  Cardiovascular:  No chest pain, no palpitations  GI:  Abdomen soft nontender, denies nausea  Endocrine:  No muscle weakness, no frequent urination, no excessive thirst  Urinary:  No dysuria, no incontinence  Musculoskeletal: see HPI and PE  SKIN:  No skin rash, no dry skin  Neurological:  No headaches, no confusion  Psychiatric:  No suicide thoughts, no anxiety, no depression  Review of all other systems is negative    Objective   Ht 5' 5\" (1.651 m)   Wt 86.6 kg (191 lb)   BMI 31.78 kg/m²      General: well developed and well nourished, alert, oriented times 3, and appears comfortable  Psychiatric: Normal  HEENT: Trachea Midline, No torticollis  Cardiovascular: No discernable arrhythmia  Pulmonary: No wheezing or stridor  Abdomen: No rebound or guarding  Extremities: No peripheral edema  Skin: No masses, erythema, lacerations, fluctation, ulcerations  Neurovascular: Sensation Intact to the Median, Ulnar, Radial Nerve, Motor Intact to the Median, Ulnar, Radial Nerve, and Pulses Intact    Musculoskeletal exam:  Left upper extremity:   Full Elbow ROM and wrist ROM active with no pain   Full composite fist.  Full finger opposition  TTP along distal portion of FCR  No TTP of CMC  (-) Tinels       Right Upper Extremity:  Right Long Finger mucoid cyst has resolved  NO signs of infection, drainage, erythema, warmth   No TTP         STUDIES REVIEWED:  No Studies to review      PROCEDURES PERFORMED:  Procedures  No Procedures performed today      "

## 2025-06-26 LAB
METHYLMALONATE SERPL-SCNC: 175 NMOL/L (ref 0–378)
VIT B12 SERPL-MCNC: 1658 PG/ML (ref 232–1245)

## 2025-07-09 ENCOUNTER — OFFICE VISIT (OUTPATIENT)
Dept: LAB | Facility: HOSPITAL | Age: 70
End: 2025-07-09
Payer: COMMERCIAL

## 2025-07-09 DIAGNOSIS — Z01.818 ENCOUNTER FOR PREADMISSION TESTING: ICD-10-CM

## 2025-07-09 LAB
ATRIAL RATE: 77 BPM
P AXIS: 51 DEGREES
PR INTERVAL: 154 MS
QRS AXIS: 23 DEGREES
QRSD INTERVAL: 82 MS
QT INTERVAL: 372 MS
QTC INTERVAL: 421 MS
T WAVE AXIS: 23 DEGREES
VENTRICULAR RATE: 77 BPM

## 2025-07-09 PROCEDURE — 93005 ELECTROCARDIOGRAM TRACING: CPT

## 2025-07-09 PROCEDURE — 93010 ELECTROCARDIOGRAM REPORT: CPT | Performed by: INTERNAL MEDICINE

## 2025-07-15 ENCOUNTER — TELEPHONE (OUTPATIENT)
Age: 70
End: 2025-07-15

## 2025-07-16 NOTE — TELEPHONE ENCOUNTER
Called and LVM for patient regarding appt on 7/30 with Letitia Angulo. Did confirm with provider that provider does want to see patient on 7/30. In VM told patient that appt can be done as a virtual or in person. If patient decides to stay as a virtual appointment, patient must be in the state [place] for appt.

## 2025-08-04 DIAGNOSIS — R91.1 SOLITARY PULMONARY NODULE: ICD-10-CM
